# Patient Record
Sex: FEMALE | Race: WHITE | NOT HISPANIC OR LATINO
[De-identification: names, ages, dates, MRNs, and addresses within clinical notes are randomized per-mention and may not be internally consistent; named-entity substitution may affect disease eponyms.]

---

## 2017-02-16 ENCOUNTER — APPOINTMENT (OUTPATIENT)
Dept: PAIN MANAGEMENT | Facility: CLINIC | Age: 82
End: 2017-02-16

## 2017-02-16 VITALS
DIASTOLIC BLOOD PRESSURE: 94 MMHG | WEIGHT: 121 LBS | HEIGHT: 64 IN | BODY MASS INDEX: 20.66 KG/M2 | SYSTOLIC BLOOD PRESSURE: 159 MMHG | HEART RATE: 71 BPM

## 2017-03-31 ENCOUNTER — APPOINTMENT (OUTPATIENT)
Dept: PAIN MANAGEMENT | Facility: CLINIC | Age: 82
End: 2017-03-31

## 2017-03-31 VITALS
SYSTOLIC BLOOD PRESSURE: 142 MMHG | BODY MASS INDEX: 20.66 KG/M2 | DIASTOLIC BLOOD PRESSURE: 60 MMHG | HEIGHT: 64 IN | HEART RATE: 76 BPM | WEIGHT: 121 LBS

## 2017-03-31 DIAGNOSIS — G43.709 CHRONIC MIGRAINE W/OUT AURA, NOT INTRACTABLE, W/OUT STATUS MIGRAINOSUS: ICD-10-CM

## 2017-06-13 ENCOUNTER — OUTPATIENT (OUTPATIENT)
Dept: OUTPATIENT SERVICES | Facility: HOSPITAL | Age: 82
LOS: 1 days | End: 2017-06-13
Payer: MEDICARE

## 2017-06-13 VITALS
RESPIRATION RATE: 17 BRPM | HEIGHT: 64 IN | OXYGEN SATURATION: 100 % | HEART RATE: 68 BPM | DIASTOLIC BLOOD PRESSURE: 79 MMHG | WEIGHT: 119.05 LBS | SYSTOLIC BLOOD PRESSURE: 136 MMHG | TEMPERATURE: 98 F

## 2017-06-13 DIAGNOSIS — Z98.89 OTHER SPECIFIED POSTPROCEDURAL STATES: Chronic | ICD-10-CM

## 2017-06-13 DIAGNOSIS — Z01.810 ENCOUNTER FOR PREPROCEDURAL CARDIOVASCULAR EXAMINATION: ICD-10-CM

## 2017-06-13 LAB
ALBUMIN SERPL ELPH-MCNC: 4.3 G/DL — SIGNIFICANT CHANGE UP (ref 3.3–5)
ALP SERPL-CCNC: 64 U/L — SIGNIFICANT CHANGE UP (ref 40–120)
ALT FLD-CCNC: 17 U/L RC — SIGNIFICANT CHANGE UP (ref 10–45)
ANION GAP SERPL CALC-SCNC: 16 MMOL/L — SIGNIFICANT CHANGE UP (ref 5–17)
AST SERPL-CCNC: 18 U/L — SIGNIFICANT CHANGE UP (ref 10–40)
BILIRUB SERPL-MCNC: 0.6 MG/DL — SIGNIFICANT CHANGE UP (ref 0.2–1.2)
BUN SERPL-MCNC: 43 MG/DL — HIGH (ref 7–23)
CALCIUM SERPL-MCNC: 9.5 MG/DL — SIGNIFICANT CHANGE UP (ref 8.4–10.5)
CHLORIDE SERPL-SCNC: 106 MMOL/L — SIGNIFICANT CHANGE UP (ref 96–108)
CO2 SERPL-SCNC: 23 MMOL/L — SIGNIFICANT CHANGE UP (ref 22–31)
CREAT SERPL-MCNC: 1.79 MG/DL — HIGH (ref 0.5–1.3)
GLUCOSE SERPL-MCNC: 80 MG/DL — SIGNIFICANT CHANGE UP (ref 70–99)
HCT VFR BLD CALC: 37.7 % — SIGNIFICANT CHANGE UP (ref 34.5–45)
HGB BLD-MCNC: 12.6 G/DL — SIGNIFICANT CHANGE UP (ref 11.5–15.5)
MCHC RBC-ENTMCNC: 32.7 PG — SIGNIFICANT CHANGE UP (ref 27–34)
MCHC RBC-ENTMCNC: 33.4 GM/DL — SIGNIFICANT CHANGE UP (ref 32–36)
MCV RBC AUTO: 97.9 FL — SIGNIFICANT CHANGE UP (ref 80–100)
PLATELET # BLD AUTO: 153 K/UL — SIGNIFICANT CHANGE UP (ref 150–400)
POTASSIUM SERPL-MCNC: 4.5 MMOL/L — SIGNIFICANT CHANGE UP (ref 3.5–5.3)
POTASSIUM SERPL-SCNC: 4.5 MMOL/L — SIGNIFICANT CHANGE UP (ref 3.5–5.3)
PROT SERPL-MCNC: 7.7 G/DL — SIGNIFICANT CHANGE UP (ref 6–8.3)
RBC # BLD: 3.85 M/UL — SIGNIFICANT CHANGE UP (ref 3.8–5.2)
RBC # FLD: 12.6 % — SIGNIFICANT CHANGE UP (ref 10.3–14.5)
SODIUM SERPL-SCNC: 145 MMOL/L — SIGNIFICANT CHANGE UP (ref 135–145)
WBC # BLD: 5.7 K/UL — SIGNIFICANT CHANGE UP (ref 3.8–10.5)
WBC # FLD AUTO: 5.7 K/UL — SIGNIFICANT CHANGE UP (ref 3.8–10.5)

## 2017-06-13 PROCEDURE — C1769: CPT

## 2017-06-13 PROCEDURE — 93458 L HRT ARTERY/VENTRICLE ANGIO: CPT

## 2017-06-13 PROCEDURE — 93010 ELECTROCARDIOGRAM REPORT: CPT

## 2017-06-13 PROCEDURE — C1894: CPT

## 2017-06-13 PROCEDURE — 85027 COMPLETE CBC AUTOMATED: CPT

## 2017-06-13 PROCEDURE — 93458 L HRT ARTERY/VENTRICLE ANGIO: CPT | Mod: 26

## 2017-06-13 PROCEDURE — 93005 ELECTROCARDIOGRAM TRACING: CPT

## 2017-06-13 PROCEDURE — 93567 NJX CAR CTH SPRVLV AORTGRPHY: CPT

## 2017-06-13 PROCEDURE — C1887: CPT

## 2017-06-13 PROCEDURE — 80053 COMPREHEN METABOLIC PANEL: CPT

## 2017-06-13 RX ORDER — SODIUM BICARBONATE 1 MEQ/ML
0.44 SYRINGE (ML) INTRAVENOUS
Qty: 150 | Refills: 0 | Status: DISCONTINUED | OUTPATIENT
Start: 2017-06-13 | End: 2017-06-28

## 2017-06-13 RX ADMIN — Medication 160 MEQ/KG/HR: at 11:45

## 2017-06-13 NOTE — H&P CARDIOLOGY - PMH
Bronchiolectasis    Elevated serum creatinine  baseline  renal Md Hilepo  Migraine    Mitral valve prolapse    Palpitations

## 2017-06-13 NOTE — H&P CARDIOLOGY - HISTORY OF PRESENT ILLNESS
This is a 83 year old female with PMH of broncholithiasis, mitral valve prolapse, migraine and palpitations ( for which she tales Metroprolol),  baseline elevated Creatinine ( latest creat 1.67 managed by Md Everett, received Mucomyst 600mg x2 before cath, and to be hydrated with D5W with 150 meq of HCO3- at 160c/hr for 1 hour prior to cath then at 54cc/hr for 6 hours post cath). who presents with hemoptysis  for 1 day. Reports of chronic cough.  Yesterday morning, pt noticed bright red blood in sputum after coughing.  Following that she had couple of episodes of hemoptysis.   Denies fever, chills, dizziness, headache, chest pain, palpitations, shortness of breath, abdominal pain, nausea, vomiting, diarrhea and LE edema.      In ED VS: /75  RR 20 Temp 97.8 O2 sat 99%  In ED pt received Ceftriaxone 1 gram IV and Azithromycin 500 mg IV  CXR: No acute disease  CT chest with contrast: New 2cm ground glass opacity in superior segment of right lower lobe which could be infectious, inflammatory or neoplastic.  Stable pulmonary nodules and multiple areas of mucoid impaction.  Bronchiolectasis  New 9mm opacity in right lower lobe likely represent a new foci of mucoid impaction.    Bun 48 Cr. 1.93 This is a 83 year old female with PMH of broncholithiasis, mitral valve prolapse, migraine and palpitations ( for which she tales Metroprolol),  baseline elevated Creatinine ( latest creat 1.67 managed by Md Everett, received Mucomyst 600mg x2 before cath, and to be hydrated with D5W with 150 meq of HCO3-/Iliter  at 160cc/hr for 1 hour prior to cath then at 54cc/hr for 6 hours post cath). who presents with hemoptysis  for 1 day. Reports of chronic cough.  Yesterday morning, pt noticed bright red blood in sputum after coughing.  Following that she had couple of episodes of hemoptysis.   Denies fever, chills, dizziness, headache, chest pain, palpitations, shortness of breath, abdominal pain, nausea, vomiting, diarrhea and LE edema.      In ED VS: /75  RR 20 Temp 97.8 O2 sat 99%  In ED pt received Ceftriaxone 1 gram IV and Azithromycin 500 mg IV  CXR: No acute disease  CT chest with contrast: New 2cm ground glass opacity in superior segment of right lower lobe which could be infectious, inflammatory or neoplastic.  Stable pulmonary nodules and multiple areas of mucoid impaction.  Bronchiolectasis  New 9mm opacity in right lower lobe likely represent a new foci of mucoid impaction.    Bun 48 Cr. 1.93 This is a 83 year old female with PMH of broncholithiasis, mitral valve prolapse, migraine and palpitations ( for which she takes Metroprolol),  baseline elevated Creatinine ( latest creat 1.67 managed by Md Everett, received Mucomyst 600mg x2 before cath, and to be hydrated with D5W with 150 meq of HCO3-/Iliter  at 160cc/hr for 1 hour prior to cath then at 54cc/hr for 6 hours post cath- as per Md YANG Perez).   Presents to Md Perez with cc of bl feet edema x2 months ago, fatigue and "winded" on exertion, after 2 blocks.  Patient states she used to be a runner for the past 30 years, and this is very unusual for her to be experiencing.  ECHO showed aortic insufficiency, as per patient ( no ECHO report available). Referred here today for Right and Left HC.  Currently denies  fever, chills, hemoptysis, dizziness, headache, chest pain, palpitations, shortness of breath, abdominal pain, nausea, vomiting, diarrhea and LE edema.

## 2017-06-13 NOTE — CHART NOTE - NSCHARTNOTEFT_GEN_A_CORE
Diagnostic Removal of Femoral Sheath (done by RN Shraddha Edmond)    At 16:10 called at bed side by RN, patient developed Right groin Hematoma after walking to the bathroom.  Patient is s/p diagnostic cath via RFA, came out of cath lab at 13:30 and sheaths were pulled around 13:40 , w/o any complications ( no bleeding and no hematoma) as per RN.  As per RN right groin has been stable for the past few hours up to now.    V/s stable /68 HR 58 RR 16/min O2 sat 97% on RA    The patient was placed in the supine position.  Assisted nurse with manual compression of hematoma, as per protocol.  Direct pressure was applied for  approx. 15 minutes with hematoma resolution.  Right groin now soft, mildly tender to touch ( as expected), and mild right groin ecchymosis present ( also as expected after hematoma compression).     Pulses in the (right) lower extremity are (palpable +2); Palpable +2 pulses of both right femoral and right pedal pulses .  Patient denies chest pain, leg or foot pain, leg or foot numbness/tingling   + capillary refill of right lower extremity     After hematoma compression and resolution, patient placed on bed rest for about 45 min, while closely monitoring site   Monitoring of the (right) groin and both lower extremities including neuro-vascular checks and vital signs every 15 minutes x 4, then every 30 minutes x 2, then every 1 hour was ordered.    Around 17:30 patient was able to ambulate down hallway, and able to go to bathroom and void w/o any complications  right groin remains stable ( no bleeding, no hematoma and no pain)   v/s cont to be stable: /60 HR 58 RR16/min O2 sat 96% RA    Patient okay to be DC home at this time  Patient verbalizes understanding of  activity do's and dont's, as well as all reportable signs and symptoms  teaching given to both patient and daughter ( at bed side)

## 2017-06-13 NOTE — H&P CARDIOLOGY - FAMILY HISTORY
Father  Still living? Unknown  FH: CAD (coronary artery disease), Age at diagnosis: Age Unknown  FH: MI (myocardial infarction), Age at diagnosis: Age Unknown

## 2017-06-15 PROBLEM — Z82.49 FAMILY HISTORY OF CAD (CORONARY ARTERY DISEASE): Status: ACTIVE | Noted: 2017-06-15

## 2017-06-15 PROBLEM — Z60.2 LIVES ALONE WITHOUT HELP AVAILABLE: Status: ACTIVE | Noted: 2017-06-15

## 2017-06-15 PROBLEM — R00.2 PALPITATIONS: Status: RESOLVED | Noted: 2017-06-15 | Resolved: 2017-06-15

## 2017-06-15 PROBLEM — Z63.4 WIDOWED: Status: ACTIVE | Noted: 2017-06-15

## 2017-06-15 PROBLEM — I34.1 MITRAL VALVE PROLAPSE SYNDROME: Status: ACTIVE | Noted: 2017-06-15

## 2017-06-15 PROBLEM — I35.1 AORTIC REGURGITATION: Status: ACTIVE | Noted: 2017-06-15

## 2017-06-15 RX ORDER — FUROSEMIDE 20 MG/1
20 TABLET ORAL
Qty: 10 | Refills: 0 | Status: DISCONTINUED | COMMUNITY
Start: 2016-12-19

## 2017-06-15 RX ORDER — TOBRAMYCIN AND DEXAMETHASONE 3; 1 MG/ML; MG/ML
0.3-0.1 SUSPENSION/ DROPS OPHTHALMIC
Qty: 5 | Refills: 0 | Status: DISCONTINUED | COMMUNITY
Start: 2016-11-29

## 2017-06-19 ENCOUNTER — APPOINTMENT (OUTPATIENT)
Dept: CARDIOTHORACIC SURGERY | Facility: CLINIC | Age: 82
End: 2017-06-19

## 2017-06-19 DIAGNOSIS — Z82.49 FAMILY HISTORY OF ISCHEMIC HEART DISEASE AND OTHER DISEASES OF THE CIRCULATORY SYSTEM: ICD-10-CM

## 2017-06-19 DIAGNOSIS — I34.1 NONRHEUMATIC MITRAL (VALVE) PROLAPSE: ICD-10-CM

## 2017-06-19 DIAGNOSIS — Z60.2 PROBLEMS RELATED TO LIVING ALONE: ICD-10-CM

## 2017-06-19 DIAGNOSIS — Z63.4 DISAPPEARANCE AND DEATH OF FAMILY MEMBER: ICD-10-CM

## 2017-06-19 DIAGNOSIS — R00.2 PALPITATIONS: ICD-10-CM

## 2017-06-19 DIAGNOSIS — I35.1 NONRHEUMATIC AORTIC (VALVE) INSUFFICIENCY: ICD-10-CM

## 2017-06-19 SDOH — SOCIAL STABILITY - SOCIAL INSECURITY: DISSAPEARANCE AND DEATH OF FAMILY MEMBER: Z63.4

## 2017-06-19 SDOH — SOCIAL STABILITY - SOCIAL INSECURITY: PROBLEMS RELATED TO LIVING ALONE: Z60.2

## 2017-06-20 ENCOUNTER — APPOINTMENT (OUTPATIENT)
Dept: PAIN MANAGEMENT | Facility: CLINIC | Age: 82
End: 2017-06-20

## 2017-06-26 ENCOUNTER — APPOINTMENT (OUTPATIENT)
Dept: CARDIOTHORACIC SURGERY | Facility: CLINIC | Age: 82
End: 2017-06-26

## 2017-06-26 ENCOUNTER — APPOINTMENT (OUTPATIENT)
Dept: CARDIOLOGY | Facility: CLINIC | Age: 82
End: 2017-06-26

## 2017-06-26 ENCOUNTER — OUTPATIENT (OUTPATIENT)
Dept: OUTPATIENT SERVICES | Facility: HOSPITAL | Age: 82
LOS: 1 days | End: 2017-06-26
Payer: MEDICARE

## 2017-06-26 VITALS — SYSTOLIC BLOOD PRESSURE: 129 MMHG | DIASTOLIC BLOOD PRESSURE: 80 MMHG

## 2017-06-26 VITALS
HEIGHT: 64 IN | SYSTOLIC BLOOD PRESSURE: 146 MMHG | WEIGHT: 118 LBS | BODY MASS INDEX: 20.14 KG/M2 | DIASTOLIC BLOOD PRESSURE: 73 MMHG | TEMPERATURE: 97.3 F | RESPIRATION RATE: 16 BRPM | OXYGEN SATURATION: 97 % | HEART RATE: 70 BPM

## 2017-06-26 VITALS
SYSTOLIC BLOOD PRESSURE: 149 MMHG | HEART RATE: 59 BPM | HEIGHT: 64 IN | BODY MASS INDEX: 20.14 KG/M2 | DIASTOLIC BLOOD PRESSURE: 74 MMHG | OXYGEN SATURATION: 100 % | WEIGHT: 118 LBS

## 2017-06-26 DIAGNOSIS — T14.8 OTHER INJURY OF UNSPECIFIED BODY REGION: ICD-10-CM

## 2017-06-26 DIAGNOSIS — Z98.89 OTHER SPECIFIED POSTPROCEDURAL STATES: Chronic | ICD-10-CM

## 2017-06-26 PROCEDURE — 93926 LOWER EXTREMITY STUDY: CPT

## 2017-06-26 PROCEDURE — 93926 LOWER EXTREMITY STUDY: CPT | Mod: 26,RT

## 2017-07-18 ENCOUNTER — APPOINTMENT (OUTPATIENT)
Dept: PAIN MANAGEMENT | Facility: CLINIC | Age: 82
End: 2017-07-18

## 2017-07-18 VITALS
BODY MASS INDEX: 20.14 KG/M2 | WEIGHT: 118 LBS | HEART RATE: 68 BPM | DIASTOLIC BLOOD PRESSURE: 75 MMHG | HEIGHT: 64 IN | SYSTOLIC BLOOD PRESSURE: 154 MMHG

## 2017-07-18 DIAGNOSIS — N94.9 UNSPECIFIED CONDITION ASSOCIATED WITH FEMALE GENITAL ORGANS AND MENSTRUAL CYCLE: ICD-10-CM

## 2017-08-08 ENCOUNTER — APPOINTMENT (OUTPATIENT)
Dept: CARDIOTHORACIC SURGERY | Facility: CLINIC | Age: 82
End: 2017-08-08
Payer: MEDICARE

## 2017-08-08 ENCOUNTER — APPOINTMENT (OUTPATIENT)
Dept: ULTRASOUND IMAGING | Facility: HOSPITAL | Age: 82
End: 2017-08-08

## 2017-08-08 ENCOUNTER — OUTPATIENT (OUTPATIENT)
Dept: OUTPATIENT SERVICES | Facility: HOSPITAL | Age: 82
LOS: 1 days | End: 2017-08-08
Payer: MEDICARE

## 2017-08-08 VITALS
HEIGHT: 63 IN | OXYGEN SATURATION: 99 % | DIASTOLIC BLOOD PRESSURE: 74 MMHG | TEMPERATURE: 97 F | SYSTOLIC BLOOD PRESSURE: 144 MMHG | WEIGHT: 119.49 LBS | HEART RATE: 72 BPM | RESPIRATION RATE: 18 BRPM

## 2017-08-08 DIAGNOSIS — Z01.818 ENCOUNTER FOR OTHER PREPROCEDURAL EXAMINATION: ICD-10-CM

## 2017-08-08 DIAGNOSIS — I35.0 NONRHEUMATIC AORTIC (VALVE) STENOSIS: ICD-10-CM

## 2017-08-08 DIAGNOSIS — R79.89 OTHER SPECIFIED ABNORMAL FINDINGS OF BLOOD CHEMISTRY: ICD-10-CM

## 2017-08-08 DIAGNOSIS — Z98.49 CATARACT EXTRACTION STATUS, UNSPECIFIED EYE: Chronic | ICD-10-CM

## 2017-08-08 DIAGNOSIS — Z98.89 OTHER SPECIFIED POSTPROCEDURAL STATES: Chronic | ICD-10-CM

## 2017-08-08 DIAGNOSIS — Z98.890 OTHER SPECIFIED POSTPROCEDURAL STATES: Chronic | ICD-10-CM

## 2017-08-08 LAB
ANION GAP SERPL CALC-SCNC: 15 MMOL/L — SIGNIFICANT CHANGE UP (ref 5–17)
BLD GP AB SCN SERPL QL: NEGATIVE — SIGNIFICANT CHANGE UP
BUN SERPL-MCNC: 48 MG/DL — HIGH (ref 7–23)
CALCIUM SERPL-MCNC: 9.8 MG/DL — SIGNIFICANT CHANGE UP (ref 8.4–10.5)
CHLORIDE SERPL-SCNC: 101 MMOL/L — SIGNIFICANT CHANGE UP (ref 96–108)
CO2 SERPL-SCNC: 23 MMOL/L — SIGNIFICANT CHANGE UP (ref 22–31)
CREAT SERPL-MCNC: 1.82 MG/DL — HIGH (ref 0.5–1.3)
GLUCOSE SERPL-MCNC: 145 MG/DL — HIGH (ref 70–99)
HBA1C BLD-MCNC: 5.6 % — SIGNIFICANT CHANGE UP (ref 4–5.6)
HCT VFR BLD CALC: 37.9 % — SIGNIFICANT CHANGE UP (ref 34.5–45)
HGB BLD-MCNC: 12.3 G/DL — SIGNIFICANT CHANGE UP (ref 11.5–15.5)
MCHC RBC-ENTMCNC: 31 PG — SIGNIFICANT CHANGE UP (ref 27–34)
MCHC RBC-ENTMCNC: 32.5 GM/DL — SIGNIFICANT CHANGE UP (ref 32–36)
MCV RBC AUTO: 95.5 FL — SIGNIFICANT CHANGE UP (ref 80–100)
PLATELET # BLD AUTO: 167 K/UL — SIGNIFICANT CHANGE UP (ref 150–400)
POTASSIUM SERPL-MCNC: 4.2 MMOL/L — SIGNIFICANT CHANGE UP (ref 3.5–5.3)
POTASSIUM SERPL-SCNC: 4.2 MMOL/L — SIGNIFICANT CHANGE UP (ref 3.5–5.3)
RBC # BLD: 3.97 M/UL — SIGNIFICANT CHANGE UP (ref 3.8–5.2)
RBC # FLD: 14 % — SIGNIFICANT CHANGE UP (ref 10.3–14.5)
RH IG SCN BLD-IMP: POSITIVE — SIGNIFICANT CHANGE UP
SODIUM SERPL-SCNC: 139 MMOL/L — SIGNIFICANT CHANGE UP (ref 135–145)
WBC # BLD: 5.35 K/UL — SIGNIFICANT CHANGE UP (ref 3.8–10.5)
WBC # FLD AUTO: 5.35 K/UL — SIGNIFICANT CHANGE UP (ref 3.8–10.5)

## 2017-08-08 PROCEDURE — 71046 X-RAY EXAM CHEST 2 VIEWS: CPT

## 2017-08-08 PROCEDURE — 83036 HEMOGLOBIN GLYCOSYLATED A1C: CPT

## 2017-08-08 PROCEDURE — 93880 EXTRACRANIAL BILAT STUDY: CPT | Mod: 26

## 2017-08-08 PROCEDURE — 85027 COMPLETE CBC AUTOMATED: CPT

## 2017-08-08 PROCEDURE — 93880 EXTRACRANIAL BILAT STUDY: CPT

## 2017-08-08 PROCEDURE — 71020: CPT | Mod: 26

## 2017-08-08 PROCEDURE — 86850 RBC ANTIBODY SCREEN: CPT

## 2017-08-08 PROCEDURE — 94010 BREATHING CAPACITY TEST: CPT

## 2017-08-08 PROCEDURE — 80048 BASIC METABOLIC PNL TOTAL CA: CPT

## 2017-08-08 PROCEDURE — G0463: CPT

## 2017-08-08 PROCEDURE — 87641 MR-STAPH DNA AMP PROBE: CPT

## 2017-08-08 PROCEDURE — 86900 BLOOD TYPING SEROLOGIC ABO: CPT

## 2017-08-08 PROCEDURE — 86901 BLOOD TYPING SEROLOGIC RH(D): CPT

## 2017-08-08 PROCEDURE — 87640 STAPH A DNA AMP PROBE: CPT

## 2017-08-08 RX ORDER — CEFUROXIME AXETIL 250 MG
1500 TABLET ORAL ONCE
Qty: 0 | Refills: 0 | Status: COMPLETED | OUTPATIENT
Start: 2017-08-15 | End: 2017-08-15

## 2017-08-08 NOTE — H&P PST ADULT - HISTORY OF PRESENT ILLNESS
83 year old female with h/o elevated creatinine x years, occasional palpitation, MVP, migraine headaches, and nonrheumatic aortic valve stenosis, scheduled for aortic valve repair.

## 2017-08-08 NOTE — H&P PST ADULT - NSANTHOSAYNRD_GEN_A_CORE
No. HERNESTO screening performed.  STOP BANG Legend: 0-2 = LOW Risk; 3-4 = INTERMEDIATE Risk; 5-8 = HIGH Risk

## 2017-08-08 NOTE — H&P PST ADULT - PMH
Bronchiolectasis    Elevated serum creatinine  baseline  renal Md Hilepo  Migraine    Mitral valve prolapse    Palpitations Bronchiolectasis    Elevated serum creatinine  baseline  renal Md Hilepo  Migraine    Mitral valve prolapse    Nonrheumatic aortic (valve) stenosis    Palpitations    Scoliosis unspecified, unspecified site

## 2017-08-09 LAB
MRSA PCR RESULT.: SIGNIFICANT CHANGE UP
S AUREUS DNA NOSE QL NAA+PROBE: SIGNIFICANT CHANGE UP

## 2017-08-15 ENCOUNTER — TRANSCRIPTION ENCOUNTER (OUTPATIENT)
Age: 82
End: 2017-08-15

## 2017-08-15 ENCOUNTER — APPOINTMENT (OUTPATIENT)
Dept: CARDIOTHORACIC SURGERY | Facility: HOSPITAL | Age: 82
End: 2017-08-15

## 2017-08-15 ENCOUNTER — INPATIENT (INPATIENT)
Facility: HOSPITAL | Age: 82
LOS: 9 days | Discharge: HOME | DRG: 219 | End: 2017-08-25
Attending: THORACIC SURGERY (CARDIOTHORACIC VASCULAR SURGERY) | Admitting: THORACIC SURGERY (CARDIOTHORACIC VASCULAR SURGERY)
Payer: MEDICARE

## 2017-08-15 ENCOUNTER — RESULT REVIEW (OUTPATIENT)
Age: 82
End: 2017-08-15

## 2017-08-15 VITALS
HEIGHT: 63 IN | RESPIRATION RATE: 18 BRPM | OXYGEN SATURATION: 97 % | HEART RATE: 100 BPM | TEMPERATURE: 98 F | WEIGHT: 120.37 LBS | SYSTOLIC BLOOD PRESSURE: 158 MMHG | DIASTOLIC BLOOD PRESSURE: 83 MMHG

## 2017-08-15 DIAGNOSIS — Z98.890 OTHER SPECIFIED POSTPROCEDURAL STATES: Chronic | ICD-10-CM

## 2017-08-15 DIAGNOSIS — I35.0 NONRHEUMATIC AORTIC (VALVE) STENOSIS: ICD-10-CM

## 2017-08-15 DIAGNOSIS — Z98.89 OTHER SPECIFIED POSTPROCEDURAL STATES: Chronic | ICD-10-CM

## 2017-08-15 DIAGNOSIS — Z98.49 CATARACT EXTRACTION STATUS, UNSPECIFIED EYE: Chronic | ICD-10-CM

## 2017-08-15 LAB
ALBUMIN SERPL ELPH-MCNC: 3.6 G/DL — SIGNIFICANT CHANGE UP (ref 3.3–5)
ALP SERPL-CCNC: 45 U/L — SIGNIFICANT CHANGE UP (ref 40–120)
ALT FLD-CCNC: 15 U/L RC — SIGNIFICANT CHANGE UP (ref 10–45)
ANION GAP SERPL CALC-SCNC: 15 MMOL/L — SIGNIFICANT CHANGE UP (ref 5–17)
APTT BLD: 34 SEC — SIGNIFICANT CHANGE UP (ref 27.5–37.4)
AST SERPL-CCNC: 38 U/L — SIGNIFICANT CHANGE UP (ref 10–40)
BASE EXCESS BLDV CALC-SCNC: -5 MMOL/L — LOW (ref -2–2)
BILIRUB SERPL-MCNC: 1.2 MG/DL — SIGNIFICANT CHANGE UP (ref 0.2–1.2)
BLOOD GAS VENOUS - CREATININE: SIGNIFICANT CHANGE UP MG/DL (ref 0.5–1.3)
BUN SERPL-MCNC: 35 MG/DL — HIGH (ref 7–23)
CA-I SERPL-SCNC: 1.03 MMOL/L — LOW (ref 1.12–1.3)
CALCIUM SERPL-MCNC: 8.4 MG/DL — SIGNIFICANT CHANGE UP (ref 8.4–10.5)
CHLORIDE BLDV-SCNC: SIGNIFICANT CHANGE UP MMOL/L (ref 96–108)
CHLORIDE SERPL-SCNC: 105 MMOL/L — SIGNIFICANT CHANGE UP (ref 96–108)
CK MB BLD-MCNC: 15.6 % — HIGH (ref 0–3.5)
CK MB CFR SERPL CALC: 52.3 NG/ML — HIGH (ref 0–3.8)
CK SERPL-CCNC: 335 U/L — HIGH (ref 25–170)
CO2 SERPL-SCNC: 24 MMOL/L — SIGNIFICANT CHANGE UP (ref 22–31)
CREAT SERPL-MCNC: 1.23 MG/DL — SIGNIFICANT CHANGE UP (ref 0.5–1.3)
EOSINOPHIL # BLD AUTO: 0.1 K/UL — SIGNIFICANT CHANGE UP (ref 0–0.5)
EOSINOPHIL NFR BLD AUTO: 3 % — SIGNIFICANT CHANGE UP (ref 0–6)
FIBRINOGEN PPP-MCNC: 239 MG/DL — LOW (ref 310–510)
GAS PNL BLDA: SIGNIFICANT CHANGE UP
GAS PNL BLDV: 139 MMOL/L — SIGNIFICANT CHANGE UP (ref 136–145)
GAS PNL BLDV: SIGNIFICANT CHANGE UP
GAS PNL BLDV: SIGNIFICANT CHANGE UP
GLUCOSE BLDV-MCNC: 136 MG/DL — HIGH (ref 70–99)
GLUCOSE SERPL-MCNC: 210 MG/DL — HIGH (ref 70–99)
HCO3 BLDV-SCNC: 22 MMOL/L — SIGNIFICANT CHANGE UP (ref 21–29)
HCT VFR BLD CALC: 37.7 % — SIGNIFICANT CHANGE UP (ref 34.5–45)
HCT VFR BLDA CALC: 27 % — LOW (ref 39–50)
HGB BLD CALC-MCNC: 8.7 G/DL — LOW (ref 11.5–15.5)
HGB BLD-MCNC: 12.9 G/DL — SIGNIFICANT CHANGE UP (ref 11.5–15.5)
HOROWITZ INDEX BLDV+IHG-RTO: 0 — SIGNIFICANT CHANGE UP
INR BLD: 1.21 RATIO — HIGH (ref 0.88–1.16)
LACTATE BLDV-MCNC: 0.7 MMOL/L — SIGNIFICANT CHANGE UP (ref 0.7–2)
LYMPHOCYTES # BLD AUTO: 1.3 K/UL — SIGNIFICANT CHANGE UP (ref 1–3.3)
LYMPHOCYTES # BLD AUTO: 9 % — LOW (ref 13–44)
MCHC RBC-ENTMCNC: 33.1 PG — SIGNIFICANT CHANGE UP (ref 27–34)
MCHC RBC-ENTMCNC: 34.1 GM/DL — SIGNIFICANT CHANGE UP (ref 32–36)
MCV RBC AUTO: 97 FL — SIGNIFICANT CHANGE UP (ref 80–100)
MONOCYTES # BLD AUTO: 0.5 K/UL — SIGNIFICANT CHANGE UP (ref 0–0.9)
MONOCYTES NFR BLD AUTO: 5 % — SIGNIFICANT CHANGE UP (ref 2–14)
NEUTROPHILS # BLD AUTO: 11.5 K/UL — HIGH (ref 1.8–7.4)
NEUTROPHILS NFR BLD AUTO: 80 % — HIGH (ref 43–77)
PCO2 BLDV: 51 MMHG — HIGH (ref 35–50)
PH BLDV: 7.25 — LOW (ref 7.35–7.45)
PLATELET # BLD AUTO: 66 K/UL — LOW (ref 150–400)
PO2 BLDV: 60 MMHG — HIGH (ref 25–45)
POTASSIUM BLDV-SCNC: 3.6 MMOL/L — SIGNIFICANT CHANGE UP (ref 3.5–5)
POTASSIUM SERPL-MCNC: 4.7 MMOL/L — SIGNIFICANT CHANGE UP (ref 3.5–5.3)
POTASSIUM SERPL-SCNC: 4.7 MMOL/L — SIGNIFICANT CHANGE UP (ref 3.5–5.3)
PROT SERPL-MCNC: 5.2 G/DL — LOW (ref 6–8.3)
PROTHROM AB SERPL-ACNC: 13.2 SEC — HIGH (ref 9.8–12.7)
RBC # BLD: 3.89 M/UL — SIGNIFICANT CHANGE UP (ref 3.8–5.2)
RBC # FLD: 12.9 % — SIGNIFICANT CHANGE UP (ref 10.3–14.5)
RH IG SCN BLD-IMP: POSITIVE — SIGNIFICANT CHANGE UP
SAO2 % BLDV: 82 % — SIGNIFICANT CHANGE UP (ref 67–88)
SODIUM SERPL-SCNC: 144 MMOL/L — SIGNIFICANT CHANGE UP (ref 135–145)
TROPONIN T SERPL-MCNC: 1.49 NG/ML — HIGH (ref 0–0.06)
WBC # BLD: 13.5 K/UL — HIGH (ref 3.8–10.5)
WBC # FLD AUTO: 13.5 K/UL — HIGH (ref 3.8–10.5)

## 2017-08-15 PROCEDURE — 88305 TISSUE EXAM BY PATHOLOGIST: CPT | Mod: 26

## 2017-08-15 PROCEDURE — 93010 ELECTROCARDIOGRAM REPORT: CPT

## 2017-08-15 PROCEDURE — 71010: CPT | Mod: 26

## 2017-08-15 PROCEDURE — 33405 REPLACEMENT AORTIC VALVE OPN: CPT

## 2017-08-15 RX ORDER — ALBUMIN HUMAN 25 %
250 VIAL (ML) INTRAVENOUS ONCE
Qty: 0 | Refills: 0 | Status: COMPLETED | OUTPATIENT
Start: 2017-08-15 | End: 2017-08-15

## 2017-08-15 RX ORDER — DEXMEDETOMIDINE HYDROCHLORIDE IN 0.9% SODIUM CHLORIDE 4 UG/ML
0.4 INJECTION INTRAVENOUS
Qty: 200 | Refills: 0 | Status: DISCONTINUED | OUTPATIENT
Start: 2017-08-15 | End: 2017-08-15

## 2017-08-15 RX ORDER — FENTANYL CITRATE 50 UG/ML
25 INJECTION INTRAVENOUS ONCE
Qty: 0 | Refills: 0 | Status: DISCONTINUED | OUTPATIENT
Start: 2017-08-15 | End: 2017-08-15

## 2017-08-15 RX ORDER — NICARDIPINE HYDROCHLORIDE 30 MG/1
3 CAPSULE, EXTENDED RELEASE ORAL
Qty: 40 | Refills: 0 | Status: DISCONTINUED | OUTPATIENT
Start: 2017-08-15 | End: 2017-08-15

## 2017-08-15 RX ORDER — CEFUROXIME AXETIL 250 MG
1500 TABLET ORAL EVERY 12 HOURS
Qty: 0 | Refills: 0 | Status: COMPLETED | OUTPATIENT
Start: 2017-08-15 | End: 2017-08-16

## 2017-08-15 RX ORDER — INSULIN HUMAN 100 [IU]/ML
1 INJECTION, SOLUTION SUBCUTANEOUS
Qty: 100 | Refills: 0 | Status: DISCONTINUED | OUTPATIENT
Start: 2017-08-15 | End: 2017-08-16

## 2017-08-15 RX ORDER — ASPIRIN/CALCIUM CARB/MAGNESIUM 324 MG
81 TABLET ORAL DAILY
Qty: 0 | Refills: 0 | Status: DISCONTINUED | OUTPATIENT
Start: 2017-08-15 | End: 2017-08-25

## 2017-08-15 RX ORDER — NOREPINEPHRINE BITARTRATE/D5W 8 MG/250ML
0.04 PLASTIC BAG, INJECTION (ML) INTRAVENOUS
Qty: 8 | Refills: 0 | Status: DISCONTINUED | OUTPATIENT
Start: 2017-08-15 | End: 2017-08-15

## 2017-08-15 RX ORDER — FAMOTIDINE 10 MG/ML
20 INJECTION INTRAVENOUS DAILY
Qty: 0 | Refills: 0 | Status: DISCONTINUED | OUTPATIENT
Start: 2017-08-15 | End: 2017-08-18

## 2017-08-15 RX ORDER — ONDANSETRON 8 MG/1
4 TABLET, FILM COATED ORAL ONCE
Qty: 0 | Refills: 0 | Status: COMPLETED | OUTPATIENT
Start: 2017-08-15 | End: 2017-08-15

## 2017-08-15 RX ORDER — MEPERIDINE HYDROCHLORIDE 50 MG/ML
25 INJECTION INTRAMUSCULAR; INTRAVENOUS; SUBCUTANEOUS ONCE
Qty: 0 | Refills: 0 | Status: DISCONTINUED | OUTPATIENT
Start: 2017-08-15 | End: 2017-08-15

## 2017-08-15 RX ORDER — SODIUM CHLORIDE 9 MG/ML
3 INJECTION INTRAMUSCULAR; INTRAVENOUS; SUBCUTANEOUS EVERY 8 HOURS
Qty: 0 | Refills: 0 | Status: DISCONTINUED | OUTPATIENT
Start: 2017-08-15 | End: 2017-08-15

## 2017-08-15 RX ORDER — SODIUM CHLORIDE 9 MG/ML
1000 INJECTION INTRAMUSCULAR; INTRAVENOUS; SUBCUTANEOUS
Qty: 0 | Refills: 0 | Status: DISCONTINUED | OUTPATIENT
Start: 2017-08-15 | End: 2017-08-17

## 2017-08-15 RX ORDER — DOCUSATE SODIUM 100 MG
100 CAPSULE ORAL THREE TIMES A DAY
Qty: 0 | Refills: 0 | Status: DISCONTINUED | OUTPATIENT
Start: 2017-08-15 | End: 2017-08-22

## 2017-08-15 RX ADMIN — Medication 500 MILLILITER(S): at 16:01

## 2017-08-15 RX ADMIN — Medication 250 MILLILITER(S): at 14:05

## 2017-08-15 RX ADMIN — FENTANYL CITRATE 25 MICROGRAM(S): 50 INJECTION INTRAVENOUS at 15:45

## 2017-08-15 RX ADMIN — FENTANYL CITRATE 25 MICROGRAM(S): 50 INJECTION INTRAVENOUS at 15:30

## 2017-08-15 RX ADMIN — FAMOTIDINE 20 MILLIGRAM(S): 10 INJECTION INTRAVENOUS at 17:39

## 2017-08-15 RX ADMIN — DEXMEDETOMIDINE HYDROCHLORIDE IN 0.9% SODIUM CHLORIDE 5.46 MICROGRAM(S)/KG/HR: 4 INJECTION INTRAVENOUS at 18:58

## 2017-08-15 RX ADMIN — Medication 500 MILLILITER(S): at 17:40

## 2017-08-15 RX ADMIN — INSULIN HUMAN 1 UNIT(S)/HR: 100 INJECTION, SOLUTION SUBCUTANEOUS at 15:00

## 2017-08-15 RX ADMIN — ONDANSETRON 4 MILLIGRAM(S): 8 TABLET, FILM COATED ORAL at 18:00

## 2017-08-15 RX ADMIN — SODIUM CHLORIDE 3 MILLILITER(S): 9 INJECTION INTRAMUSCULAR; INTRAVENOUS; SUBCUTANEOUS at 07:33

## 2017-08-15 RX ADMIN — Medication 500 MILLILITER(S): at 15:05

## 2017-08-15 RX ADMIN — Medication 100 MILLIGRAM(S): at 22:00

## 2017-08-15 NOTE — AIRWAY REMOVAL NOTE  ADULT & PEDS - ARTIFICAL AIRWAY REMOVAL COMMENTS
Written order for extubation verified. The patient was identified by full name and birth date compared to the identification band. Present during the procedure was DEBORAH De La Cruz.

## 2017-08-16 DIAGNOSIS — Z95.2 PRESENCE OF PROSTHETIC HEART VALVE: ICD-10-CM

## 2017-08-16 DIAGNOSIS — R20.8 OTHER DISTURBANCES OF SKIN SENSATION: ICD-10-CM

## 2017-08-16 DIAGNOSIS — D69.6 THROMBOCYTOPENIA, UNSPECIFIED: ICD-10-CM

## 2017-08-16 LAB
ALBUMIN SERPL ELPH-MCNC: 4.4 G/DL — SIGNIFICANT CHANGE UP (ref 3.3–5)
ALP SERPL-CCNC: 32 U/L — LOW (ref 40–120)
ALT FLD-CCNC: 24 U/L RC — SIGNIFICANT CHANGE UP (ref 10–45)
ANION GAP SERPL CALC-SCNC: 18 MMOL/L — HIGH (ref 5–17)
AST SERPL-CCNC: 54 U/L — HIGH (ref 10–40)
BASOPHILS # BLD AUTO: 0 K/UL — SIGNIFICANT CHANGE UP (ref 0–0.2)
BASOPHILS NFR BLD AUTO: 0 % — SIGNIFICANT CHANGE UP (ref 0–2)
BILIRUB SERPL-MCNC: 1.7 MG/DL — HIGH (ref 0.2–1.2)
BUN SERPL-MCNC: 32 MG/DL — HIGH (ref 7–23)
CALCIUM SERPL-MCNC: 8.9 MG/DL — SIGNIFICANT CHANGE UP (ref 8.4–10.5)
CHLORIDE SERPL-SCNC: 106 MMOL/L — SIGNIFICANT CHANGE UP (ref 96–108)
CO2 SERPL-SCNC: 20 MMOL/L — LOW (ref 22–31)
CREAT SERPL-MCNC: 1.38 MG/DL — HIGH (ref 0.5–1.3)
EOSINOPHIL # BLD AUTO: 0 K/UL — SIGNIFICANT CHANGE UP (ref 0–0.5)
EOSINOPHIL NFR BLD AUTO: 0.1 % — SIGNIFICANT CHANGE UP (ref 0–6)
GAS PNL BLDA: SIGNIFICANT CHANGE UP
GLUCOSE SERPL-MCNC: 102 MG/DL — HIGH (ref 70–99)
HCT VFR BLD CALC: 31.8 % — LOW (ref 34.5–45)
HGB BLD-MCNC: 10.7 G/DL — LOW (ref 11.5–15.5)
LYMPHOCYTES # BLD AUTO: 0.5 K/UL — LOW (ref 1–3.3)
LYMPHOCYTES # BLD AUTO: 5.3 % — LOW (ref 13–44)
MCHC RBC-ENTMCNC: 32.7 PG — SIGNIFICANT CHANGE UP (ref 27–34)
MCHC RBC-ENTMCNC: 33.6 GM/DL — SIGNIFICANT CHANGE UP (ref 32–36)
MCV RBC AUTO: 97.5 FL — SIGNIFICANT CHANGE UP (ref 80–100)
MONOCYTES # BLD AUTO: 0.4 K/UL — SIGNIFICANT CHANGE UP (ref 0–0.9)
MONOCYTES NFR BLD AUTO: 4.1 % — SIGNIFICANT CHANGE UP (ref 2–14)
NEUTROPHILS # BLD AUTO: 8.2 K/UL — HIGH (ref 1.8–7.4)
NEUTROPHILS NFR BLD AUTO: 90.5 % — HIGH (ref 43–77)
PLATELET # BLD AUTO: 66 K/UL — LOW (ref 150–400)
POTASSIUM SERPL-MCNC: 4.1 MMOL/L — SIGNIFICANT CHANGE UP (ref 3.5–5.3)
POTASSIUM SERPL-SCNC: 4.1 MMOL/L — SIGNIFICANT CHANGE UP (ref 3.5–5.3)
PROT SERPL-MCNC: 6.3 G/DL — SIGNIFICANT CHANGE UP (ref 6–8.3)
RBC # BLD: 3.26 M/UL — LOW (ref 3.8–5.2)
RBC # FLD: 13.1 % — SIGNIFICANT CHANGE UP (ref 10.3–14.5)
SODIUM SERPL-SCNC: 144 MMOL/L — SIGNIFICANT CHANGE UP (ref 135–145)
WBC # BLD: 9 K/UL — SIGNIFICANT CHANGE UP (ref 3.8–10.5)
WBC # FLD AUTO: 9 K/UL — SIGNIFICANT CHANGE UP (ref 3.8–10.5)

## 2017-08-16 PROCEDURE — 93010 ELECTROCARDIOGRAM REPORT: CPT

## 2017-08-16 PROCEDURE — 71010: CPT | Mod: 26,77

## 2017-08-16 PROCEDURE — 99291 CRITICAL CARE FIRST HOUR: CPT

## 2017-08-16 PROCEDURE — 99292 CRITICAL CARE ADDL 30 MIN: CPT

## 2017-08-16 PROCEDURE — 71010: CPT | Mod: 26

## 2017-08-16 RX ORDER — OXYCODONE AND ACETAMINOPHEN 5; 325 MG/1; MG/1
1 TABLET ORAL EVERY 6 HOURS
Qty: 0 | Refills: 0 | Status: DISCONTINUED | OUTPATIENT
Start: 2017-08-16 | End: 2017-08-16

## 2017-08-16 RX ORDER — METOPROLOL TARTRATE 50 MG
12.5 TABLET ORAL
Qty: 0 | Refills: 0 | Status: DISCONTINUED | OUTPATIENT
Start: 2017-08-16 | End: 2017-08-16

## 2017-08-16 RX ORDER — INSULIN LISPRO 100/ML
VIAL (ML) SUBCUTANEOUS AT BEDTIME
Qty: 0 | Refills: 0 | Status: DISCONTINUED | OUTPATIENT
Start: 2017-08-16 | End: 2017-08-19

## 2017-08-16 RX ORDER — DEXTROSE 50 % IN WATER 50 %
12.5 SYRINGE (ML) INTRAVENOUS ONCE
Qty: 0 | Refills: 0 | Status: DISCONTINUED | OUTPATIENT
Start: 2017-08-16 | End: 2017-08-17

## 2017-08-16 RX ORDER — METOPROLOL TARTRATE 50 MG
25 TABLET ORAL
Qty: 0 | Refills: 0 | Status: DISCONTINUED | OUTPATIENT
Start: 2017-08-16 | End: 2017-08-17

## 2017-08-16 RX ORDER — ONDANSETRON 8 MG/1
4 TABLET, FILM COATED ORAL ONCE
Qty: 0 | Refills: 0 | Status: COMPLETED | OUTPATIENT
Start: 2017-08-16 | End: 2017-08-16

## 2017-08-16 RX ORDER — INSULIN LISPRO 100/ML
VIAL (ML) SUBCUTANEOUS
Qty: 0 | Refills: 0 | Status: DISCONTINUED | OUTPATIENT
Start: 2017-08-16 | End: 2017-08-19

## 2017-08-16 RX ORDER — FENTANYL CITRATE 50 UG/ML
50 INJECTION INTRAVENOUS ONCE
Qty: 0 | Refills: 0 | Status: DISCONTINUED | OUTPATIENT
Start: 2017-08-16 | End: 2017-08-16

## 2017-08-16 RX ORDER — GLUCAGON INJECTION, SOLUTION 0.5 MG/.1ML
1 INJECTION, SOLUTION SUBCUTANEOUS ONCE
Qty: 0 | Refills: 0 | Status: DISCONTINUED | OUTPATIENT
Start: 2017-08-16 | End: 2017-08-17

## 2017-08-16 RX ORDER — ACETAMINOPHEN 500 MG
1000 TABLET ORAL ONCE
Qty: 0 | Refills: 0 | Status: COMPLETED | OUTPATIENT
Start: 2017-08-16 | End: 2017-08-16

## 2017-08-16 RX ORDER — ALPRAZOLAM 0.25 MG
0.25 TABLET ORAL DAILY
Qty: 0 | Refills: 0 | Status: DISCONTINUED | OUTPATIENT
Start: 2017-08-16 | End: 2017-08-20

## 2017-08-16 RX ORDER — BENZOCAINE AND MENTHOL 5; 1 G/100ML; G/100ML
1 LIQUID ORAL
Qty: 0 | Refills: 0 | Status: DISCONTINUED | OUTPATIENT
Start: 2017-08-16 | End: 2017-08-25

## 2017-08-16 RX ORDER — DEXTROSE 50 % IN WATER 50 %
1 SYRINGE (ML) INTRAVENOUS ONCE
Qty: 0 | Refills: 0 | Status: DISCONTINUED | OUTPATIENT
Start: 2017-08-16 | End: 2017-08-19

## 2017-08-16 RX ORDER — DEXTROSE 50 % IN WATER 50 %
25 SYRINGE (ML) INTRAVENOUS ONCE
Qty: 0 | Refills: 0 | Status: DISCONTINUED | OUTPATIENT
Start: 2017-08-16 | End: 2017-08-19

## 2017-08-16 RX ORDER — OXYCODONE AND ACETAMINOPHEN 5; 325 MG/1; MG/1
2 TABLET ORAL EVERY 6 HOURS
Qty: 0 | Refills: 0 | Status: DISCONTINUED | OUTPATIENT
Start: 2017-08-16 | End: 2017-08-16

## 2017-08-16 RX ORDER — SODIUM CHLORIDE 9 MG/ML
1000 INJECTION, SOLUTION INTRAVENOUS
Qty: 0 | Refills: 0 | Status: DISCONTINUED | OUTPATIENT
Start: 2017-08-16 | End: 2017-08-17

## 2017-08-16 RX ORDER — DEXTROSE 50 % IN WATER 50 %
25 SYRINGE (ML) INTRAVENOUS ONCE
Qty: 0 | Refills: 0 | Status: DISCONTINUED | OUTPATIENT
Start: 2017-08-16 | End: 2017-08-17

## 2017-08-16 RX ORDER — HYDROMORPHONE HYDROCHLORIDE 2 MG/ML
2 INJECTION INTRAMUSCULAR; INTRAVENOUS; SUBCUTANEOUS
Qty: 0 | Refills: 0 | Status: DISCONTINUED | OUTPATIENT
Start: 2017-08-16 | End: 2017-08-23

## 2017-08-16 RX ADMIN — FENTANYL CITRATE 50 MICROGRAM(S): 50 INJECTION INTRAVENOUS at 06:15

## 2017-08-16 RX ADMIN — FENTANYL CITRATE 50 MICROGRAM(S): 50 INJECTION INTRAVENOUS at 06:32

## 2017-08-16 RX ADMIN — Medication 1000 MILLIGRAM(S): at 04:15

## 2017-08-16 RX ADMIN — Medication 100 MILLIGRAM(S): at 08:30

## 2017-08-16 RX ADMIN — HYDROMORPHONE HYDROCHLORIDE 2 MILLIGRAM(S): 2 INJECTION INTRAMUSCULAR; INTRAVENOUS; SUBCUTANEOUS at 15:00

## 2017-08-16 RX ADMIN — OXYCODONE AND ACETAMINOPHEN 2 TABLET(S): 5; 325 TABLET ORAL at 10:00

## 2017-08-16 RX ADMIN — HYDROMORPHONE HYDROCHLORIDE 2 MILLIGRAM(S): 2 INJECTION INTRAMUSCULAR; INTRAVENOUS; SUBCUTANEOUS at 14:12

## 2017-08-16 RX ADMIN — Medication 81 MILLIGRAM(S): at 13:11

## 2017-08-16 RX ADMIN — Medication 100 MILLIGRAM(S): at 06:31

## 2017-08-16 RX ADMIN — Medication 25 MILLIGRAM(S): at 17:09

## 2017-08-16 RX ADMIN — OXYCODONE AND ACETAMINOPHEN 2 TABLET(S): 5; 325 TABLET ORAL at 10:30

## 2017-08-16 RX ADMIN — HYDROMORPHONE HYDROCHLORIDE 2 MILLIGRAM(S): 2 INJECTION INTRAMUSCULAR; INTRAVENOUS; SUBCUTANEOUS at 23:22

## 2017-08-16 RX ADMIN — FAMOTIDINE 20 MILLIGRAM(S): 10 INJECTION INTRAVENOUS at 13:17

## 2017-08-16 RX ADMIN — Medication 100 MILLIGRAM(S): at 22:52

## 2017-08-16 RX ADMIN — Medication 100 MILLIGRAM(S): at 13:11

## 2017-08-16 RX ADMIN — HYDROMORPHONE HYDROCHLORIDE 2 MILLIGRAM(S): 2 INJECTION INTRAMUSCULAR; INTRAVENOUS; SUBCUTANEOUS at 19:54

## 2017-08-16 RX ADMIN — Medication 1000 MILLIGRAM(S): at 06:15

## 2017-08-16 RX ADMIN — Medication 1: at 13:12

## 2017-08-16 RX ADMIN — HYDROMORPHONE HYDROCHLORIDE 2 MILLIGRAM(S): 2 INJECTION INTRAMUSCULAR; INTRAVENOUS; SUBCUTANEOUS at 22:52

## 2017-08-16 RX ADMIN — Medication 12.5 MILLIGRAM(S): at 08:29

## 2017-08-16 RX ADMIN — HYDROMORPHONE HYDROCHLORIDE 2 MILLIGRAM(S): 2 INJECTION INTRAMUSCULAR; INTRAVENOUS; SUBCUTANEOUS at 19:24

## 2017-08-16 RX ADMIN — ONDANSETRON 4 MILLIGRAM(S): 8 TABLET, FILM COATED ORAL at 06:32

## 2017-08-16 RX ADMIN — Medication 400 MILLIGRAM(S): at 04:00

## 2017-08-16 NOTE — PROGRESS NOTE ADULT - SUBJECTIVE AND OBJECTIVE BOX
VITAL SIGNS    Telemetry:      Vital Signs Last 24 Hrs  T(C): 37.5 (17 @ 08:00), Max: 37.5 (17 @ 04:00)  T(F): 99.5 (17 @ 08:00), Max: 99.5 (17 @ 04:00)  HR: 77 (17 @ 09:00) (69 - 98)  BP: --  RR: 23 (17 @ 09:00) (4 - 35)  SpO2: 100% (17 @ 09:00) (97% - 100%)                   08-15 @ 07:01  -   @ 07:00  --------------------------------------------------------  IN: 1220 mL / OUT: 2330 mL / NET: -1110 mL     @ 07:01  -   @ 11:44  --------------------------------------------------------  IN: 37 mL / OUT: 150 mL / NET: -113 mL          Daily     Daily Weight in k.2 (16 Aug 2017 00:00)        CAPILLARY BLOOD GLUCOSE  133 (16 Aug 2017 09:00)  165 (16 Aug 2017 07:00)  166 (16 Aug 2017 06:00)  168 (16 Aug 2017 05:00)  147 (16 Aug 2017 04:00)  138 (16 Aug 2017 03:00)  96 (16 Aug 2017 02:00)  98 (16 Aug 2017 01:00)  132 (16 Aug 2017 00:00)  150 (15 Aug 2017 23:00)  93 (15 Aug 2017 21:00)  104 (15 Aug 2017 20:00)  120 (15 Aug 2017 19:00)  121 (15 Aug 2017 18:00)  145 (15 Aug 2017 17:00)  159 (15 Aug 2017 16:00)  194 (15 Aug 2017 15:00)              Drains:     MS         [  ] Drainage:                 L Pleural  [  ]  Drainage:                R Pleural  [  ]  Drainage:    Pacing Wires        [  ]   Settings:                                  Isolated  [  ]    Coumadin    [ ] YES          [  ]      NO         Reason:                               PHYSICAL EXAM        Neurology: alert and oriented x 3, nonfocal, no gross deficits    CV :    Sternal Wound :  CDI , Stable    Lungs:    Abdomen: soft, nontender, nondistended, positive bowel sounds, last bowel movement     :               Extremities:               cefuroxime  IVPB 1500 milliGRAM(s) IV Intermittent every 12 hours  sodium chloride 0.9%. 1000 milliLiter(s) IV Continuous <Continuous>  famotidine Injectable 20 milliGRAM(s) IV Push daily  docusate sodium 100 milliGRAM(s) Oral three times a day  aspirin  chewable 81 milliGRAM(s) Oral daily  dextrose 5%. 1000 milliLiter(s) IV Continuous <Continuous>  dextrose Gel 1 Dose(s) Oral once PRN  dextrose 50% Injectable 12.5 Gram(s) IV Push once  dextrose 50% Injectable 25 Gram(s) IV Push once  dextrose 50% Injectable 25 Gram(s) IV Push once  glucagon  Injectable 1 milliGRAM(s) IntraMuscular once PRN  insulin lispro (HumaLOG) corrective regimen sliding scale   SubCutaneous three times a day before meals  insulin lispro (HumaLOG) corrective regimen sliding scale   SubCutaneous at bedtime  metoprolol 12.5 milliGRAM(s) Oral two times a day  oxyCODONE    5 mG/acetaminophen 325 mG 1 Tablet(s) Oral every 6 hours PRN  oxyCODONE    5 mG/acetaminophen 325 mG 2 Tablet(s) Oral every 6 hours PRN                              Physical Therapy Rec:   Home  [  ]   Home w/ PT  [  ]  Rehab  [  ]    Discussed with Cardiothoracic Team at AM rounds. I have pain and it hurts when I swallow  VITAL SIGNS    Telemetry:  Nsr 70    Vital Signs Last 24 Hrs  T(C): 37.5 (17 @ 08:00), Max: 37.5 (17 @ 04:00)  T(F): 99.5 (17 @ 08:00), Max: 99.5 (17 @ 04:00)  HR: 77 (17 @ 09:00) (69 - 98)  BP: --  RR: 23 (17 @ 09:00) (4 - 35)  SpO2: 100% (17 @ 09:00) (97% - 100%)                   08-15 @ 07:01  -   @ 07:00  --------------------------------------------------------  IN: 1220 mL / OUT: 2330 mL / NET: -1110 mL     @ 07:01  -   @ 11:44  --------------------------------------------------------  IN: 37 mL / OUT: 150 mL / NET: -113 mL          Daily     Daily Weight in k.2 (16 Aug 2017 00:00)        CAPILLARY BLOOD GLUCOSE  133 (16 Aug 2017 09:00)  165 (16 Aug 2017 07:00)  166 (16 Aug 2017 06:00)  168 (16 Aug 2017 05:00)  147 (16 Aug 2017 04:00)  138 (16 Aug 2017 03:00)  96 (16 Aug 2017 02:00)  98 (16 Aug 2017 01:00)  132 (16 Aug 2017 00:00)  150 (15 Aug 2017 23:00)  93 (15 Aug 2017 21:00)  104 (15 Aug 2017 20:00)  120 (15 Aug 2017 19:00)  121 (15 Aug 2017 18:00)  145 (15 Aug 2017 17:00)  159 (15 Aug 2017 16:00)  194 (15 Aug 2017 15:00)              Drains:     MS         [  ] Drainage:                 L Pleural  [  ]  Drainage:                R Pleural  [ x ]  Drainage:    Pacing Wires        [x  ]   Settings:                                  Isolated  [  ]    Coumadin    [ ] YES          [ x      NO         Reason:                               PHYSICAL EXAM        Neurology: alert and oriented x 3, nonfocal, no gross deficits    CV : S1 S2 , RRR     Sternal Wound :  CDI , Stable    Lungs:cta, R ct lws    Abdomen: soft, nontender, nondistended, positive bowel sounds, last bowel movement preop    :         due to void      Extremities:        - edema, negative calve tenderness,             cefuroxime  IVPB 1500 milliGRAM(s) IV Intermittent every 12 hours  sodium chloride 0.9%. 1000 milliLiter(s) IV Continuous <Continuous>  famotidine Injectable 20 milliGRAM(s) IV Push daily  docusate sodium 100 milliGRAM(s) Oral three times a day  aspirin  chewable 81 milliGRAM(s) Oral daily  dextrose 5%. 1000 milliLiter(s) IV Continuous <Continuous>  dextrose Gel 1 Dose(s) Oral once PRN  dextrose 50% Injectable 12.5 Gram(s) IV Push once  dextrose 50% Injectable 25 Gram(s) IV Push once  dextrose 50% Injectable 25 Gram(s) IV Push once  glucagon  Injectable 1 milliGRAM(s) IntraMuscular once PRN  insulin lispro (HumaLOG) corrective regimen sliding scale   SubCutaneous three times a day before meals  insulin lispro (HumaLOG) corrective regimen sliding scale   SubCutaneous at bedtime  metoprolol 12.5 milliGRAM(s) Oral two times a day  oxyCODONE    5 mG/acetaminophen 325 mG 1 Tablet(s) Oral every 6 hours PRN  oxyCODONE    5 mG/acetaminophen 325 mG 2 Tablet(s) Oral every 6 hours PRN                              Physical Therapy Rec:   Home  [  ]   Home w/ PT  [  ]  Rehab  [  ]         pending    Discussed with Cardiothoracic Team at AM rounds.

## 2017-08-16 NOTE — PROGRESS NOTE ADULT - ASSESSMENT
Stable day 1 post-op.  Renal function good.  No significant arrhythmias.    Plan: Metoprolol started.          OOB          Pulmonary toilet.

## 2017-08-16 NOTE — PROGRESS NOTE ADULT - SUBJECTIVE AND OBJECTIVE BOX
SUBJECTIVE ASSESSMENT:  83y Female s/p AVR, POD #1. Pt seeen and examined, with no compliants.    Vital Signs Last 24 Hrs  T(C): 37.3 (16 Aug 2017 00:00), Max: 37.3 (16 Aug 2017 00:00)  T(F): 99.1 (16 Aug 2017 00:00), Max: 99.1 (16 Aug 2017 00:00)  HR: 78 (16 Aug 2017 00:00) (69 - 100)  BP: 158/83 (15 Aug 2017 07:26) (158/83 - 158/83)  BP(mean): --  RR: 17 (16 Aug 2017 00:00) (4 - 29)  SpO2: 100% (16 Aug 2017 00:00) (97% - 100%)  I&O's Detail    15 Aug 2017 07:01  -  16 Aug 2017 00:49  --------------------------------------------------------  IN:    Albumin 5%  - 250 mL: 1000 mL    dexmedetomidine Infusion: 16.5 mL    insulin Infusion: 20 mL    sodium chloride 0.9%.: 100 mL  Total IN: 1136.5 mL    OUT:    Chest Tube: 165 mL    Chest Tube: 220 mL    Indwelling Catheter - Urethral: 1230 mL  Total OUT: 1615 mL    Total NET: -478.5 mL      PHYSICAL EXAM:  Neurological: A&Ox3  Cardiovascular: RRR, no murmurs or rub  Respiratory: CTA B/L  Gastrointestinal: Soft, non-tender, positive bowel sounds  Extremities: Positive pulses, no edema  Incision Sites: Clean, dry and intact    - Pre-operative EF normal  - Extubated within 24 hours    LABS:                        12.9   13.5  )-----------( 66       ( 15 Aug 2017 14:08 )             37.7     PT/INR - ( 15 Aug 2017 14:08 )   PT: 13.2 sec;   INR: 1.21 ratio         PTT - ( 15 Aug 2017 14:08 )  PTT:34.0 sec  08-15    144  |  105  |  35<H>  ----------------------------<  210<H>  4.7   |  24  |  1.23    Ca    8.4      15 Aug 2017 14:08    TPro  5.2<L>  /  Alb  3.6  /  TBili  1.2  /  DBili  x   /  AST  38  /  ALT  15  /  AlkPhos  45  08-15    ABG - ( 15 Aug 2017 22:48 )  pH: 7.36  /  pCO2: 40    /  pO2: 184   / HCO3: 22    / Base Excess: -2.6  /  SaO2: 100

## 2017-08-16 NOTE — PROGRESS NOTE ADULT - SUBJECTIVE AND OBJECTIVE BOX
JOHN LUND  MRN#:  [id number]    The patient is a 83y Female who was seen, evaluated, & examined with the CTICU staff on rounds and later in the day with a multidisciplinary care plan formulated & implemented.  All available clinical, laboratory, radiographic, pharmacologic, and electrocardiographic data were reviewed & analyzed.      The patient was in the CTICU in critical condition secondary to persistent cardiopulmonary dysfunction, hemodynamically significant hypovolemia-shock, resolved hemodynamically significant bradycardia, and stress hyperglycemia.      Respiratory status required supplemental oxygen, the following of ABG’s with A-line monitoring, and continuous pulse oximetry monitoring for support & to evaluate for & prevent further decompensation secondary to persistent cardiopulmonary dysfunction.     Invasive hemodynamic monitoring with an A-line was required for the following of continuous MAP/BP monitoring to ensure adequate cardiovascular support and to evaluate for & help prevent decompensation while receiving intermittent volume expansion and cessation of external epicardial pacing secondary to resolved hemodynamically significant hypovolemia-shock and resolved hemodynamically significant bradycardia.       Metabolic stability, stress hyperglycemia, & infection prophylaxis required an IV regular Insulin drip & the following of serial glucose levels to help achieve & maintain euglycemia.      Patient required more than the usual postoperative critical care management and I provided 80 minutes of non-continuous care to the patient.  Discussed at length with the CTICU staff and helped coordinate care.

## 2017-08-16 NOTE — PHYSICAL THERAPY INITIAL EVALUATION ADULT - LIVES WITH, PROFILE
Patient lives alone in an elevator equipped apartment building in [place] and was independent with ADLs and self care prior to admission. Patient stated she walks daily and tries to remain active./alone Patient lives alone in an elevator equipped apartment building in [place] and was independent with ADLs and self care prior to admission. Patient stated she walks daily and tries to remain active.  Patient reports good family support & will have some assist upon returninh home./alone

## 2017-08-16 NOTE — PHYSICAL THERAPY INITIAL EVALUATION ADULT - CRITERIA FOR SKILLED THERAPEUTIC INTERVENTIONS
predicted duration of therapy intervention/anticipated equipment needs at discharge/rehab potential/impairments found/functional limitations in following categories

## 2017-08-16 NOTE — PROGRESS NOTE ADULT - SUBJECTIVE AND OBJECTIVE BOX
Post-op day 1 AVR.  C/O musculoskeletal pain.  No c/o dyspnea at rest.  No atrial arrhthymias.  IOccasional PVcs are present.       REVIEW OF SYSTEMS:  CARDIOVASCULAR: No dyspnea or palpitations  All other review of systems is negative unless indicated above    Medications:  cefuroxime  IVPB 1500 milliGRAM(s) IV Intermittent every 12 hours  sodium chloride 0.9%. 1000 milliLiter(s) IV Continuous <Continuous>  famotidine Injectable 20 milliGRAM(s) IV Push daily  docusate sodium 100 milliGRAM(s) Oral three times a day  insulin Infusion 1 Unit(s)/Hr IV Continuous <Continuous>  aspirin  chewable 81 milliGRAM(s) Oral daily  dextrose 5%. 1000 milliLiter(s) IV Continuous <Continuous>  dextrose Gel 1 Dose(s) Oral once PRN  dextrose 50% Injectable 12.5 Gram(s) IV Push once  dextrose 50% Injectable 25 Gram(s) IV Push once  dextrose 50% Injectable 25 Gram(s) IV Push once  glucagon  Injectable 1 milliGRAM(s) IntraMuscular once PRN  insulin lispro (HumaLOG) corrective regimen sliding scale   SubCutaneous three times a day before meals  insulin lispro (HumaLOG) corrective regimen sliding scale   SubCutaneous at bedtime  metoprolol 12.5 milliGRAM(s) Oral two times a day      Physical Exam:  Vitals:  T(C): 37.5 (17 @ 08:00), Max: 37.5 (17 @ 04:00)  HR: 81 (17 @ 07:00) (69 - 98)  BP: --  BP(mean): --  RR: 6 (17 @ 07:00) (4 - 35)  SpO2: 100% (17 @ 07:00) (97% - 100%)  Wt(kg): --  Daily     Daily Weight in k.2 (16 Aug 2017 00:00)  I&O's Summary    15 Aug 2017 07:01  -  16 Aug 2017 07:00  --------------------------------------------------------  IN: 1220 mL / OUT: 2330 mL / NET: -1110 mL        Appearance:  Normal, NAD  Eyes:  PERRL, EOMI  HEENT: Normal oral muscosa, NC/AT  Neck:  No JVD sitting  Respiratory: Clear to auscultation bilaterally.  Sternum stable.  Cardiovascular: Normal S1 and S2 faint systolic murmur base and LSB.  No rubs or gallops  Abdomen:   BS normal, Soft,  Non-tender without organomegally or masses                Complete Blood Count + Automated Diff (17 @ 01:21)    WBC Count: 9.0 K/uL    RBC Count: 3.26 M/uL    Hemoglobin: 10.7 g/dL    Hematocrit: 31.8 %    Mean Cell Volume: 97.5 fl    Mean Cell Hemoglobin: 32.7 pg    Mean Cell Hemoglobin Conc: 33.6 gm/dL    Red Cell Distrib Width: 13.1 %    Platelet Count - Automated: 66 K/uL    Auto Neutrophil #: 8.2 K/uL    Auto Lymphocyte #: 0.5 K/uL    Auto Monocyte #: 0.4 K/uL    Auto Eosinophil #: 0.0 K/uL    Auto Basophil #: 0.0 K/uL    Auto Neutrophil %: 90.5: Differential percentages must be correlated with absolute numbers for  clinical significance. %    Auto Lymphocyte %: 5.3 %    Auto Monocyte %: 4.1 %    Auto Eosinophil %: 0.1 %    Auto Basophil %: 0.0 %        144  |  106  |  32<H>  ----------------------------<  102<H>  4.1   |  20<L>  |  1.38<H>    Ca    8.9      16 Aug 2017 01:21    TPro  6.3  /  Alb  4.4  /  TBili  1.7<H>  /  DBili  x   /  AST  54<H>  /  ALT  24  /  AlkPhos  32<L>      PT/INR - ( 15 Aug 2017 14:08 )   PT: 13.2 sec;   INR: 1.21 ratio         PTT - ( 15 Aug 2017 14:08 )  PTT:34.0 sec  CARDIAC MARKERS ( 15 Aug 2017 14:08 )  x     / 1.49 ng/mL / 335 U/L / x     / 52.3 ng/mL        Blood Gas Arterial, Lactate: 1.4 (17 @ 06:26)    Blood Gas Profile - Arterial (08.16.17 @ 06:26)    pH, Arterial: 7.40    pCO2, Arterial: 36 mmHg    pO2, Arterial: 199 mmHg    HCO3, Arterial: 22 mmoL/L    Base Excess, Arterial: -2.0 mmol/L    Oxygen Saturation, Arterial: 100 %    Total CO2, Arterial: 23 mmoL/L    FIO2, Arterial: 32    Blood Gas Source Arterial: Arterial          CXR  minimal blunting LCP angle

## 2017-08-16 NOTE — PROGRESS NOTE ADULT - ASSESSMENT
83 year old female with h/o ckd, palpitation's, MVP, migraine headaches, and nonrheumatic aortic valve stenosis, scheduled for aortic valve repair.  8/15/17 s/p AVR t  Post op unremarkable  Transferred to sdu 8/16 83 year old female with h/o ckd, palpitation's, MVP, migraine headaches, and nonrheumatic aortic valve stenosis, scheduled for aortic valve repair.  8/15/17 s/p AVR t  Post op thrombocytopenia not on dvt prophylaxis  C/o sore throat pain , placed on a dysphagia diet  Transferred to sdu 8/16

## 2017-08-17 DIAGNOSIS — F45.8 OTHER SOMATOFORM DISORDERS: ICD-10-CM

## 2017-08-17 DIAGNOSIS — J93.9 PNEUMOTHORAX, UNSPECIFIED: ICD-10-CM

## 2017-08-17 LAB
ALBUMIN SERPL ELPH-MCNC: 4.1 G/DL — SIGNIFICANT CHANGE UP (ref 3.3–5)
ALP SERPL-CCNC: 37 U/L — LOW (ref 40–120)
ALT FLD-CCNC: 26 U/L RC — SIGNIFICANT CHANGE UP (ref 10–45)
ANION GAP SERPL CALC-SCNC: 14 MMOL/L — SIGNIFICANT CHANGE UP (ref 5–17)
ANION GAP SERPL CALC-SCNC: 15 MMOL/L — SIGNIFICANT CHANGE UP (ref 5–17)
ANION GAP SERPL CALC-SCNC: 16 MMOL/L — SIGNIFICANT CHANGE UP (ref 5–17)
AST SERPL-CCNC: 39 U/L — SIGNIFICANT CHANGE UP (ref 10–40)
BILIRUB SERPL-MCNC: 1.2 MG/DL — SIGNIFICANT CHANGE UP (ref 0.2–1.2)
BUN SERPL-MCNC: 44 MG/DL — HIGH (ref 7–23)
CALCIUM SERPL-MCNC: 8.6 MG/DL — SIGNIFICANT CHANGE UP (ref 8.4–10.5)
CALCIUM SERPL-MCNC: 9.1 MG/DL — SIGNIFICANT CHANGE UP (ref 8.4–10.5)
CALCIUM SERPL-MCNC: 9.5 MG/DL — SIGNIFICANT CHANGE UP (ref 8.4–10.5)
CHLORIDE SERPL-SCNC: 100 MMOL/L — SIGNIFICANT CHANGE UP (ref 96–108)
CHLORIDE SERPL-SCNC: 102 MMOL/L — SIGNIFICANT CHANGE UP (ref 96–108)
CHLORIDE SERPL-SCNC: 99 MMOL/L — SIGNIFICANT CHANGE UP (ref 96–108)
CO2 SERPL-SCNC: 20 MMOL/L — LOW (ref 22–31)
CO2 SERPL-SCNC: 23 MMOL/L — SIGNIFICANT CHANGE UP (ref 22–31)
CO2 SERPL-SCNC: 24 MMOL/L — SIGNIFICANT CHANGE UP (ref 22–31)
CREAT SERPL-MCNC: 1.75 MG/DL — HIGH (ref 0.5–1.3)
CREAT SERPL-MCNC: 1.87 MG/DL — HIGH (ref 0.5–1.3)
CREAT SERPL-MCNC: 1.88 MG/DL — HIGH (ref 0.5–1.3)
GLUCOSE SERPL-MCNC: 144 MG/DL — HIGH (ref 70–99)
GLUCOSE SERPL-MCNC: 158 MG/DL — HIGH (ref 70–99)
GLUCOSE SERPL-MCNC: 188 MG/DL — HIGH (ref 70–99)
HCT VFR BLD CALC: 28.5 % — LOW (ref 34.5–45)
HCT VFR BLD CALC: 31.2 % — LOW (ref 34.5–45)
HGB BLD-MCNC: 10.6 G/DL — LOW (ref 11.5–15.5)
HGB BLD-MCNC: 9.8 G/DL — LOW (ref 11.5–15.5)
MCHC RBC-ENTMCNC: 33.4 PG — SIGNIFICANT CHANGE UP (ref 27–34)
MCHC RBC-ENTMCNC: 33.8 GM/DL — SIGNIFICANT CHANGE UP (ref 32–36)
MCHC RBC-ENTMCNC: 33.9 PG — SIGNIFICANT CHANGE UP (ref 27–34)
MCHC RBC-ENTMCNC: 34.6 GM/DL — SIGNIFICANT CHANGE UP (ref 32–36)
MCV RBC AUTO: 98 FL — SIGNIFICANT CHANGE UP (ref 80–100)
MCV RBC AUTO: 98.9 FL — SIGNIFICANT CHANGE UP (ref 80–100)
PLATELET # BLD AUTO: 78 K/UL — LOW (ref 150–400)
PLATELET # BLD AUTO: 85 K/UL — LOW (ref 150–400)
POTASSIUM SERPL-MCNC: 4.8 MMOL/L — SIGNIFICANT CHANGE UP (ref 3.5–5.3)
POTASSIUM SERPL-MCNC: 5 MMOL/L — SIGNIFICANT CHANGE UP (ref 3.5–5.3)
POTASSIUM SERPL-MCNC: 5.3 MMOL/L — SIGNIFICANT CHANGE UP (ref 3.5–5.3)
POTASSIUM SERPL-SCNC: 4.8 MMOL/L — SIGNIFICANT CHANGE UP (ref 3.5–5.3)
POTASSIUM SERPL-SCNC: 5 MMOL/L — SIGNIFICANT CHANGE UP (ref 3.5–5.3)
POTASSIUM SERPL-SCNC: 5.3 MMOL/L — SIGNIFICANT CHANGE UP (ref 3.5–5.3)
PROT SERPL-MCNC: 6.4 G/DL — SIGNIFICANT CHANGE UP (ref 6–8.3)
RBC # BLD: 2.91 M/UL — LOW (ref 3.8–5.2)
RBC # BLD: 3.16 M/UL — LOW (ref 3.8–5.2)
RBC # FLD: 13 % — SIGNIFICANT CHANGE UP (ref 10.3–14.5)
RBC # FLD: 13.3 % — SIGNIFICANT CHANGE UP (ref 10.3–14.5)
SODIUM SERPL-SCNC: 136 MMOL/L — SIGNIFICANT CHANGE UP (ref 135–145)
SODIUM SERPL-SCNC: 137 MMOL/L — SIGNIFICANT CHANGE UP (ref 135–145)
SODIUM SERPL-SCNC: 140 MMOL/L — SIGNIFICANT CHANGE UP (ref 135–145)
TSH SERPL-MCNC: 3.71 UIU/ML — SIGNIFICANT CHANGE UP (ref 0.27–4.2)
WBC # BLD: 15.2 K/UL — HIGH (ref 3.8–10.5)
WBC # BLD: 16.6 K/UL — HIGH (ref 3.8–10.5)
WBC # FLD AUTO: 15.2 K/UL — HIGH (ref 3.8–10.5)
WBC # FLD AUTO: 16.6 K/UL — HIGH (ref 3.8–10.5)

## 2017-08-17 PROCEDURE — 71010: CPT | Mod: 26,77

## 2017-08-17 PROCEDURE — 71010: CPT | Mod: 26

## 2017-08-17 RX ORDER — POLYETHYLENE GLYCOL 3350 17 G/17G
17 POWDER, FOR SOLUTION ORAL DAILY
Qty: 0 | Refills: 0 | Status: DISCONTINUED | OUTPATIENT
Start: 2017-08-17 | End: 2017-08-22

## 2017-08-17 RX ORDER — ONDANSETRON 8 MG/1
4 TABLET, FILM COATED ORAL ONCE
Qty: 0 | Refills: 0 | Status: COMPLETED | OUTPATIENT
Start: 2017-08-17 | End: 2017-08-17

## 2017-08-17 RX ORDER — METOPROLOL TARTRATE 50 MG
25 TABLET ORAL EVERY 8 HOURS
Qty: 0 | Refills: 0 | Status: DISCONTINUED | OUTPATIENT
Start: 2017-08-17 | End: 2017-08-18

## 2017-08-17 RX ORDER — SODIUM CHLORIDE 9 MG/ML
250 INJECTION INTRAMUSCULAR; INTRAVENOUS; SUBCUTANEOUS ONCE
Qty: 0 | Refills: 0 | Status: COMPLETED | OUTPATIENT
Start: 2017-08-17 | End: 2017-08-17

## 2017-08-17 RX ADMIN — HYDROMORPHONE HYDROCHLORIDE 2 MILLIGRAM(S): 2 INJECTION INTRAMUSCULAR; INTRAVENOUS; SUBCUTANEOUS at 10:30

## 2017-08-17 RX ADMIN — Medication 100 MILLIGRAM(S): at 21:39

## 2017-08-17 RX ADMIN — POLYETHYLENE GLYCOL 3350 17 GRAM(S): 17 POWDER, FOR SOLUTION ORAL at 11:48

## 2017-08-17 RX ADMIN — Medication 25 MILLIGRAM(S): at 05:56

## 2017-08-17 RX ADMIN — Medication 81 MILLIGRAM(S): at 11:49

## 2017-08-17 RX ADMIN — HYDROMORPHONE HYDROCHLORIDE 2 MILLIGRAM(S): 2 INJECTION INTRAMUSCULAR; INTRAVENOUS; SUBCUTANEOUS at 13:30

## 2017-08-17 RX ADMIN — Medication 1: at 16:34

## 2017-08-17 RX ADMIN — FAMOTIDINE 20 MILLIGRAM(S): 10 INJECTION INTRAVENOUS at 11:48

## 2017-08-17 RX ADMIN — Medication 100 MILLIGRAM(S): at 05:56

## 2017-08-17 RX ADMIN — HYDROMORPHONE HYDROCHLORIDE 2 MILLIGRAM(S): 2 INJECTION INTRAMUSCULAR; INTRAVENOUS; SUBCUTANEOUS at 05:56

## 2017-08-17 RX ADMIN — HYDROMORPHONE HYDROCHLORIDE 2 MILLIGRAM(S): 2 INJECTION INTRAMUSCULAR; INTRAVENOUS; SUBCUTANEOUS at 14:38

## 2017-08-17 RX ADMIN — ONDANSETRON 4 MILLIGRAM(S): 8 TABLET, FILM COATED ORAL at 21:39

## 2017-08-17 RX ADMIN — Medication 25 MILLIGRAM(S): at 12:44

## 2017-08-17 RX ADMIN — HYDROMORPHONE HYDROCHLORIDE 2 MILLIGRAM(S): 2 INJECTION INTRAMUSCULAR; INTRAVENOUS; SUBCUTANEOUS at 19:18

## 2017-08-17 RX ADMIN — HYDROMORPHONE HYDROCHLORIDE 2 MILLIGRAM(S): 2 INJECTION INTRAMUSCULAR; INTRAVENOUS; SUBCUTANEOUS at 20:15

## 2017-08-17 RX ADMIN — ONDANSETRON 4 MILLIGRAM(S): 8 TABLET, FILM COATED ORAL at 11:48

## 2017-08-17 RX ADMIN — SODIUM CHLORIDE 500 MILLILITER(S): 9 INJECTION INTRAMUSCULAR; INTRAVENOUS; SUBCUTANEOUS at 16:30

## 2017-08-17 RX ADMIN — HYDROMORPHONE HYDROCHLORIDE 2 MILLIGRAM(S): 2 INJECTION INTRAMUSCULAR; INTRAVENOUS; SUBCUTANEOUS at 09:48

## 2017-08-17 NOTE — PROGRESS NOTE ADULT - SUBJECTIVE AND OBJECTIVE BOX
C/O pain at CT tube site and incisional pain.  Had short run of SVT yesterday.  Frequent APCs which is patient's baseline and was reasonably controlled as outpatient on low dose beta blockers.  Mild dyspnea and cough    REVIEW OF SYSTEMS:  CARDIOVASCULAR: No anginal chest pain  All other review of systems is negative unless indicated above    Medications:  famotidine Injectable 20 milliGRAM(s) IV Push daily  docusate sodium 100 milliGRAM(s) Oral three times a day  aspirin  chewable 81 milliGRAM(s) Oral daily  dextrose Gel 1 Dose(s) Oral once PRN  dextrose 50% Injectable 25 Gram(s) IV Push once  insulin lispro (HumaLOG) corrective regimen sliding scale   SubCutaneous three times a day before meals  insulin lispro (HumaLOG) corrective regimen sliding scale   SubCutaneous at bedtime  HYDROmorphone   Tablet 2 milliGRAM(s) Oral every 3 hours PRN  benzocaine 15 mG/menthol 3.6 mG Lozenge 1 Lozenge Oral every 3 hours PRN  ALPRAZolam 0.25 milliGRAM(s) Oral daily PRN  metoprolol 25 milliGRAM(s) Oral two times a day      Physical Exam:  Vitals:  T(C): 36.7 (17 @ 07:31), Max: 36.7 (17 @ 10:40)  HR: 79 (17 @ 07:31) (72 - 84)  BP: 153/69 (17 @ 07:31) (125/55 - 153/69)  BP(mean): 87 (17 @ 04:45) (79 - 90)  RR: 18 (17 @ 07:31) (18 - 18)  SpO2: 95% (17 @ 07:31) (95% - 98%)  Wt(kg): --  Daily     Daily Weight in k.7 (17 Aug 2017 04:45)  I&O's Summary    16 Aug 2017 07:01  -  17 Aug 2017 07:00  --------------------------------------------------------  IN: 277 mL / OUT: 715 mL / NET: -438 mL        Appearance:  Normal, NAD  Eyes:  PERRL, EOMI  HEENT: Normal oral mus[place]a, NC/AT  Neck:  No JVD  Respiratory: Clear to auscultation bilaterally  Cardiovascular: Normal S1 and S2 with 1/6 systolic murmur LSB. No rubs or gallops  Abdomen:   BS normal, Soft,  Non-tender without organomegally or masses  Extremities: Without edema, pulses are full              Complete Blood Count (17 @ 03:08)    WBC Count: 16.6 K/uL    RBC Count: 3.16 M/uL    Hemoglobin: 10.6 g/dL    Hematocrit: 31.2 %    Mean Cell Volume: 98.9 fl    Mean Cell Hemoglobin: 33.4 pg    Mean Cell Hemoglobin Conc: 33.8 gm/dL    Red Cell Distrib Width: 13.3 %    Platelet Count - Automated: 85 K/uL          140  |  102  |  44<H>  ----------------------------<  158<H>  5.3   |  24  |  1.87<H>    Ca    9.5      17 Aug 2017 03:08    TPro  6.4  /  Alb  4.1  /  TBili  1.2  /  DBili  x   /  AST  39  /  ALT  26  /  AlkPhos  37<L>      PT/INR - ( 15 Aug 2017 14:08 )   PT: 13.2 sec;   INR: 1.21 ratio         PTT - ( 15 Aug 2017 14:08 )  PTT:34.0 sec  CARDIAC MARKERS ( 15 Aug 2017 14:08 )  x     / 1.49 ng/mL / 335 U/L / x     / 52.3 ng/mL

## 2017-08-17 NOTE — PROGRESS NOTE ADULT - ASSESSMENT
83 year old female with h/o ckd, palpitation's, MVP, migraine headaches, and nonrheumatic aortic valve stenosis, scheduled for aortic valve repair.  8/15/17 s/p AVR t  Post op thrombocytopenia not on dvt prophylaxis  C/o sore throat pain , placed on a dysphagia diet  Transferred to sdu 8/16  Mild improvement in eating, no BM since OR  CXR with small r pneumo, on water seal, will clamp for 3 hours and repeat cxr as per Dr bell  transfer to floor

## 2017-08-17 NOTE — CONSULT NOTE ADULT - ASSESSMENT
84 yo F S/P AVR for leaky heart valve C/O globus sensation 82 yo F S/P AVR for leaky heart valve C/O globus sensation since Sx on 8/15. FOE revealed diffused ecchymosis of base of tongue. No hematoma

## 2017-08-17 NOTE — CONSULT NOTE ADULT - ATTENDING COMMENTS
Patient seen and examined. Laryngoscopy reveals ecchymosis along laryngeal surface of epiglottis and bilateral vocal processes/interarytenoid space. Suspect mild trauma related to intubation but no hematoma or edema appreciated. This is likely contributing to her globus sensation. Recommend soft diet, advance as tolerated. PPI to reduce reflux. If symptoms do not reduce within 1-2 weeks pt should f/u with ENT for further eval.

## 2017-08-17 NOTE — CONSULT NOTE ADULT - SUBJECTIVE AND OBJECTIVE BOX
Patient is a 83y Female  being evaluated for  CLD            HPI:  83 year old female with h/o CKD 3, MVP, migraines aortic valve stenosis, s/p AVR 8/15/17  c/p CP at CT site and dysphagia  Voiding without difficulty. No dysuria, hematuria, urgency.        PAST MEDICAL & SURGICAL HISTORY:  Scoliosis unspecified, unspecified site  Nonrheumatic aortic (valve) stenosis  Elevated serum creatinine: baseline creat 1.9    Palpitations  Mitral valve prolapse  Migraine  Bronchiolectasis  H/O colonoscopy  Cataract extraction status  S/P rotator cuff repair: left      Allergies    No Known Allergies    Intolerances    epinephrine (Other)      Social History:    FAMILY HISTORY:  FH: MI (myocardial infarction) (Father)  FH: CAD (coronary artery disease) (Father)      PHYSICAL EXAM:  T(F): 98 (08-17-17 @ 07:31), Max: 98 (08-16-17 @ 10:40)  HR: 79 (08-17-17 @ 07:31)  BP: 153/69 (08-17-17 @ 07:31)  BP(mean): 87 (08-17-17 @ 04:45)  RR: 18 (08-17-17 @ 07:31)  SpO2: 95% (08-17-17 @ 07:31)  Wt(kg): --    Constitutional: no acute distress, well developed  Head: NC AT  Mouth/Throat : dry  Eyes: PERRL, no discharge, no icterus  Neck: No JVD, bruit, adenopathy   Respiratory:dec bs bases bilat, no wheezes, rales, nl effort  Cardiovascular: regular rate, S1 and S2 no rub or gallop  Abd: : +BS, soft, NT , no rebound or guarding, no bruits  Musculoskeletal: no edema or tenderness  Lymph nodes: no cervical adenopathy  Extremities: no edema or cyanosis, palpable pulses  Neurological: A/O x 3, nl coordination and tone, nl reflexes  Skin: no rash or erythema      I and O's:    08-16 @ 07:01  -  08-17 @ 07:00  --------------------------------------------------------  IN: 277 mL / OUT: 715 mL / NET: -438 mL            Height (cm): 160.02 (08-15 @ 07:26)  Weight (kg): 54.6 (08-15 @ 07:26)  BMI (kg/m2): 21.3 (08-15 @ 07:26)      REVIEW OF SYSTEMS  CONSTITUTIONAL: No weakness, fevers or chills  HENT : no sore throat  Eyes: no blurred vision or phootphobia  RESPIRATORY: No cough, wheezing, hemoptysis; No shortness of breath  CARDIOVASCULAR: + chest pain No   palpitations, orthopnea, PND  GI : no abd pains, nausea or vomiting, rectal bleeding, constipation, diarrhea, GERD, melena, n/v  GENITOURINARY: No dysuria, frequency or hematuria, flank pain, urgency  Musculoskeletal:  No back pain, joint pain, myalgias  Skin : no itching or rash  Neuro: No dizziness, focal weakness, HA  Endo/Heme: No polydipsia. No easy bruising  Psychiatric: No depression or memory loss  All other review of systems is negative unless indicated above.      MEDICATIONS  (STANDING):  famotidine Injectable 20 milliGRAM(s) IV Push daily  docusate sodium 100 milliGRAM(s) Oral three times a day  aspirin  chewable 81 milliGRAM(s) Oral daily  dextrose 50% Injectable 25 Gram(s) IV Push once  insulin lispro (HumaLOG) corrective regimen sliding scale   SubCutaneous three times a day before meals  insulin lispro (HumaLOG) corrective regimen sliding scale   SubCutaneous at bedtime  metoprolol 25 milliGRAM(s) Oral two times a day      LABS:    08-16 @ 07:01  -  08-17 @ 07:00  --------------------------------------------------------  IN: 277 mL / OUT: 715 mL / NET: -438 mL        Height (cm): 160.02 (08-15 @ 07:26)  Weight (kg): 54.6 (08-15 @ 07:26)  BMI (kg/m2): 21.3 (08-15 @ 07:26)            CBC Full  -  ( 17 Aug 2017 03:08 )  WBC Count : 16.6 K/uL  Hemoglobin : 10.6 g/dL  Hematocrit : 31.2 %  Platelet Count - Automated : 85 K/uL  Mean Cell Volume : 98.9 fl  Mean Cell Hemoglobin : 33.4 pg  Mean Cell Hemoglobin Concentration : 33.8 gm/dL  Auto Neutrophil # : x  Auto Lymphocyte # : x  Auto Monocyte # : x  Auto Eosinophil # : x  Auto Basophil # : x  Auto Neutrophil % : x  Auto Lymphocyte % : x  Auto Monocyte % : x  Auto Eosinophil % : x  Auto Basophil % : x    08-17    140  |  102  |  44<H>  ----------------------------<  158<H>  5.3   |  24  |  1.87<H>    Ca    9.5      17 Aug 2017 03:08    TPro  6.4  /  Alb  4.1  /  TBili  1.2  /  DBili  x   /  AST  39  /  ALT  26  /  AlkPhos  37<L>  08-17

## 2017-08-17 NOTE — CONSULT NOTE ADULT - SUBJECTIVE AND OBJECTIVE BOX
CC: globus sensation    HPI: 83 year old female with h/o elevated creatinine x years, occasional palpitation, MVP, migraine headaches, and nonrheumatic aortic valve stenosis admitted for valvular repair C/O globus sensation since the Sx on 8/15. Pt was extubated on the same day of the procedure. Pt states that foods get stuck in her throat when she swallows, but go down eventually. Pt admits to hoarseness but deies SOB, cough, hemoptysis, sore throat, fevers.      .PAST MEDICAL & SURGICAL HISTORY:  Scoliosis unspecified, unspecified site  Nonrheumatic aortic (valve) stenosis  Elevated serum creatinine: baseline  renal Md Hileplivier  Palpitations  Mitral valve prolapse  Migraine  Bronchiolectasis  H/O colonoscopy  Cataract extraction status  S/P rotator cuff repair: left    Allergies    No Known Allergies    Intolerances    epinephrine (Other)    MEDICATIONS  (STANDING):  famotidine Injectable 20 milliGRAM(s) IV Push daily  docusate sodium 100 milliGRAM(s) Oral three times a day  aspirin  chewable 81 milliGRAM(s) Oral daily  dextrose 50% Injectable 25 Gram(s) IV Push once  insulin lispro (HumaLOG) corrective regimen sliding scale   SubCutaneous three times a day before meals  insulin lispro (HumaLOG) corrective regimen sliding scale   SubCutaneous at bedtime  polyethylene glycol 3350 17 Gram(s) Oral daily  metoprolol 25 milliGRAM(s) Oral every 8 hours    MEDICATIONS  (PRN):  dextrose Gel 1 Dose(s) Oral once PRN Blood Glucose LESS THAN 70 milliGRAM(s)/deciLiter  HYDROmorphone   Tablet 2 milliGRAM(s) Oral every 3 hours PRN Moderate Pain (4 - 6)  benzocaine 15 mG/menthol 3.6 mG Lozenge 1 Lozenge Oral every 3 hours PRN Sore Throat  ALPRAZolam 0.25 milliGRAM(s) Oral daily PRN anxiety    Social History: No tobacco use    ROS: ENT, GI, , CV, Pulm, Neuro, Psych, MS, Heme, Endo, Constitutional; all negative except as noted in HPI    Vital Signs Last 24 Hrs  T(C): 36.7 (17 Aug 2017 15:15), Max: 36.7 (17 Aug 2017 07:31)  T(F): 98 (17 Aug 2017 15:15), Max: 98 (17 Aug 2017 07:31)  HR: 78 (17 Aug 2017 15:15) (76 - 84)  BP: 133/62 (17 Aug 2017 15:15) (125/55 - 153/69)  BP(mean): 87 (17 Aug 2017 04:45) (79 - 90)  RR: 18 (17 Aug 2017 15:15) (18 - 18)  SpO2: 93% (17 Aug 2017 15:15) (93% - 97%)                          10.6   16.6  )-----------( 85       ( 17 Aug 2017 03:08 )             31.2    08-17    137  |  99  |  44<H>  ----------------------------<  188<H>  4.8   |  23  |  1.75<H>    Ca    9.1      17 Aug 2017 14:56    TPro  6.4  /  Alb  4.1  /  TBili  1.2  /  DBili  x   /  AST  39  /  ALT  26  /  AlkPhos  37<L>  08-17       PHYSICAL EXAM:  Gen: NAD, well-developed  Head: Normocephalic, Atraumatic  Face: no edema/erythema/fluctuance, parotid glands soft without mass  Eyes: PERRL, EOMI, no scleral injection  Nose: Nares bilaterally patent, no discharge  Mouth: Mucosa moist, tongue/uvula midline, oropharynx clear  Neck: Flat, supple, no lymphadenopathy, trachea midline, no masses  Resp: no use of accessory muscles, no stridor  CV: no peripheral edema/cyanosis CC: globus sensation    HPI: 83 year old female with h/o elevated creatinine x years, occasional palpitation, MVP, migraine headaches, and nonrheumatic aortic valve stenosis admitted for valvular repair C/O globus sensation since the Sx on 8/15. Pt was extubated on the same day of the procedure. Pt states that foods get stuck in her throat when she swallows, but go down eventually. Pt admits to hoarseness but deies SOB, cough, hemoptysis, sore throat, fevers.      .PAST MEDICAL & SURGICAL HISTORY:  Scoliosis unspecified, unspecified site  Nonrheumatic aortic (valve) stenosis  Elevated serum creatinine: baseline  renal Md Hileplivier  Palpitations  Mitral valve prolapse  Migraine  Bronchiolectasis  H/O colonoscopy  Cataract extraction status  S/P rotator cuff repair: left    Allergies    No Known Allergies    Intolerances    epinephrine (Other)    MEDICATIONS  (STANDING):  famotidine Injectable 20 milliGRAM(s) IV Push daily  docusate sodium 100 milliGRAM(s) Oral three times a day  aspirin  chewable 81 milliGRAM(s) Oral daily  dextrose 50% Injectable 25 Gram(s) IV Push once  insulin lispro (HumaLOG) corrective regimen sliding scale   SubCutaneous three times a day before meals  insulin lispro (HumaLOG) corrective regimen sliding scale   SubCutaneous at bedtime  polyethylene glycol 3350 17 Gram(s) Oral daily  metoprolol 25 milliGRAM(s) Oral every 8 hours    MEDICATIONS  (PRN):  dextrose Gel 1 Dose(s) Oral once PRN Blood Glucose LESS THAN 70 milliGRAM(s)/deciLiter  HYDROmorphone   Tablet 2 milliGRAM(s) Oral every 3 hours PRN Moderate Pain (4 - 6)  benzocaine 15 mG/menthol 3.6 mG Lozenge 1 Lozenge Oral every 3 hours PRN Sore Throat  ALPRAZolam 0.25 milliGRAM(s) Oral daily PRN anxiety    Social History: No tobacco use    ROS: ENT, GI, , CV, Pulm, Neuro, Psych, MS, Heme, Endo, Constitutional; all negative except as noted in HPI    Vital Signs Last 24 Hrs  T(C): 36.7 (17 Aug 2017 15:15), Max: 36.7 (17 Aug 2017 07:31)  T(F): 98 (17 Aug 2017 15:15), Max: 98 (17 Aug 2017 07:31)  HR: 78 (17 Aug 2017 15:15) (76 - 84)  BP: 133/62 (17 Aug 2017 15:15) (125/55 - 153/69)  BP(mean): 87 (17 Aug 2017 04:45) (79 - 90)  RR: 18 (17 Aug 2017 15:15) (18 - 18)  SpO2: 93% (17 Aug 2017 15:15) (93% - 97%)                          10.6   16.6  )-----------( 85       ( 17 Aug 2017 03:08 )             31.2    08-17    137  |  99  |  44<H>  ----------------------------<  188<H>  4.8   |  23  |  1.75<H>    Ca    9.1      17 Aug 2017 14:56    TPro  6.4  /  Alb  4.1  /  TBili  1.2  /  DBili  x   /  AST  39  /  ALT  26  /  AlkPhos  37<L>  08-17       PHYSICAL EXAM:  Gen: NAD, well-developed  Head: Normocephalic, Atraumatic  Face: no edema/erythema/fluctuance, parotid glands soft without mass  Eyes: PERRL, EOMI, no scleral injection  Nose: Nares bilaterally patent, no discharge  Mouth: Mucosa moist, tongue/uvula midline, oropharynx clear  Neck: Flat, supple, no lymphadenopathy, trachea midline, no masses  Resp: no use of accessory muscles, no stridor  CV: no peripheral edema/cyanosis    FOE/Laryngoscopy- Scope #2 - Diffused ecchymosis in base of tongue and B/L posterior VC cords, no hematoma, no active bleeding, no edema, B/L VC mobile and intact, upper airway patent, no pooling of secretions, good secretions. CC: globus sensation    HPI: 83 year old female with h/o elevated creatinine x years, occasional palpitation, MVP, migraine headaches, and nonrheumatic aortic valve stenosis admitted for valvular repair C/O globus sensation since the Sx on 8/15. Pt was extubated on the same day of the procedure. Pt states that foods get stuck in her throat when she swallows, but go down eventually. Pt admits to hoarseness but denies SOB, cough, hemoptysis, sore throat, fevers.      .PAST MEDICAL & SURGICAL HISTORY:  Scoliosis unspecified, unspecified site  Nonrheumatic aortic (valve) stenosis  Elevated serum creatinine: baseline  renal Md Hileplivier  Palpitations  Mitral valve prolapse  Migraine  Bronchiolectasis  H/O colonoscopy  Cataract extraction status  S/P rotator cuff repair: left    Allergies    No Known Allergies    Intolerances    epinephrine (Other)    MEDICATIONS  (STANDING):  famotidine Injectable 20 milliGRAM(s) IV Push daily  docusate sodium 100 milliGRAM(s) Oral three times a day  aspirin  chewable 81 milliGRAM(s) Oral daily  dextrose 50% Injectable 25 Gram(s) IV Push once  insulin lispro (HumaLOG) corrective regimen sliding scale   SubCutaneous three times a day before meals  insulin lispro (HumaLOG) corrective regimen sliding scale   SubCutaneous at bedtime  polyethylene glycol 3350 17 Gram(s) Oral daily  metoprolol 25 milliGRAM(s) Oral every 8 hours    MEDICATIONS  (PRN):  dextrose Gel 1 Dose(s) Oral once PRN Blood Glucose LESS THAN 70 milliGRAM(s)/deciLiter  HYDROmorphone   Tablet 2 milliGRAM(s) Oral every 3 hours PRN Moderate Pain (4 - 6)  benzocaine 15 mG/menthol 3.6 mG Lozenge 1 Lozenge Oral every 3 hours PRN Sore Throat  ALPRAZolam 0.25 milliGRAM(s) Oral daily PRN anxiety    Social History: No tobacco use    ROS: ENT, GI, , CV, Pulm, Neuro, Psych, MS, Heme, Endo, Constitutional; all negative except as noted in HPI    Vital Signs Last 24 Hrs  T(C): 36.7 (17 Aug 2017 15:15), Max: 36.7 (17 Aug 2017 07:31)  T(F): 98 (17 Aug 2017 15:15), Max: 98 (17 Aug 2017 07:31)  HR: 78 (17 Aug 2017 15:15) (76 - 84)  BP: 133/62 (17 Aug 2017 15:15) (125/55 - 153/69)  BP(mean): 87 (17 Aug 2017 04:45) (79 - 90)  RR: 18 (17 Aug 2017 15:15) (18 - 18)  SpO2: 93% (17 Aug 2017 15:15) (93% - 97%)                          10.6   16.6  )-----------( 85       ( 17 Aug 2017 03:08 )             31.2    08-17    137  |  99  |  44<H>  ----------------------------<  188<H>  4.8   |  23  |  1.75<H>    Ca    9.1      17 Aug 2017 14:56    TPro  6.4  /  Alb  4.1  /  TBili  1.2  /  DBili  x   /  AST  39  /  ALT  26  /  AlkPhos  37<L>  08-17       PHYSICAL EXAM:  Gen: NAD, well-developed  Head: Normocephalic, Atraumatic  Face: no edema/erythema/fluctuance, parotid glands soft without mass  Eyes: PERRL, EOMI, no scleral injection  Nose: Nares bilaterally patent, no discharge  Mouth: Mucosa moist, tongue/uvula midline, oropharynx clear  Neck: Flat, supple, no lymphadenopathy, trachea midline, no masses  Resp: no use of accessory muscles, no stridor  CV: no peripheral edema/cyanosis    FOE/Laryngoscopy- Scope #2 - Ecchymosis along epiglottis and B/L posterior VC cords, no hematoma, no active bleeding, no edema, B/L VC mobile and intact, upper airway patent, no pooling of secretions.

## 2017-08-17 NOTE — CONSULT NOTE ADULT - ASSESSMENT
Imp:  83y Female with CKD 3  baseline creat preop 1.9  s/p AVR  creatinine rising (was sig below baseline postop likely due to bypass and volume expansion)  adequate urine output  monitor I&O's, electrolytes and renal function  avoid NSAIDS  dose meds for GFR ~25ml/min  poor PO intake - may need additional IVF

## 2017-08-17 NOTE — PROGRESS NOTE ADULT - SUBJECTIVE AND OBJECTIVE BOX
Im sore, my throat hurts and the tube hurts    VITAL SIGNS    Telemetry:  NSR 60-80    Vital Signs Last 24 Hrs  T(C): 36.7 (17 @ 07:31), Max: 36.7 (17 @ 10:40)  T(F): 98 (17 @ 07:31), Max: 98 (17 @ 10:40)  HR: 79 (17 @ 07:31) (72 - 84)  BP: 153/69 (17 @ 07:31) (125/55 - 153/69)  RR: 18 (17 @ 07:31) (18 - 18)  SpO2: 95% (17 @ 07:31) (95% - 98%)                    @ 07:01  -   @ 07:00  --------------------------------------------------------  IN: 277 mL / OUT: 715 mL / NET: -438 mL          Daily     Daily Weight in k.7 (17 Aug 2017 04:45)        CAPILLARY BLOOD GLUCOSE  142 (17 Aug 2017 07:31)  173 (16 Aug 2017 21:30)  142 (16 Aug 2017 17:10)  166 (16 Aug 2017 13:07)              Drains:     MS         [  ] Drainage:                 L Pleural  [  ]  Drainage:                R Pleural  [x  Drainage:     Pacing Wires        [  x]   Settings:   vvi 56                               Isolated  [  ]    Coumadin    [ ] YES          [  ]      NO         Reason:                               PHYSICAL EXAM        Neurology: alert and oriented x 3, nonfocal, no gross deficits    CV : S1 S2 , RRR     Sternal Wound :  CDI , Stable    Lungs: diminished    Abdomen: soft, nontender, nondistended, positive bowel sounds, last bowel movement preop    :           voiding    Extremities:       -  edema, negative calve tenderness,            famotidine Injectable 20 milliGRAM(s) IV Push daily  docusate sodium 100 milliGRAM(s) Oral three times a day  aspirin  chewable 81 milliGRAM(s) Oral daily  dextrose Gel 1 Dose(s) Oral once PRN  dextrose 50% Injectable 25 Gram(s) IV Push once  insulin lispro (HumaLOG) corrective regimen sliding scale   SubCutaneous three times a day before meals  insulin lispro (HumaLOG) corrective regimen sliding scale   SubCutaneous at bedtime  HYDROmorphone   Tablet 2 milliGRAM(s) Oral every 3 hours PRN  benzocaine 15 mG/menthol 3.6 mG Lozenge 1 Lozenge Oral every 3 hours PRN  ALPRAZolam 0.25 milliGRAM(s) Oral daily PRN  metoprolol 25 milliGRAM(s) Oral two times a day                              Physical Therapy Rec:   Home  [  ]   Home w/ PT  [  ]  Rehab  [  ]    Discussed with Cardiothoracic Team at AM rounds.

## 2017-08-18 DIAGNOSIS — I48.91 UNSPECIFIED ATRIAL FIBRILLATION: ICD-10-CM

## 2017-08-18 LAB
ANION GAP SERPL CALC-SCNC: 15 MMOL/L — SIGNIFICANT CHANGE UP (ref 5–17)
BASE EXCESS BLDV CALC-SCNC: -0.8 MMOL/L — SIGNIFICANT CHANGE UP (ref -2–2)
BASOPHILS # BLD AUTO: 0 K/UL — SIGNIFICANT CHANGE UP (ref 0–0.2)
BASOPHILS NFR BLD AUTO: 0.1 % — SIGNIFICANT CHANGE UP (ref 0–2)
BUN SERPL-MCNC: 44 MG/DL — HIGH (ref 7–23)
CALCIUM SERPL-MCNC: 8.9 MG/DL — SIGNIFICANT CHANGE UP (ref 8.4–10.5)
CHLORIDE SERPL-SCNC: 100 MMOL/L — SIGNIFICANT CHANGE UP (ref 96–108)
CO2 BLDV-SCNC: 25 MMOL/L — SIGNIFICANT CHANGE UP (ref 22–30)
CO2 SERPL-SCNC: 22 MMOL/L — SIGNIFICANT CHANGE UP (ref 22–31)
CREAT SERPL-MCNC: 1.73 MG/DL — HIGH (ref 0.5–1.3)
EOSINOPHIL # BLD AUTO: 0 K/UL — SIGNIFICANT CHANGE UP (ref 0–0.5)
EOSINOPHIL NFR BLD AUTO: 0 % — SIGNIFICANT CHANGE UP (ref 0–6)
GLUCOSE SERPL-MCNC: 136 MG/DL — HIGH (ref 70–99)
HCO3 BLDV-SCNC: 24 MMOL/L — SIGNIFICANT CHANGE UP (ref 21–29)
HCT VFR BLD CALC: 28 % — LOW (ref 34.5–45)
HGB BLD-MCNC: 9.5 G/DL — LOW (ref 11.5–15.5)
LYMPHOCYTES # BLD AUTO: 1 K/UL — SIGNIFICANT CHANGE UP (ref 1–3.3)
LYMPHOCYTES # BLD AUTO: 7.4 % — LOW (ref 13–44)
MCHC RBC-ENTMCNC: 33.5 PG — SIGNIFICANT CHANGE UP (ref 27–34)
MCHC RBC-ENTMCNC: 34 GM/DL — SIGNIFICANT CHANGE UP (ref 32–36)
MCV RBC AUTO: 98.5 FL — SIGNIFICANT CHANGE UP (ref 80–100)
MONOCYTES # BLD AUTO: 1.1 K/UL — HIGH (ref 0–0.9)
MONOCYTES NFR BLD AUTO: 8.3 % — SIGNIFICANT CHANGE UP (ref 2–14)
NEUTROPHILS # BLD AUTO: 10.8 K/UL — HIGH (ref 1.8–7.4)
NEUTROPHILS NFR BLD AUTO: 84.1 % — HIGH (ref 43–77)
PCO2 BLDV: 43 MMHG — SIGNIFICANT CHANGE UP (ref 35–50)
PH BLDV: 7.37 — SIGNIFICANT CHANGE UP (ref 7.35–7.45)
PLATELET # BLD AUTO: 85 K/UL — LOW (ref 150–400)
PO2 BLDV: 27 MMHG — SIGNIFICANT CHANGE UP (ref 25–45)
POTASSIUM SERPL-MCNC: 4.9 MMOL/L — SIGNIFICANT CHANGE UP (ref 3.5–5.3)
POTASSIUM SERPL-SCNC: 4.9 MMOL/L — SIGNIFICANT CHANGE UP (ref 3.5–5.3)
RBC # BLD: 2.85 M/UL — LOW (ref 3.8–5.2)
RBC # FLD: 13.1 % — SIGNIFICANT CHANGE UP (ref 10.3–14.5)
SAO2 % BLDV: 43 % — LOW (ref 67–88)
SODIUM SERPL-SCNC: 137 MMOL/L — SIGNIFICANT CHANGE UP (ref 135–145)
WBC # BLD: 12.9 K/UL — HIGH (ref 3.8–10.5)
WBC # FLD AUTO: 12.9 K/UL — HIGH (ref 3.8–10.5)

## 2017-08-18 PROCEDURE — 93010 ELECTROCARDIOGRAM REPORT: CPT

## 2017-08-18 PROCEDURE — 93308 TTE F-UP OR LMTD: CPT | Mod: 26

## 2017-08-18 PROCEDURE — 93321 DOPPLER ECHO F-UP/LMTD STD: CPT | Mod: 26

## 2017-08-18 PROCEDURE — 71010: CPT | Mod: 26

## 2017-08-18 PROCEDURE — 71010: CPT | Mod: 26,77

## 2017-08-18 RX ORDER — AMIODARONE HYDROCHLORIDE 400 MG/1
400 TABLET ORAL THREE TIMES A DAY
Qty: 0 | Refills: 0 | Status: DISCONTINUED | OUTPATIENT
Start: 2017-08-18 | End: 2017-08-21

## 2017-08-18 RX ORDER — FAMOTIDINE 10 MG/ML
20 INJECTION INTRAVENOUS DAILY
Qty: 0 | Refills: 0 | Status: DISCONTINUED | OUTPATIENT
Start: 2017-08-18 | End: 2017-08-25

## 2017-08-18 RX ORDER — AMIODARONE HYDROCHLORIDE 400 MG/1
150 TABLET ORAL ONCE
Qty: 0 | Refills: 0 | Status: COMPLETED | OUTPATIENT
Start: 2017-08-18 | End: 2017-08-18

## 2017-08-18 RX ADMIN — AMIODARONE HYDROCHLORIDE 400 MILLIGRAM(S): 400 TABLET ORAL at 06:51

## 2017-08-18 RX ADMIN — AMIODARONE HYDROCHLORIDE 400 MILLIGRAM(S): 400 TABLET ORAL at 15:00

## 2017-08-18 RX ADMIN — Medication 100 MILLIGRAM(S): at 22:59

## 2017-08-18 RX ADMIN — HYDROMORPHONE HYDROCHLORIDE 2 MILLIGRAM(S): 2 INJECTION INTRAMUSCULAR; INTRAVENOUS; SUBCUTANEOUS at 04:35

## 2017-08-18 RX ADMIN — Medication 100 MILLIGRAM(S): at 15:00

## 2017-08-18 RX ADMIN — POLYETHYLENE GLYCOL 3350 17 GRAM(S): 17 POWDER, FOR SOLUTION ORAL at 12:17

## 2017-08-18 RX ADMIN — Medication 81 MILLIGRAM(S): at 12:17

## 2017-08-18 RX ADMIN — AMIODARONE HYDROCHLORIDE 400 MILLIGRAM(S): 400 TABLET ORAL at 22:59

## 2017-08-18 RX ADMIN — HYDROMORPHONE HYDROCHLORIDE 2 MILLIGRAM(S): 2 INJECTION INTRAMUSCULAR; INTRAVENOUS; SUBCUTANEOUS at 05:10

## 2017-08-18 RX ADMIN — AMIODARONE HYDROCHLORIDE 600 MILLIGRAM(S): 400 TABLET ORAL at 06:20

## 2017-08-18 RX ADMIN — FAMOTIDINE 20 MILLIGRAM(S): 10 INJECTION INTRAVENOUS at 12:17

## 2017-08-18 RX ADMIN — Medication 100 MILLIGRAM(S): at 06:52

## 2017-08-18 NOTE — PROGRESS NOTE ADULT - SUBJECTIVE AND OBJECTIVE BOX
Patient is a 83y Female  being evaluated for  Ckd  events noted  hypotension this am  feels weak but denies sob or cp            PHYSICAL EXAM:  Vital Signs Last 24 Hrs  T(C): 36.7 (18 Aug 2017 04:47), Max: 36.7 (17 Aug 2017 15:15)  T(F): 98 (18 Aug 2017 04:47), Max: 98 (17 Aug 2017 15:15)  HR: 92 (18 Aug 2017 06:55) (72 - 148)  BP: 115/79 (18 Aug 2017 06:55) (66/38 - 141/67)  BP(mean): --  RR: 20 (18 Aug 2017 06:55) (18 - 20)  SpO2: 96% (18 Aug 2017 06:55) (93% - 97%)  Constitutional: no acute distress, well developed  Respiratory:dec bs bases bilat, no wheezes, rales, nl effort  Cardiovascular: regular rate, S1 and S2 no rub or gallop  Extremities: no edema or cyanosis, palpable pulses  Neurological: A/O x 3, nl coordination and tone, nl reflexes        I and O's:    08-16 @ 07:01  -  08-17 @ 07:00  --------------------------------------------------------  IN: 277 mL / OUT: 715 mL / NET: -438 mL            Height (cm): 160.02 (08-15 @ 07:26)  Weight (kg): 54.6 (08-15 @ 07:26)  BMI (kg/m2): 21.3 (08-15 @ 07:26)      MEDICATIONS  (STANDING):  famotidine Injectable 20 milliGRAM(s) IV Push daily  docusate sodium 100 milliGRAM(s) Oral three times a day  aspirin  chewable 81 milliGRAM(s) Oral daily  dextrose 50% Injectable 25 Gram(s) IV Push once  insulin lispro (HumaLOG) corrective regimen sliding scale   SubCutaneous three times a day before meals  insulin lispro (HumaLOG) corrective regimen sliding scale   SubCutaneous at bedtime  polyethylene glycol 3350 17 Gram(s) Oral daily  amiodarone    Tablet 400 milliGRAM(s) Oral three times a day    MEDICATIONS  (PRN):  dextrose Gel 1 Dose(s) Oral once PRN Blood Glucose LESS THAN 70 milliGRAM(s)/deciLiter  HYDROmorphone   Tablet 2 milliGRAM(s) Oral every 3 hours PRN Moderate Pain (4 - 6)  benzocaine 15 mG/menthol 3.6 mG Lozenge 1 Lozenge Oral every 3 hours PRN Sore Throat  ALPRAZolam 0.25 milliGRAM(s) Oral daily PRN anxiety          LABS:    08-16 @ 07:01  -  08-17 @ 07:00  --------------------------------------------------------  IN: 277 mL / OUT: 715 mL / NET: -438 mL        Height (cm): 160.02 (08-15 @ 07:26)  Weight (kg): 54.6 (08-15 @ 07:26)  BMI (kg/m2): 21.3 (08-15 @ 07:26)            CBC Full  -  ( 17 Aug 2017 03:08 )  WBC Count : 16.6 K/uL  Hemoglobin : 10.6 g/dL  Hematocrit : 31.2 %  Platelet Count - Automated : 85 K/uL  Mean Cell Volume : 98.9 fl  Mean Cell Hemoglobin : 33.4 pg  Mean Cell Hemoglobin Concentration : 33.8 gm/dL  Auto Neutrophil # : x  Auto Lymphocyte # : x  Auto Monocyte # : x  Auto Eosinophil # : x  Auto Basophil # : x  Auto Neutrophil % : x  Auto Lymphocyte % : x  Auto Monocyte % : x  Auto Eosinophil % : x  Auto Basophil % : x    08-17    140  |  102  |  44<H>  ----------------------------<  158<H>  5.3   |  24  |  1.87<H>    Ca    9.5      17 Aug 2017 03:08    TPro  6.4  /  Alb  4.1  /  TBili  1.2  /  DBili  x   /  AST  39  /  ALT  26  /  AlkPhos  37<L>  08-17                          9.5    12.9  )-----------( 85       ( 18 Aug 2017 06:21 )             28.0   08-18    137  |  100  |  44<H>  ----------------------------<  136<H>  4.9   |  22  |  1.73<H>    Ca    8.9      18 Aug 2017 06:21    TPro  6.4  /  Alb  4.1  /  TBili  1.2  /  DBili  x   /  AST  39  /  ALT  26  /  AlkPhos  37<L>  08-17

## 2017-08-18 NOTE — PROGRESS NOTE ADULT - ASSESSMENT
Imp:  83y Female with CKD 3  baseline creat preop 1.9  s/p AVR  creatinine rising (was sig below baseline postop likely due to bypass and volume expansion)  adequate urine output  monitor I&O's, electrolytes and renal function  avoid NSAIDS  dose meds for GFR ~25ml/min  follow bp closely in this setting  as per cv/cts  dw pt

## 2017-08-18 NOTE — PROGRESS NOTE ADULT - SUBJECTIVE AND OBJECTIVE BOX
VITAL SIGNS    Telemetry:  rapid afib this am  Vital Signs Last 24 Hrs  T(C): 36.7 (08-18-17 @ 04:47), Max: 36.7 (08-17-17 @ 15:15)  T(F): 98 (08-18-17 @ 04:47), Max: 98 (08-17-17 @ 15:15)  HR: 92 (08-18-17 @ 06:55) (72 - 148)  BP: 115/79 (08-18-17 @ 06:55) (66/38 - 141/67)  RR: 20 (08-18-17 @ 06:55) (18 - 20)  SpO2: 96% (08-18-17 @ 06:55) (93% - 97%)            08-17 @ 07:01  -  08-18 @ 07:00  --------------------------------------------------------  IN: 1430 mL / OUT: 1275 mL / NET: 155 mL       Daily     Daily   Admit Wt: Drug Dosing Weight  Height (cm): 160.02 (15 Aug 2017 07:26)  Weight (kg): 54.6 (15 Aug 2017 07:26)  BMI (kg/m2): 21.3 (15 Aug 2017 07:26)  BSA (m2): 1.56 (15 Aug 2017 07:26)      CAPILLARY BLOOD GLUCOSE  143 (18 Aug 2017 07:23)  157 (18 Aug 2017 06:03)  153 (17 Aug 2017 21:40)  164 (17 Aug 2017 16:16)  145 (17 Aug 2017 11:31)                PHYSICAL EXAM    Subjective: "Hi.   Neurology: alert and oriented x 3, nonfocal, no gross deficits  CV : tele:  RSR  Sternal Wound :  CDI with dressing , Stable  Lungs: clear. RR easy, unlabored   Abdomen: soft, nontender, nondistended, positive bowel sounds, bowel movement   Neg N/V/D   :  pt voiding without difficulty   Extremities:   BURLESON; edema, neg calf tenderness.   PPP bilaterally      PW:  Chest tubes: VITAL SIGNS    Telemetry:  rapid afib up to 180 @ 0530  converted back to rsr 70-80 @ 0630   Vital Signs Last 24 Hrs  T(C): 36.7 (08-18-17 @ 04:47), Max: 36.7 (08-17-17 @ 15:15)  T(F): 98 (08-18-17 @ 04:47), Max: 98 (08-17-17 @ 15:15)  HR: 92 (08-18-17 @ 06:55) (72 - 148)  BP: 115/79 (08-18-17 @ 06:55) (66/38 - 141/67)  RR: 20 (08-18-17 @ 06:55) (18 - 20)  SpO2: 96% (08-18-17 @ 06:55) (93% - 97%)            08-17 @ 07:01  -  08-18 @ 07:00  --------------------------------------------------------  IN: 1430 mL / OUT: 1275 mL / NET: 155 mL       Daily     Daily   Admit Wt: Drug Dosing Weight  Height (cm): 160.02 (15 Aug 2017 07:26)  Weight (kg): 54.6 (15 Aug 2017 07:26)  BMI (kg/m2): 21.3 (15 Aug 2017 07:26)  BSA (m2): 1.56 (15 Aug 2017 07:26)      CAPILLARY BLOOD GLUCOSE  143 (18 Aug 2017 07:23)  157 (18 Aug 2017 06:03)  153 (17 Aug 2017 21:40)  164 (17 Aug 2017 16:16)  145 (17 Aug 2017 11:31)                PHYSICAL EXAM    Subjective: "Hi.   Neurology: alert and oriented x 3, nonfocal, no gross deficits  CV : tele:  rapid afib up to 180 @ 0530  converted back to rsr 70-80 @ 0630   Sternal Wound :  CDI with dressing , Stable  Lungs: clear. RR easy, unlabored   Abdomen: soft, nontender, nondistended, positive bowel sounds, + bowel movement   Neg N/V/D   :  pt voiding without difficulty   Extremities:   BURLESON; neg LE edema, neg calf tenderness.   PPP bilaterally      PW:  + VVI 55   Chest tubes: none

## 2017-08-18 NOTE — PROGRESS NOTE ADULT - ASSESSMENT
Atrial fibrillation post-op day 3.  No rub on exam.    Will order EKG  Continue beta blockers and amio load

## 2017-08-18 NOTE — PROGRESS NOTE ADULT - PROBLEM SELECTOR PLAN 5
amio load  ck electrolytes  d/c bb- hypotension this am  ck echo r/o pericardial effusion  keep pw attached and on

## 2017-08-18 NOTE — PROGRESS NOTE ADULT - SUBJECTIVE AND OBJECTIVE BOX
Had several hours of afib this early AM.  Now back in sinus.  On amio load.  Hypotensive during rapid rates up to 180 bpm.  Echo done at bedside, report pending    c/o fatigue.  Chest and neck discomfort.  Mild dyspnea.    REVIEW OF SYSTEMS:  CARDIOVASCULAR: No chest pain, dyspnea or palpitations  All other review of systems is negative unless indicated above    Medications:  docusate sodium 100 milliGRAM(s) Oral three times a day  aspirin  chewable 81 milliGRAM(s) Oral daily  dextrose Gel 1 Dose(s) Oral once PRN  dextrose 50% Injectable 25 Gram(s) IV Push once  insulin lispro (HumaLOG) corrective regimen sliding scale   SubCutaneous three times a day before meals  insulin lispro (HumaLOG) corrective regimen sliding scale   SubCutaneous at bedtime  HYDROmorphone   Tablet 2 milliGRAM(s) Oral every 3 hours PRN  benzocaine 15 mG/menthol 3.6 mG Lozenge 1 Lozenge Oral every 3 hours PRN  ALPRAZolam 0.25 milliGRAM(s) Oral daily PRN  polyethylene glycol 3350 17 Gram(s) Oral daily  amiodarone    Tablet 400 milliGRAM(s) Oral three times a day  famotidine    Tablet 20 milliGRAM(s) Oral daily      Physical Exam:  Vitals:  T(C): 36.7 (08-18-17 @ 04:47), Max: 36.7 (08-17-17 @ 15:15)  HR: 92 (08-18-17 @ 06:55) (72 - 148)  BP: 115/79 (08-18-17 @ 06:55) (66/38 - 141/67)  BP(mean): --  RR: 20 (08-18-17 @ 06:55) (18 - 20)  SpO2: 96% (08-18-17 @ 06:55) (93% - 97%)  Wt(kg): --  Daily     Daily   I&O's Summary    17 Aug 2017 07:01  -  18 Aug 2017 07:00  --------------------------------------------------------  IN: 1430 mL / OUT: 1275 mL / NET: 155 mL        Appearance:  Normal, NAD  Eyes:  PERRL, EOMI  HEENT: Normal oral muscosa, NC/AT  Neck:  No JVD  Respiratory: Clear to auscultation bilaterally  Cardiovascular: Normal S1 and S2 with faint systolic murmur LSB. No rubs or gallops  Abdomen:   BS normal, Soft,  Non-tender without organomegally or masses  Extremities: Without edema, pulses are full          08-18    Complete Blood Count + Automated Diff (08.18.17 @ 06:21)    WBC Count: 12.9 K/uL    RBC Count: 2.85 M/uL    Hemoglobin: 9.5 g/dL    Hematocrit: 28.0 %    Mean Cell Volume: 98.5 fl    Mean Cell Hemoglobin: 33.5 pg    Mean Cell Hemoglobin Conc: 34.0 gm/dL    Red Cell Distrib Width: 13.1 %    Platelet Count - Automated: 85 K/uL      Auto Neutrophil %: 84.1: Differential percentages must be correlated with absolute numbers for  clinical significance. %    Auto Lymphocyte %: 7.4 %    Auto Monocyte %: 8.3 %    Auto Eosinophil %: 0.0 %    Auto Basophil %: 0.1 %        137  |  100  |  44<H>  ----------------------------<  136<H>  4.9   |  22  |  1.73<H>    Ca    8.9      18 Aug 2017 06:21    TPro  6.4  /  Alb  4.1  /  TBili  1.2  /  DBili  x   /  AST  39  /  ALT  26  /  AlkPhos  37<L>  08-17

## 2017-08-18 NOTE — PROGRESS NOTE ADULT - ASSESSMENT
83 year old female with h/o ckd, palpitation's, MVP, migraine headaches, and nonrheumatic aortic valve stenosis, scheduled for aortic valve repair.  8/15/17 s/p AVR t  Post op thrombocytopenia not on dvt prophylaxis  C/o sore throat pain , placed on a dysphagia diet  Transferred to sdu 8/16  Mild improvement in eating, no BM since OR  CXR with small r pneumo, on water seal, will clamp for 3 hours and repeat cxr as per Dr bell- rt ct d/c 8/17   transfer to floor  8/18 rapid afib 180 @ 0530- hypotensive and diaphoretic bp 60's systolic- Dr. Ingram at the bedside; 12 lead ekg performed confirming rapid afib - rt Ej placed- amio 150 iv bolus and magnesium 2 grams ivpb give; ivf 500 cc ns and lalitha ivp given  bp increased to 150/60- pt converted back to rsr 70 @ 0630- amio load initiated and b-blockers d/c secondary to hypotension - Discussed with Dr. Bell- ck chest xray and echo r/o pericardial effusion

## 2017-08-19 LAB
ANION GAP SERPL CALC-SCNC: 15 MMOL/L — SIGNIFICANT CHANGE UP (ref 5–17)
BUN SERPL-MCNC: 45 MG/DL — HIGH (ref 7–23)
CALCIUM SERPL-MCNC: 8.8 MG/DL — SIGNIFICANT CHANGE UP (ref 8.4–10.5)
CHLORIDE SERPL-SCNC: 101 MMOL/L — SIGNIFICANT CHANGE UP (ref 96–108)
CO2 SERPL-SCNC: 22 MMOL/L — SIGNIFICANT CHANGE UP (ref 22–31)
CREAT SERPL-MCNC: 1.76 MG/DL — HIGH (ref 0.5–1.3)
GLUCOSE SERPL-MCNC: 149 MG/DL — HIGH (ref 70–99)
HCT VFR BLD CALC: 27.5 % — LOW (ref 34.5–45)
HGB BLD-MCNC: 9.4 G/DL — LOW (ref 11.5–15.5)
MAGNESIUM SERPL-MCNC: 2.6 MG/DL — SIGNIFICANT CHANGE UP (ref 1.6–2.6)
MCHC RBC-ENTMCNC: 33.4 PG — SIGNIFICANT CHANGE UP (ref 27–34)
MCHC RBC-ENTMCNC: 34 GM/DL — SIGNIFICANT CHANGE UP (ref 32–36)
MCV RBC AUTO: 98.2 FL — SIGNIFICANT CHANGE UP (ref 80–100)
PHOSPHATE SERPL-MCNC: 2.9 MG/DL — SIGNIFICANT CHANGE UP (ref 2.5–4.5)
PLATELET # BLD AUTO: 107 K/UL — LOW (ref 150–400)
POTASSIUM SERPL-MCNC: 4.5 MMOL/L — SIGNIFICANT CHANGE UP (ref 3.5–5.3)
POTASSIUM SERPL-SCNC: 4.5 MMOL/L — SIGNIFICANT CHANGE UP (ref 3.5–5.3)
RBC # BLD: 2.8 M/UL — LOW (ref 3.8–5.2)
RBC # FLD: 12.8 % — SIGNIFICANT CHANGE UP (ref 10.3–14.5)
SODIUM SERPL-SCNC: 138 MMOL/L — SIGNIFICANT CHANGE UP (ref 135–145)
WBC # BLD: 11.9 K/UL — HIGH (ref 3.8–10.5)
WBC # FLD AUTO: 11.9 K/UL — HIGH (ref 3.8–10.5)

## 2017-08-19 PROCEDURE — 71250 CT THORAX DX C-: CPT | Mod: 26

## 2017-08-19 RX ORDER — MAGNESIUM SULFATE 500 MG/ML
2 VIAL (ML) INJECTION ONCE
Qty: 0 | Refills: 0 | Status: COMPLETED | OUTPATIENT
Start: 2017-08-19 | End: 2017-08-19

## 2017-08-19 RX ORDER — HEPARIN SODIUM 5000 [USP'U]/ML
5000 INJECTION INTRAVENOUS; SUBCUTANEOUS EVERY 12 HOURS
Qty: 0 | Refills: 0 | Status: DISCONTINUED | OUTPATIENT
Start: 2017-08-19 | End: 2017-08-25

## 2017-08-19 RX ORDER — METOPROLOL TARTRATE 50 MG
25 TABLET ORAL
Qty: 0 | Refills: 0 | Status: DISCONTINUED | OUTPATIENT
Start: 2017-08-19 | End: 2017-08-20

## 2017-08-19 RX ORDER — ONDANSETRON 8 MG/1
4 TABLET, FILM COATED ORAL ONCE
Qty: 0 | Refills: 0 | Status: COMPLETED | OUTPATIENT
Start: 2017-08-19 | End: 2017-08-19

## 2017-08-19 RX ORDER — METOPROLOL TARTRATE 50 MG
2.5 TABLET ORAL ONCE
Qty: 0 | Refills: 0 | Status: COMPLETED | OUTPATIENT
Start: 2017-08-19 | End: 2017-08-19

## 2017-08-19 RX ORDER — ALPRAZOLAM 0.25 MG
0.25 TABLET ORAL ONCE
Qty: 0 | Refills: 0 | Status: DISCONTINUED | OUTPATIENT
Start: 2017-08-19 | End: 2017-08-19

## 2017-08-19 RX ADMIN — Medication 100 MILLIGRAM(S): at 05:55

## 2017-08-19 RX ADMIN — Medication 100 MILLIGRAM(S): at 14:01

## 2017-08-19 RX ADMIN — Medication 2.5 MILLIGRAM(S): at 02:51

## 2017-08-19 RX ADMIN — FAMOTIDINE 20 MILLIGRAM(S): 10 INJECTION INTRAVENOUS at 10:13

## 2017-08-19 RX ADMIN — POLYETHYLENE GLYCOL 3350 17 GRAM(S): 17 POWDER, FOR SOLUTION ORAL at 14:01

## 2017-08-19 RX ADMIN — Medication 50 GRAM(S): at 03:13

## 2017-08-19 RX ADMIN — HEPARIN SODIUM 5000 UNIT(S): 5000 INJECTION INTRAVENOUS; SUBCUTANEOUS at 10:12

## 2017-08-19 RX ADMIN — Medication 25 MILLIGRAM(S): at 17:59

## 2017-08-19 RX ADMIN — Medication 25 MILLIGRAM(S): at 07:28

## 2017-08-19 RX ADMIN — AMIODARONE HYDROCHLORIDE 400 MILLIGRAM(S): 400 TABLET ORAL at 05:55

## 2017-08-19 RX ADMIN — AMIODARONE HYDROCHLORIDE 400 MILLIGRAM(S): 400 TABLET ORAL at 23:03

## 2017-08-19 RX ADMIN — ONDANSETRON 4 MILLIGRAM(S): 8 TABLET, FILM COATED ORAL at 10:07

## 2017-08-19 RX ADMIN — Medication 100 MILLIGRAM(S): at 23:03

## 2017-08-19 RX ADMIN — Medication 0.25 MILLIGRAM(S): at 10:12

## 2017-08-19 RX ADMIN — HEPARIN SODIUM 5000 UNIT(S): 5000 INJECTION INTRAVENOUS; SUBCUTANEOUS at 23:03

## 2017-08-19 RX ADMIN — AMIODARONE HYDROCHLORIDE 400 MILLIGRAM(S): 400 TABLET ORAL at 14:01

## 2017-08-19 RX ADMIN — Medication 0.25 MILLIGRAM(S): at 20:44

## 2017-08-19 RX ADMIN — Medication 81 MILLIGRAM(S): at 10:13

## 2017-08-19 NOTE — SWALLOW BEDSIDE ASSESSMENT ADULT - COMMENTS
Hx contd: Seen by ENT re globus sensation, Laryngoscopy reveals ecchymosis along laryngeal surface of epiglottis and bilateral vocal processes/interarytenoid space. Suspect mild trauma related to intubation but no hematoma or edema appreciated. This is likely contributing to her globus sensation. Recommend soft diet, advance as tolerated. PPI to reduce reflux. If symptoms do not reduce within 1-2 weeks pt should f/u with ENT for further eval.

## 2017-08-19 NOTE — PROGRESS NOTE ADULT - ASSESSMENT
83 year old female with h/o ckd, palpitation's, MVP, migraine headaches, and nonrheumatic aortic valve stenosis, scheduled for aortic valve repair.  8/15/17 s/p AVR t  Post op thrombocytopenia not on dvt prophylaxis  C/o sore throat pain , placed on a dysphagia diet  Transferred to sdu 8/16  Mild improvement in eating, no BM since OR  CXR with small r pneumo, on water seal, will clamp for 3 hours and repeat cxr as per Dr bell- rt ct d/c 8/17   transfer to floor  8/18 rapid afib 180 @ 0530- hypotensive and diaphoretic bp 60's systolic- Dr. Ingram at the bedside; 12 lead ekg performed confirming rapid afib - rt Ej placed- amio 150 iv bolus and magnesium 2 grams ivpb give; ivf 500 cc ns and lalitha ivp given  bp increased to 150/60- pt converted back to rsr 70 @ 0630- amio load initiated and b-blockers d/c secondary to hypotension - Discussed with Dr. Bell- Echo small pericardial effusion, neg tamponade. chest xray: unchanged bilateral pleural effusions  8/19 bursts afib 100-150 - b-blockers resumed lop 25 bid; pt now rsr 70's since [id number]- zofran given for nausea; obtain chest ct r/o pericardial effusion   hep sq resumed plt > 100   discharge planning- home pt next week

## 2017-08-19 NOTE — PROGRESS NOTE ADULT - SUBJECTIVE AND OBJECTIVE BOX
VITAL SIGNS    Telemetry:  rsr  bursts of pafib from  100-150- now rsr 70's since 730   Vital Signs Last 24 Hrs  T(C): 37 (17 @ 04:34), Max: 37 (18-17 @ 20:20)  T(F): 98.6 (17 @ 04:34), Max: 98.6 (18-17 @ 20:20)  HR: 79 (17 @ 04:34) (79 - 145)  BP: 128/76 (17 @ 04:34) (102/68 - 158/81)  RR: 18 (17 @ 20:20) (18 - 18)  SpO2: 94% (17 @ 20:20) (93% - 94%)             @ 07:01  -   @ 07:00  --------------------------------------------------------  IN: 720 mL / OUT: 750 mL / NET: -30 mL     @ 07:01  -   @ 11:51  --------------------------------------------------------  IN: 720 mL / OUT: 300 mL / NET: 420 mL       Daily     Daily Weight in k.6 (19 Aug 2017 08:37)  Admit Wt: Drug Dosing Weight  Height (cm): 160.02 (15 Aug 2017 07:26)  Weight (kg): 54.6 (15 Aug 2017 07:26)  BMI (kg/m2): 21.3 (15 Aug 2017 07:26)  BSA (m2): 1.56 (15 Aug 2017 07:26)        PHYSICAL EXAM    Subjective: "I'm feeling very tired today."  Neurology: alert and oriented x 3, nonfocal, no gross deficits  CV : tele:   rsr  bursts of pafib from  100-150- now rsr 70's since 730   Sternal Wound :  CDI   +pw- VVI 55 MA 10   Lungs: clear. RR easy, unlabored   Abdomen: soft, nontender, nondistended, positive bowel sounds, +bowel movement   Neg /V/D   +nausea   :  pt voiding without difficulty   Extremities:   BURLESON; neg LE edema, neg calf tenderness.   PPP bilaterally      PW:  + VVI 55 ma 10   Chest tubes: None

## 2017-08-19 NOTE — PROGRESS NOTE ADULT - ASSESSMENT
Ms. Aragon had sustained atrial fibrillation on 8/18/17 with a rapid ventricular response, treated with IV metoprolol and amiodarone.  Now back in sinus rhythm on oral amiodarone load.  Still at risk for recurrent atrial fibrillation and I agree with antiarrhythmic treatment as prescribed.  No evidence of infection.  Renal function stable and optimal with baseline renal dysfunction.  No evidence of CHF.  Continue treatment as per CTS.

## 2017-08-19 NOTE — PROGRESS NOTE ADULT - SUBJECTIVE AND OBJECTIVE BOX
Very concerned over palpitations.  States she had mild episodes yesterday and one big episode which she found frightening    Review Of Systems:  Constitutional: No Fever, Chills,  No Fatigue, No Weight change   HEENT: No Blurred vision, No Headache   Respiratory: No Cough, No sputum production, No Wheezing, No Shortness of breath  Cardiovascular: +Chest Pain, +Palpitations, No Lightheadedness, No Falling, No Syncope, No VILLAFUERTE, No PND, No Orthopnea, No Peripehral Edema  Gastrointestinal: No Abdominal Pain, No Diarrhea, No Constipation, No Nausea, No Vomiting, Normal Appetite   Genitourinary: No Dysuria  Extremities: No Swelling, No Claudication,   Neurologic:  No Focal deficit, No Weakness, No Dysphagia, No Paresthesias, No Syncope  Skin: No Rash,  No Ecchymoses, No Wounds, No Tenderness, No Drainage     Medications:  docusate sodium 100 milliGRAM(s) Oral three times a day  aspirin  chewable 81 milliGRAM(s) Oral daily  dextrose Gel 1 Dose(s) Oral once PRN  dextrose 50% Injectable 25 Gram(s) IV Push once  insulin lispro (HumaLOG) corrective regimen sliding scale   SubCutaneous three times a day before meals  insulin lispro (HumaLOG) corrective regimen sliding scale   SubCutaneous at bedtime  HYDROmorphone   Tablet 2 milliGRAM(s) Oral every 3 hours PRN  benzocaine 15 mG/menthol 3.6 mG Lozenge 1 Lozenge Oral every 3 hours PRN  ALPRAZolam 0.25 milliGRAM(s) Oral daily PRN  polyethylene glycol 3350 17 Gram(s) Oral daily  amiodarone    Tablet 400 milliGRAM(s) Oral three times a day  famotidine    Tablet 20 milliGRAM(s) Oral daily  metoprolol 25 milliGRAM(s) Oral two times a day    PMH/PSH/FH/SH: Unchanged  Vitals:  T(C): 37 (08-19-17 @ 04:34), Max: 37 (08-18-17 @ 20:20)  HR: 79 (08-19-17 @ 04:34) (79 - 145)  BP: 128/76 (08-19-17 @ 04:34) (102/68 - 158/81)  BP(mean): --  RR: 18 (08-18-17 @ 20:20) (18 - 18)  SpO2: 94% (08-18-17 @ 20:20) (93% - 94%)  Wt(kg): --  Daily     Daily     Physical Exam:  Appearance:  Normal, NAD  Eyes: PERRL, EOMI  HENT:  Normal oral muscosa, NC/AT  Neck: without heptojugular reflux, carotid 2+ equal without bruits  No Thyromegaly  Cardiovascular: normal regular S1, physiologic split S2,  1/6 sysotlic ejection murmur, S4 gallop at apex  Respiratory: Clear to percussion and auscultation bilaterally  Gastrointestinal: Soft, Non-tender, Non-distended, BS+, No masses  Neurologic: Non-focal, No focal neurologic deficits  Skin: No rashes, No ecchymoses, No cyanosis, no peripheral edema  Pulses-2+ right dorsalis pedis, no pulse in left foot, both feet warm    08-19    138  |  101  |  45<H>  ----------------------------<  149<H>  4.5   |  22  |  1.76<H>    Ca    8.8      19 Aug 2017 03:13  Phos  2.9     08-19  Mg     2.6     08-19 08-18 @ 07:01  -  08-19 @ 07:00  --------------------------------------------------------  IN: 720 mL / OUT: 750 mL / NET: -30 mL    Cardiac Testing:  Telemetry: Atrial fibrillation with rate typically around 130/minute although up to 170 per minute

## 2017-08-19 NOTE — SWALLOW BEDSIDE ASSESSMENT ADULT - SWALLOW EVAL: DIAGNOSIS
Consult for bedside swallow evaluation received and appreciated. Chart reviewed, events noted. D/w RN Scarlet, per RN pt with no overt s/s of laryngeal penetration and/or aspiration upon PO intake. Case d/w NP Laura, per NP no indication for swallowing assessment at this time. NP to d/c order. This service to sign off at this time, please reconsult as clinically indicated and/or if clinical concern for aspiration arises.

## 2017-08-19 NOTE — PROGRESS NOTE ADULT - PROBLEM SELECTOR PLAN 5
pt converted to rsr- bp stable   amio load/ resume b-blockers today  ck electrolytes  keep pw attached and on

## 2017-08-20 LAB
ALBUMIN SERPL ELPH-MCNC: 3.5 G/DL — SIGNIFICANT CHANGE UP (ref 3.3–5)
ALP SERPL-CCNC: 210 U/L — HIGH (ref 40–120)
ALT FLD-CCNC: 76 U/L RC — HIGH (ref 10–45)
ANION GAP SERPL CALC-SCNC: 13 MMOL/L — SIGNIFICANT CHANGE UP (ref 5–17)
ANION GAP SERPL CALC-SCNC: 16 MMOL/L — SIGNIFICANT CHANGE UP (ref 5–17)
AST SERPL-CCNC: 75 U/L — HIGH (ref 10–40)
BILIRUB SERPL-MCNC: 0.8 MG/DL — SIGNIFICANT CHANGE UP (ref 0.2–1.2)
BUN SERPL-MCNC: 47 MG/DL — HIGH (ref 7–23)
BUN SERPL-MCNC: 52 MG/DL — HIGH (ref 7–23)
CALCIUM SERPL-MCNC: 8.3 MG/DL — LOW (ref 8.4–10.5)
CALCIUM SERPL-MCNC: 8.9 MG/DL — SIGNIFICANT CHANGE UP (ref 8.4–10.5)
CHLORIDE SERPL-SCNC: 101 MMOL/L — SIGNIFICANT CHANGE UP (ref 96–108)
CHLORIDE SERPL-SCNC: 99 MMOL/L — SIGNIFICANT CHANGE UP (ref 96–108)
CO2 SERPL-SCNC: 19 MMOL/L — LOW (ref 22–31)
CO2 SERPL-SCNC: 22 MMOL/L — SIGNIFICANT CHANGE UP (ref 22–31)
CREAT SERPL-MCNC: 1.85 MG/DL — HIGH (ref 0.5–1.3)
CREAT SERPL-MCNC: 1.9 MG/DL — HIGH (ref 0.5–1.3)
GLUCOSE SERPL-MCNC: 111 MG/DL — HIGH (ref 70–99)
GLUCOSE SERPL-MCNC: 155 MG/DL — HIGH (ref 70–99)
HAV IGM SER-ACNC: SIGNIFICANT CHANGE UP
HBV CORE IGM SER-ACNC: SIGNIFICANT CHANGE UP
HBV SURFACE AG SER-ACNC: SIGNIFICANT CHANGE UP
HCT VFR BLD CALC: 27 % — LOW (ref 34.5–45)
HCT VFR BLD CALC: 27.6 % — LOW (ref 34.5–45)
HCV AB S/CO SERPL IA: 0.06 S/CO — SIGNIFICANT CHANGE UP
HCV AB S/CO SERPL IA: 0.06 S/CO — SIGNIFICANT CHANGE UP
HCV AB SERPL-IMP: SIGNIFICANT CHANGE UP
HCV AB SERPL-IMP: SIGNIFICANT CHANGE UP
HGB BLD-MCNC: 9.3 G/DL — LOW (ref 11.5–15.5)
HGB BLD-MCNC: 9.3 G/DL — LOW (ref 11.5–15.5)
HIV 1 & 2 AB SERPL IA.RAPID: SIGNIFICANT CHANGE UP
MAGNESIUM SERPL-MCNC: 2.6 MG/DL — SIGNIFICANT CHANGE UP (ref 1.6–2.6)
MCHC RBC-ENTMCNC: 33.2 PG — SIGNIFICANT CHANGE UP (ref 27–34)
MCHC RBC-ENTMCNC: 33.7 GM/DL — SIGNIFICANT CHANGE UP (ref 32–36)
MCHC RBC-ENTMCNC: 33.7 PG — SIGNIFICANT CHANGE UP (ref 27–34)
MCHC RBC-ENTMCNC: 34.4 GM/DL — SIGNIFICANT CHANGE UP (ref 32–36)
MCV RBC AUTO: 98 FL — SIGNIFICANT CHANGE UP (ref 80–100)
MCV RBC AUTO: 98.3 FL — SIGNIFICANT CHANGE UP (ref 80–100)
PLATELET # BLD AUTO: 140 K/UL — LOW (ref 150–400)
PLATELET # BLD AUTO: 192 K/UL — SIGNIFICANT CHANGE UP (ref 150–400)
POTASSIUM SERPL-MCNC: 4.5 MMOL/L — SIGNIFICANT CHANGE UP (ref 3.5–5.3)
POTASSIUM SERPL-MCNC: 4.7 MMOL/L — SIGNIFICANT CHANGE UP (ref 3.5–5.3)
POTASSIUM SERPL-SCNC: 4.5 MMOL/L — SIGNIFICANT CHANGE UP (ref 3.5–5.3)
POTASSIUM SERPL-SCNC: 4.7 MMOL/L — SIGNIFICANT CHANGE UP (ref 3.5–5.3)
PROT SERPL-MCNC: 5.9 G/DL — LOW (ref 6–8.3)
RBC # BLD: 2.76 M/UL — LOW (ref 3.8–5.2)
RBC # BLD: 2.8 M/UL — LOW (ref 3.8–5.2)
RBC # FLD: 12.9 % — SIGNIFICANT CHANGE UP (ref 10.3–14.5)
RBC # FLD: 12.9 % — SIGNIFICANT CHANGE UP (ref 10.3–14.5)
SODIUM SERPL-SCNC: 134 MMOL/L — LOW (ref 135–145)
SODIUM SERPL-SCNC: 136 MMOL/L — SIGNIFICANT CHANGE UP (ref 135–145)
WBC # BLD: 11.2 K/UL — HIGH (ref 3.8–10.5)
WBC # BLD: 8.4 K/UL — SIGNIFICANT CHANGE UP (ref 3.8–10.5)
WBC # FLD AUTO: 11.2 K/UL — HIGH (ref 3.8–10.5)
WBC # FLD AUTO: 8.4 K/UL — SIGNIFICANT CHANGE UP (ref 3.8–10.5)

## 2017-08-20 PROCEDURE — 32557 INSERT CATH PLEURA W/ IMAGE: CPT

## 2017-08-20 PROCEDURE — 71010: CPT | Mod: 26

## 2017-08-20 RX ORDER — FENTANYL CITRATE 50 UG/ML
25 INJECTION INTRAVENOUS ONCE
Qty: 0 | Refills: 0 | Status: DISCONTINUED | OUTPATIENT
Start: 2017-08-20 | End: 2017-08-20

## 2017-08-20 RX ORDER — ALPRAZOLAM 0.25 MG
0.25 TABLET ORAL THREE TIMES A DAY
Qty: 0 | Refills: 0 | Status: DISCONTINUED | OUTPATIENT
Start: 2017-08-20 | End: 2017-08-25

## 2017-08-20 RX ORDER — SODIUM CHLORIDE 9 MG/ML
250 INJECTION INTRAMUSCULAR; INTRAVENOUS; SUBCUTANEOUS ONCE
Qty: 0 | Refills: 0 | Status: COMPLETED | OUTPATIENT
Start: 2017-08-20 | End: 2017-08-20

## 2017-08-20 RX ORDER — ACETAMINOPHEN 500 MG
1000 TABLET ORAL ONCE
Qty: 0 | Refills: 0 | Status: COMPLETED | OUTPATIENT
Start: 2017-08-20 | End: 2017-08-20

## 2017-08-20 RX ADMIN — AMIODARONE HYDROCHLORIDE 400 MILLIGRAM(S): 400 TABLET ORAL at 22:06

## 2017-08-20 RX ADMIN — Medication 0.25 MILLIGRAM(S): at 08:52

## 2017-08-20 RX ADMIN — Medication 100 MILLIGRAM(S): at 06:18

## 2017-08-20 RX ADMIN — Medication 81 MILLIGRAM(S): at 14:54

## 2017-08-20 RX ADMIN — FENTANYL CITRATE 25 MICROGRAM(S): 50 INJECTION INTRAVENOUS at 14:43

## 2017-08-20 RX ADMIN — Medication 1000 MILLIGRAM(S): at 20:38

## 2017-08-20 RX ADMIN — AMIODARONE HYDROCHLORIDE 400 MILLIGRAM(S): 400 TABLET ORAL at 14:55

## 2017-08-20 RX ADMIN — FAMOTIDINE 20 MILLIGRAM(S): 10 INJECTION INTRAVENOUS at 14:54

## 2017-08-20 RX ADMIN — SODIUM CHLORIDE 125 MILLILITER(S): 9 INJECTION INTRAMUSCULAR; INTRAVENOUS; SUBCUTANEOUS at 20:30

## 2017-08-20 RX ADMIN — HEPARIN SODIUM 5000 UNIT(S): 5000 INJECTION INTRAVENOUS; SUBCUTANEOUS at 06:18

## 2017-08-20 RX ADMIN — HYDROMORPHONE HYDROCHLORIDE 2 MILLIGRAM(S): 2 INJECTION INTRAMUSCULAR; INTRAVENOUS; SUBCUTANEOUS at 14:54

## 2017-08-20 RX ADMIN — Medication 100 MILLIGRAM(S): at 22:06

## 2017-08-20 RX ADMIN — HYDROMORPHONE HYDROCHLORIDE 2 MILLIGRAM(S): 2 INJECTION INTRAMUSCULAR; INTRAVENOUS; SUBCUTANEOUS at 19:15

## 2017-08-20 RX ADMIN — HEPARIN SODIUM 5000 UNIT(S): 5000 INJECTION INTRAVENOUS; SUBCUTANEOUS at 18:07

## 2017-08-20 RX ADMIN — Medication 25 MILLIGRAM(S): at 06:18

## 2017-08-20 RX ADMIN — Medication 25 MILLIGRAM(S): at 18:07

## 2017-08-20 RX ADMIN — Medication 0.25 MILLIGRAM(S): at 20:51

## 2017-08-20 RX ADMIN — HYDROMORPHONE HYDROCHLORIDE 2 MILLIGRAM(S): 2 INJECTION INTRAMUSCULAR; INTRAVENOUS; SUBCUTANEOUS at 18:07

## 2017-08-20 RX ADMIN — FENTANYL CITRATE 25 MICROGRAM(S): 50 INJECTION INTRAVENOUS at 16:30

## 2017-08-20 RX ADMIN — AMIODARONE HYDROCHLORIDE 400 MILLIGRAM(S): 400 TABLET ORAL at 06:18

## 2017-08-20 RX ADMIN — Medication 400 MILLIGRAM(S): at 19:31

## 2017-08-20 NOTE — PROGRESS NOTE ADULT - SUBJECTIVE AND OBJECTIVE BOX
Complaining of anxiety, sometimes associated with shortness of breath.  Complaining of difficulty swallowing solid foods where it feels like the food is getting stuck in her throat.  No problems with drinking liquids such as coffee. No significant chest pain.    Review Of Systems:  Constitutional: No Fever, Chills,  No Fatigue, No Weight change   HEENT: No Blurred vision, No Headache   Respiratory: No Cough, No sputum production, No Wheezing, Shortness of breath when has anxiety  Cardiovascular: No Chest Pain, No Palpitations, No Lightheadedness, No Falling, No Syncope, No VILLAFUERTE, No PND, No Orthopnea, No Peripehral Edema  Gastrointestinal: No Abdominal Pain, No Diarrhea, No Constipation, No Nausea, No Vomiting, decreased appetite, difficulty swallowing solid food   Genitourinary: No Dysuria  Extremities: No Swelling, No Claudication,   Neurologic:  No Focal deficit, No Weakness, No Dysphagia, No Paresthesias, No Syncope  Skin: No Rash,  No Ecchymoses, No Wounds, No Tenderness, No Drainage     Medications:  docusate sodium 100 milliGRAM(s) Oral three times a day  aspirin  chewable 81 milliGRAM(s) Oral daily  HYDROmorphone   Tablet 2 milliGRAM(s) Oral every 3 hours PRN  benzocaine 15 mG/menthol 3.6 mG Lozenge 1 Lozenge Oral every 3 hours PRN  ALPRAZolam 0.25 milliGRAM(s) Oral daily PRN  polyethylene glycol 3350 17 Gram(s) Oral daily  amiodarone    Tablet 400 milliGRAM(s) Oral three times a day  famotidine    Tablet 20 milliGRAM(s) Oral daily  metoprolol 25 milliGRAM(s) Oral two times a day  heparin  Injectable 5000 Unit(s) SubCutaneous every 12 hours    PMH/PSH/FH/SH: Unchanged  Vitals:  T(C): 36.7 (08-20-17 @ 05:00), Max: 36.8 (08-19-17 @ 13:46)  HR: 61 (08-20-17 @ 05:00) (61 - 67)  BP: 115/56 (08-20-17 @ 05:00) (112/64 - 119/75)  BP(mean): --  RR: 18 (08-20-17 @ 05:00) (18 - 20)  SpO2: 97% (08-20-17 @ 05:00) (96% - 98%)  Wt(kg): --  Daily     Daily     Physical Exam:  Appearance:  Normal, NAD  Eyes: PERRL, EOMI  HENT:  Normal oral muscosa, NC/AT  Neck: without heptojugular reflux, carotid 2+ equal without bruits  No Thyromegaly  Cardiovascular: normal regular S1, physiologic split S2,  1/6 systolic ejection murmur   Respiratory: Clear to percussion and auscultation bilaterally  Gastrointestinal: Soft, Non-tender, Non-distended, BS+, No masses  Neurologic: Non-focal, No focal neurologic deficits  Skin: No rashes, No ecchymoses, No cyanosis, no peripheral edema  Pulses-no distal pulse in either lower extremity    08-20    136  |  101  |  47<H>  ----------------------------<  111<H>  4.7   |  22  |  1.85<H>    Ca    8.9      20 Aug 2017 06:39  Phos  2.9     08-19  Mg     2.6     08-19 08-19 @ 07:01  -  08-20 @ 07:00  --------------------------------------------------------  IN: 960 mL / OUT: 1302 mL / NET: -342 mL    Cardiac Testing:  Telemetry: sinus rhythm

## 2017-08-20 NOTE — PROGRESS NOTE ADULT - SUBJECTIVE AND OBJECTIVE BOX
Patient is a 83y Female  being evaluated for    CKD3    c/o villafuerte and anxiety  PHYSICAL EXAM:  Vitals:T(C): 36.7 (08-20-17 @ 05:00), Max: 36.8 (08-19-17 @ 13:46)  HR: 61 (08-20-17 @ 05:00) (61 - 67)  BP: 115/56 (08-20-17 @ 05:00) (112/64 - 119/75)  RR: 18 (08-20-17 @ 05:00) (18 - 20)  SpO2: 97% (08-20-17 @ 05:00) (96% - 98%)  Wt(kg): --    General: no acute distress  HEENT:  NC, ext ears nl, oropharynx clear,  nose nl  Neck: No JVD, bruit, adenopathy or thyromegaly  Eyes: PERRL, no discharge, no icterus  Respiratory:dec bs R>L no wheezes, rales, nl effort  Cardiovascular: regular rate, S1 and S2 no rub or gallop  Abd: : BS+, soft, NT , no rebound or guarding, no bruits  Extremities: no edema, no cyanosis, palpable pulses  Neurological: A/O x 3, nl coordination and tone    I and O's:  08-19 @ 07:01  -  08-20 @ 07:00  --------------------------------------------------------  IN: 960 mL / OUT: 1302 mL / NET: -342 mL              REVIEW OF SYSTEMS:  CONSTITUTIONAL: No weakness, fevers or chills  RESPIRATORY: No cough, wheezing, hemoptysis; +VILLAFUERTE  CARDIOVASCULAR: No chest pain or palpitations  GI : no abd pain, nausea or vomiting  GENITOURINARY: No dysuria, frequency or hematuria  All other review of systems is negative unless indicated above.    Allergies    No Known Allergies    Intolerances    epinephrine (Other)        MEDICATIONS  (STANDING):  docusate sodium 100 milliGRAM(s) Oral three times a day  aspirin  chewable 81 milliGRAM(s) Oral daily  polyethylene glycol 3350 17 Gram(s) Oral daily  amiodarone    Tablet 400 milliGRAM(s) Oral three times a day  famotidine    Tablet 20 milliGRAM(s) Oral daily  metoprolol 25 milliGRAM(s) Oral two times a day  heparin  Injectable 5000 Unit(s) SubCutaneous every 12 hours      Sodium,Serum 136    08-20 @ 06:39  Sodium,Serum 138    08-19 @ 03:13  Sodium,Serum 137    08-18 @ 06:21  Sodium,Serum 136    08-17 @ 20:56  Sodium,Serum 137    08-17 @ 14:56  Sodium,Serum 140    08-17 @ 03:08    Potassium,Serum 4.7    08-20 @ 06:39  Potassium,Serum 4.5    08-19 @ 03:13  Potassium,Serum 4.9    08-18 @ 06:21  Potassium,Serum 5.0    08-17 @ 20:56  Potassium,Serum 4.8    08-17 @ 14:56  Potassium,Serum 5.3    08-17 @ 03:08    Chloride,Serum 101    08-20 @ 06:39  Chloride,Serum 101    08-19 @ 03:13  Chloride,Serum 100    08-18 @ 06:21  Chloride,Serum 100    08-17 @ 20:56  Chloride,Serum 99    08-17 @ 14:56  Chloride,Serum 102    08-17 @ 03:08    CO2, Serum 22    08-20 @ 06:39  CO2, Serum 22    08-19 @ 03:13  CO2, Serum 22    08-18 @ 06:21  CO2, Serum 20    08-17 @ 20:56  CO2, Serum 23    08-17 @ 14:56  CO2, Serum 24    08-17 @ 03:08    BUN 47    08-20 @ 06:39  BUN 45    08-19 @ 03:13  BUN 44    08-18 @ 06:21  BUN 44    08-17 @ 20:56  BUN 44    08-17 @ 14:56  BUN 44    08-17 @ 03:08    Creatinine, Serum 1.85    08-20 @ 06:39  Creatinine, Serum 1.76    08-19 @ 03:13  Creatinine, Serum 1.73    08-18 @ 06:21  Creatinine, Serum 1.88    08-17 @ 20:56  Creatinine, Serum 1.75    08-17 @ 14:56  Creatinine, Serum 1.87    08-17 @ 03:08      08-20    136  |  101  |  47<H>  ----------------------------<  111<H>  4.7   |  22  |  1.85<H>    Ca    8.9      20 Aug 2017 06:39  Phos  2.9     08-19  Mg     2.6     08-19                              9.3    8.4   )-----------( 140      ( 20 Aug 2017 06:39 )             27.0         Imp-  83y Female

## 2017-08-20 NOTE — PROGRESS NOTE ADULT - ASSESSMENT
Ms. Aragon is complaining of anxiety and difficulty swallowing solid foods.  No problem with drinking liquids.  No post prandial coughing.  Has shortness of breath with anxiety.  States her anxiety and shortness of breath improved last night with the use of Xanax.   No recurrence of atrial fibrillation on amiodarone.  Examination is unremarkable.  Continue current treatment and xanax as needed for anxiety.  Increase exercise and physical therapy.

## 2017-08-20 NOTE — PROGRESS NOTE ADULT - ASSESSMENT
Imp:  83y Female with CKD 3  baseline creat preop 1.9  s/p AVR  stable renal function  avoid NSAIDS  dose meds for GFR ~25ml/min  monitor I&O's, electrolytes and renal function

## 2017-08-20 NOTE — PROVIDER CONTACT NOTE (OTHER) - RECOMMENDATIONS
PA requested Hep C and HIV draw. PA spoke to Pt and received verbal consent to draw bloods.
Patient placed back to bed, Labs sent, EKG done, Meds given as ordered. NP, RN. And MD at bedside with patient

## 2017-08-20 NOTE — PROGRESS NOTE ADULT - ASSESSMENT
83 year old female with h/o ckd, palpitation's, MVP, migraine headaches, and nonrheumatic aortic valve stenosis, scheduled for aortic valve repair.  8/15/17 s/p AVR t  Post op thrombocytopenia not on dvt prophylaxis  C/o sore throat pain , placed on a dysphagia diet  Transferred to sdu 8/16  Mild improvement in eating, no BM since OR  CXR with small r pneumo, on water seal, will clamp for 3 hours and repeat cxr as per Dr bell- rt ct d/c 8/17   transfer to floor  8/18 rapid afib 180 @ 0530- hypotensive and diaphoretic bp 60's systolic- Dr. Ingram at the bedside; 12 lead ekg performed confirming rapid afib - rt Ej placed- amio 150 iv bolus and magnesium 2 grams ivpb give; ivf 500 cc ns and lalitha ivp given  bp increased to 150/60- pt converted back to rsr 70 @ 0630- amio load initiated and b-blockers d/c secondary to hypotension - Discussed with Dr. Bell- Echo small pericardial effusion, neg tamponade. chest xray: unchanged bilateral pleural effusions  8/19 bursts afib 100-150 - b-blockers resumed lop 25 bid; pt now rsr 70's since [id number]- zofran given for nausea; obtain chest ct r/o pericardial effusion   hep sq resumed plt > 100   discharge planning- home pt next week   Converted to NSR  Ct with bilat pleural effusions,  R>L, no pericardial eff.  For Right pigtail today.

## 2017-08-20 NOTE — PROGRESS NOTE ADULT - SUBJECTIVE AND OBJECTIVE BOX
Im sore and I get SOB when I walk    VITAL SIGNS    Telemetry:  NSR 70    Vital Signs Last 24 Hrs  T(C): 36.9 (17 @ 13:45), Max: 36.9 (17 @ 13:45)  T(F): 98.4 (17 @ 13:45), Max: 98.4 (17 @ 13:45)  HR: 61 (17 @ 13:45) (61 - 61)  BP: 121/72 (17 @ 13:45) (115/56 - 121/72)  RR: 18 (17 @ 13:45) (18 - 20)  SpO2: 97% (17 @ 13:45) (97% - 98%)                    @ 07:01  -   @ 07:00  --------------------------------------------------------  IN: 960 mL / OUT: 1302 mL / NET: -342 mL     @ 07:01  -   @ 14:02  --------------------------------------------------------  IN: 440 mL / OUT: 250 mL / NET: 190 mL          Daily     Daily Weight in k.2 (20 Aug 2017 09:20)        CAPILLARY BLOOD GLUCOSE                  Pacing Wires        [ x ]   Settings:         vvi                         Isolated  [  ]    Coumadin    [ ] YES          [  x]      NO                                     PHYSICAL EXAM        Neurology: alert and oriented x 3, nonfocal, no gross deficits    CV : S1 S2 , RRR     Sternal Wound :  CDI , Stable    Lungs: Diminished RLL    Abdomen: soft, nontender, nondistended, positive bowel sounds,     :           voiding    Extremities:        -edema, negative calve tenderness,   leg incision cdi           docusate sodium 100 milliGRAM(s) Oral three times a day  aspirin  chewable 81 milliGRAM(s) Oral daily  HYDROmorphone   Tablet 2 milliGRAM(s) Oral every 3 hours PRN  benzocaine 15 mG/menthol 3.6 mG Lozenge 1 Lozenge Oral every 3 hours PRN  ALPRAZolam 0.25 milliGRAM(s) Oral daily PRN  polyethylene glycol 3350 17 Gram(s) Oral daily  amiodarone    Tablet 400 milliGRAM(s) Oral three times a day  famotidine    Tablet 20 milliGRAM(s) Oral daily  metoprolol 25 milliGRAM(s) Oral two times a day  heparin  Injectable 5000 Unit(s) SubCutaneous every 12 hours                              Physical Therapy Rec:   Home  [  ]   Home w/ PT  [  ]  Rehab  [  ]    Discussed with Cardiothoracic Team at AM rounds.

## 2017-08-21 LAB
ANION GAP SERPL CALC-SCNC: 16 MMOL/L — SIGNIFICANT CHANGE UP (ref 5–17)
BASOPHILS # BLD AUTO: 0.1 K/UL — SIGNIFICANT CHANGE UP (ref 0–0.2)
BASOPHILS NFR BLD AUTO: 1 % — SIGNIFICANT CHANGE UP (ref 0–2)
BUN SERPL-MCNC: 54 MG/DL — HIGH (ref 7–23)
CALCIUM SERPL-MCNC: 8.6 MG/DL — SIGNIFICANT CHANGE UP (ref 8.4–10.5)
CHLORIDE SERPL-SCNC: 98 MMOL/L — SIGNIFICANT CHANGE UP (ref 96–108)
CO2 SERPL-SCNC: 20 MMOL/L — LOW (ref 22–31)
CREAT SERPL-MCNC: 1.92 MG/DL — HIGH (ref 0.5–1.3)
CRP SERPL-MCNC: 6.4 MG/DL — HIGH (ref 0–0.4)
EOSINOPHIL # BLD AUTO: 0.1 K/UL — SIGNIFICANT CHANGE UP (ref 0–0.5)
EOSINOPHIL NFR BLD AUTO: 0.7 % — SIGNIFICANT CHANGE UP (ref 0–6)
GLUCOSE SERPL-MCNC: 135 MG/DL — HIGH (ref 70–99)
HCT VFR BLD CALC: 28.9 % — LOW (ref 34.5–45)
HGB BLD-MCNC: 9.9 G/DL — LOW (ref 11.5–15.5)
LYMPHOCYTES # BLD AUTO: 0.5 K/UL — LOW (ref 1–3.3)
LYMPHOCYTES # BLD AUTO: 4.9 % — LOW (ref 13–44)
MCHC RBC-ENTMCNC: 33.9 PG — SIGNIFICANT CHANGE UP (ref 27–34)
MCHC RBC-ENTMCNC: 34.3 GM/DL — SIGNIFICANT CHANGE UP (ref 32–36)
MCV RBC AUTO: 98.9 FL — SIGNIFICANT CHANGE UP (ref 80–100)
MONOCYTES # BLD AUTO: 1.1 K/UL — HIGH (ref 0–0.9)
MONOCYTES NFR BLD AUTO: 9.8 % — SIGNIFICANT CHANGE UP (ref 2–14)
NEUTROPHILS # BLD AUTO: 9 K/UL — HIGH (ref 1.8–7.4)
NEUTROPHILS NFR BLD AUTO: 83.5 % — HIGH (ref 43–77)
PLATELET # BLD AUTO: 213 K/UL — SIGNIFICANT CHANGE UP (ref 150–400)
POTASSIUM SERPL-MCNC: 4.7 MMOL/L — SIGNIFICANT CHANGE UP (ref 3.5–5.3)
POTASSIUM SERPL-SCNC: 4.7 MMOL/L — SIGNIFICANT CHANGE UP (ref 3.5–5.3)
RBC # BLD: 2.92 M/UL — LOW (ref 3.8–5.2)
RBC # FLD: 13 % — SIGNIFICANT CHANGE UP (ref 10.3–14.5)
SODIUM SERPL-SCNC: 134 MMOL/L — LOW (ref 135–145)
SURGICAL PATHOLOGY STUDY: SIGNIFICANT CHANGE UP
WBC # BLD: 10.8 K/UL — HIGH (ref 3.8–10.5)
WBC # FLD AUTO: 10.8 K/UL — HIGH (ref 3.8–10.5)

## 2017-08-21 PROCEDURE — 71010: CPT | Mod: 26

## 2017-08-21 RX ORDER — ONDANSETRON 8 MG/1
4 TABLET, FILM COATED ORAL ONCE
Qty: 0 | Refills: 0 | Status: COMPLETED | OUTPATIENT
Start: 2017-08-21 | End: 2017-08-21

## 2017-08-21 RX ORDER — DRONEDARONE 400 MG/1
400 TABLET, FILM COATED ORAL
Qty: 0 | Refills: 0 | Status: DISCONTINUED | OUTPATIENT
Start: 2017-08-21 | End: 2017-08-25

## 2017-08-21 RX ADMIN — HEPARIN SODIUM 5000 UNIT(S): 5000 INJECTION INTRAVENOUS; SUBCUTANEOUS at 06:11

## 2017-08-21 RX ADMIN — Medication 0.25 MILLIGRAM(S): at 01:35

## 2017-08-21 RX ADMIN — Medication 81 MILLIGRAM(S): at 12:01

## 2017-08-21 RX ADMIN — Medication 100 MILLIGRAM(S): at 06:11

## 2017-08-21 RX ADMIN — ONDANSETRON 4 MILLIGRAM(S): 8 TABLET, FILM COATED ORAL at 10:03

## 2017-08-21 RX ADMIN — Medication 0.25 MILLIGRAM(S): at 08:14

## 2017-08-21 RX ADMIN — HEPARIN SODIUM 5000 UNIT(S): 5000 INJECTION INTRAVENOUS; SUBCUTANEOUS at 18:56

## 2017-08-21 RX ADMIN — HYDROMORPHONE HYDROCHLORIDE 2 MILLIGRAM(S): 2 INJECTION INTRAMUSCULAR; INTRAVENOUS; SUBCUTANEOUS at 01:40

## 2017-08-21 RX ADMIN — DRONEDARONE 400 MILLIGRAM(S): 400 TABLET, FILM COATED ORAL at 18:55

## 2017-08-21 RX ADMIN — FAMOTIDINE 20 MILLIGRAM(S): 10 INJECTION INTRAVENOUS at 12:01

## 2017-08-21 RX ADMIN — Medication 100 MILLIGRAM(S): at 15:52

## 2017-08-21 RX ADMIN — Medication 0.25 MILLIGRAM(S): at 22:01

## 2017-08-21 RX ADMIN — HYDROMORPHONE HYDROCHLORIDE 2 MILLIGRAM(S): 2 INJECTION INTRAMUSCULAR; INTRAVENOUS; SUBCUTANEOUS at 02:20

## 2017-08-21 RX ADMIN — POLYETHYLENE GLYCOL 3350 17 GRAM(S): 17 POWDER, FOR SOLUTION ORAL at 12:01

## 2017-08-21 RX ADMIN — Medication 100 MILLIGRAM(S): at 22:01

## 2017-08-21 NOTE — PROGRESS NOTE ADULT - ASSESSMENT
? Post-pericardiotomy syndrome    Hemodynamically stable    Nausea ? due to amiodarone load    Increased LFTs ? amiodarone    PAF now in sinus but paced 60.    Recommend:  Obtain ESR and CRP.  If significantly elevated ? NSAIDs                        Check to see what underlying rhythm is.                        Switch amiodarone to Multaq re: LFTs

## 2017-08-21 NOTE — PROGRESS NOTE ADULT - ASSESSMENT
Imp:  83y Female with CKD 3  baseline creat preop 1.9  s/p AVR  stable renal function  dose meds for GFR ~25ml/min  would avoid NSAIDS with sig impaired GFR

## 2017-08-21 NOTE — PROGRESS NOTE ADULT - SUBJECTIVE AND OBJECTIVE BOX
C/O nausea, fatigue.  Haa right pigtail catheter inserted over weekend with total 1000 hemorrhagic fluid out to date.    Increased alk phos, SGOT and SGPT.    Back in sinus but paced at 60.    REVIEW OF SYSTEMS:  CARDIOVASCULAR: + incisional chest pain  All other review of systems is negative unless indicated above    Medications:  docusate sodium 100 milliGRAM(s) Oral three times a day  aspirin  chewable 81 milliGRAM(s) Oral daily  HYDROmorphone   Tablet 2 milliGRAM(s) Oral every 3 hours PRN  benzocaine 15 mG/menthol 3.6 mG Lozenge 1 Lozenge Oral every 3 hours PRN  polyethylene glycol 3350 17 Gram(s) Oral daily  amiodarone    Tablet 400 milliGRAM(s) Oral three times a day  famotidine    Tablet 20 milliGRAM(s) Oral daily  heparin  Injectable 5000 Unit(s) SubCutaneous every 12 hours  ALPRAZolam 0.25 milliGRAM(s) Oral three times a day PRN      Physical Exam:  Vitals:  T(C): 36.6 (17 @ 05:00), Max: 36.9 (17 @ 13:45)  HR: 60 (17 @ 05:00) (57 - 61)  BP: 110/68 (17 @ 05:00) (92/54 - 121/72)  BP(mean): --  RR: 18 (17 @ 05:00) (16 - 19)  SpO2: 99% (17 @ 05:00) (93% - 99%)  Wt(kg): --  Daily     Daily Weight in k.5 (21 Aug 2017 08:20)  I&O's Summary    20 Aug 2017 07:01  -  21 Aug 2017 07:00  --------------------------------------------------------  IN: 900 mL / OUT: 1450 mL / NET: -550 mL        Appearance:  Normal, NAD  Eyes:  PERRL, EOMI  HEENT: Normal oral muscosa, NC/AT  Neck:  No JVD  Respiratory: Clear to auscultation bilaterally.  Stenum stable  Cardiovascular: Normal S1 and S2 with 1/6 systolic murmur base and LSB  Abdomen:   BS normal, Soft,  Non-tender without organomegally or masses  Extremities: Without edema              Complete Blood Count + Automated Diff (17 @ 05:27)    WBC Count: 10.8 K/uL    RBC Count: 2.92 M/uL    Hemoglobin: 9.9 g/dL    Hematocrit: 28.9 %    Mean Cell Volume: 98.9 fl    Mean Cell Hemoglobin: 33.9 pg    Mean Cell Hemoglobin Conc: 34.3 gm/dL    Red Cell Distrib Width: 13.0 %    Platelet Count - Automated: 213 K/uL    A  Auto Neutrophil %: 83.5: Differential percentages must be correlated with absolute numbers for  clinical significance. %    Auto Lymphocyte %: 4.9 %    Auto Monocyte %: 9.8 %    Auto Eosinophil %: 0.7 %    Auto Basophil %: 1.0 %        134<L>  |  98  |  54<H>  ----------------------------<  135<H>  4.7   |  20<L>  |  1.92<H>    Ca    8.6      21 Aug 2017 05:27  Mg     2.6         TPro  5.9<L>  /  Alb  3.5  /  TBili  0.8  /  DBili  x   /  AST  75<H>  /  ALT  76<H>  /  AlkPhos  210<H>

## 2017-08-21 NOTE — PROGRESS NOTE ADULT - ASSESSMENT
83 year old female with h/o ckd, palpitation's, MVP, migraine headaches, and nonrheumatic aortic valve stenosis, scheduled for aortic valve repair.  8/15/17 s/p AVR t  Post op thrombocytopenia not on dvt prophylaxis  C/o sore throat pain , placed on a dysphagia diet  Transferred to sdu 8/16  Mild improvement in eating, no BM since OR  CXR with small r pneumo, on water seal, will clamp for 3 hours and repeat cxr as per Dr bell- rt ct d/c 8/17   transfer to floor  8/18 rapid afib 180 @ 0530- hypotensive and diaphoretic bp 60's systolic- Dr. Ingram at the bedside; 12 lead ekg performed confirming rapid afib - rt Ej placed- amio 150 iv bolus and magnesium 2 grams ivpb give; ivf 500 cc ns and lalitha ivp given  bp increased to 150/60- pt converted back to rsr 70 @ 0630- amio load initiated and b-blockers d/c secondary to hypotension - Discussed with Dr. Blel- Echo small pericardial effusion, neg tamponade. chest xray: unchanged bilateral pleural effusions  8/19 bursts afib 100-150 - b-blockers resumed lop 25 bid; pt now rsr 70's since [id number]- zofran given for nausea; obtain chest ct r/o pericardial effusion   hep sq resumed plt > 100   discharge planning- home pt next week   Converted to NSR  Ct with bilat pleural effusions,  R>L, no pericardial eff.  For Right pigtail today.  8/21 Right pigtail 130/1000 pt nauseas this am Zofran given amiodarone d/c d/t transaminitis  Multaq 400 bid per cardiology Dr Perez CRP sent

## 2017-08-21 NOTE — PROGRESS NOTE ADULT - SUBJECTIVE AND OBJECTIVE BOX
Patient is a 83y Female  being evaluated for CKD 3  c/o fatigue  Voiding without difficulty. No dysuria, hematuria, urgency.          PHYSICAL EXAM:  Vitals:T(C): 36.6 (08-21-17 @ 05:00), Max: 36.9 (08-20-17 @ 13:45)  HR: 60 (08-21-17 @ 05:00) (57 - 61)  BP: 110/68 (08-21-17 @ 05:00) (92/54 - 121/72)  RR: 18 (08-21-17 @ 05:00) (16 - 19)  SpO2: 99% (08-21-17 @ 05:00) (93% - 99%)  Wt(kg): --    General: no acute distress  HEENT:  NC, ext ears nl, oropharynx clear,  nose nl  Neck: No JVD, bruit, adenopathy or thyromegaly  Eyes: PERRL, no discharge, no icterus  Respiratory: dec bs bases, no wheezes, rales, nl effort  Cardiovascular: regular rate, S1 and S2 no rub or gallop  Abd: : BS+, soft, NT , no rebound or guarding, no bruits  Extremities: tr edema, no cyanosis, palpable pulses    I and O's:  08-20 @ 07:01  -  08-21 @ 07:00  --------------------------------------------------------  IN: 900 mL / OUT: 1450 mL / NET: -550 mL              REVIEW OF SYSTEMS:  CONSTITUTIONAL: +fatigue, no, fevers or chills  RESPIRATORY: No cough, wheezing, hemoptysis; +VILLAFUERTE  CARDIOVASCULAR: No chest pain or palpitations  GI : no abd pain, nausea or vomiting  GENITOURINARY: No dysuria, frequency or hematuria  All other review of systems is negative unless indicated above.    Allergies    No Known Allergies    Intolerances    epinephrine (Other)        MEDICATIONS  (STANDING):  docusate sodium 100 milliGRAM(s) Oral three times a day  aspirin  chewable 81 milliGRAM(s) Oral daily  polyethylene glycol 3350 17 Gram(s) Oral daily  famotidine    Tablet 20 milliGRAM(s) Oral daily  heparin  Injectable 5000 Unit(s) SubCutaneous every 12 hours  dronedarone 400 milliGRAM(s) Oral two times a day      Sodium,Serum 134    08-21 @ 05:27  Sodium,Serum 134    08-20 @ 22:08  Sodium,Serum 136    08-20 @ 06:39  Sodium,Serum 138    08-19 @ 03:13  Sodium,Serum 137    08-18 @ 06:21  Sodium,Serum 136    08-17 @ 20:56  Sodium,Serum 137    08-17 @ 14:56    Potassium,Serum 4.7    08-21 @ 05:27  Potassium,Serum 4.5    08-20 @ 22:08  Potassium,Serum 4.7    08-20 @ 06:39  Potassium,Serum 4.5    08-19 @ 03:13  Potassium,Serum 4.9    08-18 @ 06:21  Potassium,Serum 5.0    08-17 @ 20:56  Potassium,Serum 4.8    08-17 @ 14:56    Chloride,Serum 98    08-21 @ 05:27  Chloride,Serum 99    08-20 @ 22:08  Chloride,Serum 101    08-20 @ 06:39  Chloride,Serum 101    08-19 @ 03:13  Chloride,Serum 100    08-18 @ 06:21  Chloride,Serum 100    08-17 @ 20:56  Chloride,Serum 99    08-17 @ 14:56    CO2, Serum 20    08-21 @ 05:27  CO2, Serum 19    08-20 @ 22:08  CO2, Serum 22    08-20 @ 06:39  CO2, Serum 22    08-19 @ 03:13  CO2, Serum 22    08-18 @ 06:21  CO2, Serum 20    08-17 @ 20:56  CO2, Serum 23    08-17 @ 14:56    BUN 54    08-21 @ 05:27  BUN 52    08-20 @ 22:08  BUN 47    08-20 @ 06:39  BUN 45    08-19 @ 03:13  BUN 44    08-18 @ 06:21  BUN 44    08-17 @ 20:56  BUN 44    08-17 @ 14:56    Creatinine, Serum 1.92    08-21 @ 05:27  Creatinine, Serum 1.90    08-20 @ 22:08  Creatinine, Serum 1.85    08-20 @ 06:39  Creatinine, Serum 1.76    08-19 @ 03:13  Creatinine, Serum 1.73    08-18 @ 06:21  Creatinine, Serum 1.88    08-17 @ 20:56  Creatinine, Serum 1.75    08-17 @ 14:56      08-21    134<L>  |  98  |  54<H>  ----------------------------<  135<H>  4.7   |  20<L>  |  1.92<H>    Ca    8.6      21 Aug 2017 05:27  Mg     2.6     08-20    TPro  5.9<L>  /  Alb  3.5  /  TBili  0.8  /  DBili  x   /  AST  75<H>  /  ALT  76<H>  /  AlkPhos  210<H>  08-20                            9.9    10.8  )-----------( 213      ( 21 Aug 2017 05:27 )             28.9

## 2017-08-21 NOTE — PROGRESS NOTE ADULT - PROBLEM SELECTOR PLAN 5
pt converted to rsr- bp stable    resume b-blockers today  ck electrolytes  keep pw attached and on  amio d/c r/t transaminitis   multaq 400 bid 8/21

## 2017-08-21 NOTE — PROGRESS NOTE ADULT - SUBJECTIVE AND OBJECTIVE BOX
Subjective  hello OOB in chaier no acute distress    VITAL SIGNS    Telemetry:  SR with  Vpacing  Vital Signs Last 24 Hrs  T(C): 36.6 (17 @ 05:00), Max: 36.9 (17 @ 13:45)  T(F): 97.8 (17 @ 05:00), Max: 98.4 (17 @ 13:45)  HR: 60 (17 @ 05:00) (57 - 61)  BP: 110/68 (17 @ 05:00) (92/54 - 121/72)  RR: 18 (17 @ 05:00) (16 - 19)  SpO2: 99% (17 @ 05:00) (93% - 99%)          @ 07:01  -   @ 07:00  --------------------------------------------------------  IN: 900 mL / OUT: 1450 mL / NET: -550 mL       Daily Weight in k.5 (21 Aug 2017 08:20)  CAPILLARY BLOOD GLUCOSE  165 (20 Aug 2017 21:18)  Drains:  R Pleural  [x  ]  Drainage:130/1000    Pacing Wires   x2 VVI 60         PHYSICAL EXAM  Neurology: alert and oriented x 3, nonfocal, no gross deficits  CV :RRR S1S2  Sternal Wound :  QASIM Stable wires x2 -EPM VVI 60   Lungs: Right pleural pigtail 130/1000 B/L breath sounds  Abdomen: soft, nontender, nondistended, positive bowel sounds, last bowel movement    :  voids          Extremities: B/L +DP terace edema edema            Physical Therapy Rec:   Home w/ PT  [ x]    Discussed with Cardiothoracic Team at AM rounds.

## 2017-08-22 ENCOUNTER — TRANSCRIPTION ENCOUNTER (OUTPATIENT)
Age: 82
End: 2017-08-22

## 2017-08-22 LAB
ANION GAP SERPL CALC-SCNC: 15 MMOL/L — SIGNIFICANT CHANGE UP (ref 5–17)
BUN SERPL-MCNC: 52 MG/DL — HIGH (ref 7–23)
CALCIUM SERPL-MCNC: 8.3 MG/DL — LOW (ref 8.4–10.5)
CHLORIDE SERPL-SCNC: 97 MMOL/L — SIGNIFICANT CHANGE UP (ref 96–108)
CO2 SERPL-SCNC: 21 MMOL/L — LOW (ref 22–31)
CREAT SERPL-MCNC: 1.89 MG/DL — HIGH (ref 0.5–1.3)
ERYTHROCYTE [SEDIMENTATION RATE] IN BLOOD: 40 MM/HR — HIGH (ref 0–20)
GLUCOSE SERPL-MCNC: 106 MG/DL — HIGH (ref 70–99)
HCT VFR BLD CALC: 28.5 % — LOW (ref 34.5–45)
HGB BLD-MCNC: 9.9 G/DL — LOW (ref 11.5–15.5)
MCHC RBC-ENTMCNC: 34.3 PG — HIGH (ref 27–34)
MCHC RBC-ENTMCNC: 34.8 GM/DL — SIGNIFICANT CHANGE UP (ref 32–36)
MCV RBC AUTO: 98.6 FL — SIGNIFICANT CHANGE UP (ref 80–100)
PLATELET # BLD AUTO: 229 K/UL — SIGNIFICANT CHANGE UP (ref 150–400)
POTASSIUM SERPL-MCNC: 4.5 MMOL/L — SIGNIFICANT CHANGE UP (ref 3.5–5.3)
POTASSIUM SERPL-SCNC: 4.5 MMOL/L — SIGNIFICANT CHANGE UP (ref 3.5–5.3)
RBC # BLD: 2.89 M/UL — LOW (ref 3.8–5.2)
RBC # FLD: 13.7 % — SIGNIFICANT CHANGE UP (ref 10.3–14.5)
SODIUM SERPL-SCNC: 133 MMOL/L — LOW (ref 135–145)
WBC # BLD: 11 K/UL — HIGH (ref 3.8–10.5)
WBC # FLD AUTO: 11 K/UL — HIGH (ref 3.8–10.5)

## 2017-08-22 PROCEDURE — 71010: CPT | Mod: 26,76

## 2017-08-22 RX ADMIN — Medication 100 MILLIGRAM(S): at 06:03

## 2017-08-22 RX ADMIN — HYDROMORPHONE HYDROCHLORIDE 2 MILLIGRAM(S): 2 INJECTION INTRAMUSCULAR; INTRAVENOUS; SUBCUTANEOUS at 20:20

## 2017-08-22 RX ADMIN — FAMOTIDINE 20 MILLIGRAM(S): 10 INJECTION INTRAVENOUS at 11:57

## 2017-08-22 RX ADMIN — HYDROMORPHONE HYDROCHLORIDE 2 MILLIGRAM(S): 2 INJECTION INTRAMUSCULAR; INTRAVENOUS; SUBCUTANEOUS at 16:24

## 2017-08-22 RX ADMIN — HYDROMORPHONE HYDROCHLORIDE 2 MILLIGRAM(S): 2 INJECTION INTRAMUSCULAR; INTRAVENOUS; SUBCUTANEOUS at 19:49

## 2017-08-22 RX ADMIN — HEPARIN SODIUM 5000 UNIT(S): 5000 INJECTION INTRAVENOUS; SUBCUTANEOUS at 18:00

## 2017-08-22 RX ADMIN — DRONEDARONE 400 MILLIGRAM(S): 400 TABLET, FILM COATED ORAL at 18:00

## 2017-08-22 RX ADMIN — Medication 0.25 MILLIGRAM(S): at 06:02

## 2017-08-22 RX ADMIN — HYDROMORPHONE HYDROCHLORIDE 2 MILLIGRAM(S): 2 INJECTION INTRAMUSCULAR; INTRAVENOUS; SUBCUTANEOUS at 15:46

## 2017-08-22 RX ADMIN — HEPARIN SODIUM 5000 UNIT(S): 5000 INJECTION INTRAVENOUS; SUBCUTANEOUS at 06:03

## 2017-08-22 RX ADMIN — DRONEDARONE 400 MILLIGRAM(S): 400 TABLET, FILM COATED ORAL at 06:03

## 2017-08-22 RX ADMIN — Medication 81 MILLIGRAM(S): at 11:57

## 2017-08-22 NOTE — PROGRESS NOTE ADULT - ASSESSMENT
Stable in sinus rhythm with rates 70s.    Pleural effusion ? PCCS.    Plan: CXR and potential to D/C CT today.  WIll need to f/u effusion when outpatient.    Hold off on NSAIDs for now given CKD.    Continue current meds. Stable in sinus rhythm with rates 70s.    Pleural effusion ? PCCS.    Plan: CXR and potential to D/C CT today.  WIll need to f/u effusion when outpatient.    Hold off on NSAIDs for now given CKD.    would place back on outpatient dose of Metoprolol 12.5 mg bid which she was using for migraine and frequent APCs.    Continue other current meds

## 2017-08-22 NOTE — DISCHARGE NOTE ADULT - OTHER SIGNIFICANT FINDINGS
Neurology: A&Ox3, nonfocal, no gross deficits  CV : RRR+S1S2  Sternal Wound: MSI CDI QASIM, Stable  Lungs: Respirations non-labored, B/L BS clear, diminished at bases  Abdomen: Soft, NT/ND, +BSx4Q, last BM 8/24 (-)N/V/D  : Voiding without difficulty  Extremities: B/L LE trace edema, negative calf tenderness, +PP

## 2017-08-22 NOTE — DISCHARGE NOTE ADULT - ADDITIONAL INSTRUCTIONS
Follow up with Dr. rasmussen at CTS office at [facility], call [phone] to confirm appointment.   Please schedule an appointment with your Cardiologist in 1-2 weeks, please call the office to schedule an appointment. Follow up with Dr. Palacio after discharge from Rehab at [facility], call [phone] to schedule appointment.   Please schedule an appointment with your Cardiologist, Dr. Perez after discharge from Rehab, please call the office to schedule an appointment.  Please schedule an appointment with your nephrologist Dr. Everett after discharge from Rehab, call the office to schedule an appointment.  Check CXR on 8/30/17 and Check Sed rate/CRP on 8/31/17 with labs.

## 2017-08-22 NOTE — DISCHARGE NOTE ADULT - PATIENT PORTAL LINK FT
“You can access the [employer] Patient Portal, offered by [facility], by registering with the following website: [email or web address]”

## 2017-08-22 NOTE — DISCHARGE NOTE ADULT - CARE PROVIDER_API CALL
Cameron Palacio), Thoracic and Cardiac Surgery  [address] [place] [address]  Phone: [phone]  Fax: [phone]    Tucker Perez), Cardiovascular Disease; Internal Medicine  [address]  [address]  [place] [address]  Phone: [phone]  Fax: [phone]    Rafael Everett), Internal Medicine; Nephrology  [address]  [place] [address]  Phone: [phone]  Fax: [phone]

## 2017-08-22 NOTE — DISCHARGE NOTE ADULT - PLAN OF CARE
Complete Recovery 1. Daily Shower  2. Weight yourself daily and notify any weight gain greater than 2-3 pounds in 24 hours.  3. Regular diet - low fat, low cholesterol, no added salt.  4. Cleanse Midsternal incision daily while showering with warm water and mild soap, pat dry and maintain open to air.   5. Follow Cardiac Surgery Do's and Don'ts discharge instructions.   6. Check BMP, CBC every Monday and Thursday and prn.   7. Check Sed rate/CRP on 9/1/17.  8. Check CXR on 8/30/17. 1. Daily Shower  2. Weight yourself daily and notify any weight gain greater than 2-3 pounds in 24 hours.  3. Soft diet - low fat, low cholesterol, no added salt. Ensure cans BID. Advance diet as tolerated.  4. Cleanse Midsternal incision daily while showering with warm water and mild soap, pat dry and maintain open to air.   5. Follow Cardiac Surgery Do's and Don'ts discharge instructions.   6. Check BMP, CBC every Monday and Thursday and prn.   7. Check Sed rate/CRP on 9/1/17.  8. Check CXR on 8/30/17.

## 2017-08-22 NOTE — DIETITIAN INITIAL EVALUATION ADULT. - ORAL INTAKE PTA
good/Pt reports good po intake PTA; consumed 3 meals/day with snacks. Pt dislikes chocolate as it triggers Migraines in patient. Pt denies taking vitamin/mineral supplements PTA.

## 2017-08-22 NOTE — DISCHARGE NOTE ADULT - INSTRUCTIONS
Regular diet - low fat, low cholesterol, no added salt. Soft diet with thin liquids- low fat, low cholesterol, no added salt. Ensure pudding cans BID. Advance diet as tolerated.

## 2017-08-22 NOTE — PROGRESS NOTE ADULT - PROBLEM SELECTOR PLAN 5
pt converted to rsr- bp stable    resume b-blockers today  ck electrolytes  amio d/c r/t transaminitis   multaq 400 bid 8/21

## 2017-08-22 NOTE — DIETITIAN INITIAL EVALUATION ADULT. - ENERGY NEEDS
ht: 63 inches. wt: 123.4 pounds (current, standing, +1 generalized edema, +2 B/L ankle, leg edema). BMI: 21.9 kG/m2. UBW: 125 pounds. IBW: 115 pounds +/-10%. %IBW: 107%  Other pertinent objective information: 83 year old female pt with PMH CKD, palpitation's, MVP, migraine headaches, and nonrheumatic aortic valve stenosis, scheduled for aortic valve repair 8/15/17 s/p AVR. No pressure ulcers.

## 2017-08-22 NOTE — PROGRESS NOTE ADULT - SUBJECTIVE AND OBJECTIVE BOX
C/O fatigue and dyspea walking.  No chest pain.  Remain s in NSR.  Nausea gone off amiodarone.  Only 50 cc out from CT past 24 hours.    CRP 6.4.    REVIEW OF SYSTEMS:  CARDIOVASCULAR: No chest pain or palpitations  All other review of systems is negative unless indicated above    Medications:  docusate sodium 100 milliGRAM(s) Oral three times a day  aspirin  chewable 81 milliGRAM(s) Oral daily  HYDROmorphone   Tablet 2 milliGRAM(s) Oral every 3 hours PRN  benzocaine 15 mG/menthol 3.6 mG Lozenge 1 Lozenge Oral every 3 hours PRN  polyethylene glycol 3350 17 Gram(s) Oral daily  famotidine    Tablet 20 milliGRAM(s) Oral daily  heparin  Injectable 5000 Unit(s) SubCutaneous every 12 hours  ALPRAZolam 0.25 milliGRAM(s) Oral three times a day PRN  dronedarone 400 milliGRAM(s) Oral two times a day      Physical Exam:  Vitals:  T(C): 36.6 (17 @ 05:00), Max: 37 (17 @ 20:33)  HR: 71 (17 @ 05:00) (67 - 88)  BP: 122/73 (17 @ 05:00) (113/64 - 122/73)  BP(mean): --  RR: 18 (17 @ 05:00) (18 - 18)  SpO2: 95% (17 @ 05:00) (94% - 96%)  Wt(kg): --  Daily     Daily Weight in k (22 Aug 2017 08:30)  I&O's Summary    21 Aug 2017 07:01  -  22 Aug 2017 07:00  --------------------------------------------------------  IN: 520 mL / OUT: 2800 mL / NET: -2280 mL        Appearance:  Normal, NAD  Eyes:  PERRL, EOMI  HEENT: Normal oral mus[place]a, NC/AT  Neck:  No JVD or HJR  Respiratory: Clear to auscultation bilaterally  Cardiovascular: Normal S1 and S2 without murmurs, rubs or gallops  Abdomen:   BS normal, Soft,  Non-tender without organomegally or masses  Extremities: Without edema, pulses are full              Complete Blood Count (17 @ 06:09)    WBC Count: 11.0 K/uL    RBC Count: 2.89 M/uL    Hemoglobin: 9.9 g/dL    Hematocrit: 28.5 %    Mean Cell Volume: 98.6 fl    Mean Cell Hemoglobin: 34.3 pg    Mean Cell Hemoglobin Conc: 34.8 gm/dL    Red Cell Distrib Width: 13.7 %    Platelet Count - Automated: 229 K/uL        133<L>  |  97  |  52<H>  ----------------------------<  106<H>  4.5   |  21<L>  |  1.89<H>    Ca    8.3<L>      22 Aug 2017 06:09  Mg     2.6         TPro  5.9<L>  /  Alb  3.5  /  TBili  0.8  /  DBili  x   /  AST  75<H>  /  ALT  76<H>  /  AlkPhos  210<H>

## 2017-08-22 NOTE — DISCHARGE NOTE ADULT - CARE PLAN
Principal Discharge DX:	S/P AVR  Goal:	Complete Recovery  Instructions for follow-up, activity and diet:	1. Daily Shower  2. Weight yourself daily and notify any weight gain greater than 2-3 pounds in 24 hours.  3. Regular diet - low fat, low cholesterol, no added salt.  4. Cleanse Midsternal incision daily while showering with warm water and mild soap, pat dry and maintain open to air.   5. Follow Cardiac Surgery Do's and Don'ts discharge instructions.   6. Check BMP, CBC every Monday and Thursday and prn.   7. Check Sed rate/CRP on 9/1/17.  8. Check CXR on 8/30/17. Principal Discharge DX:	S/P AVR  Goal:	Complete Recovery  Instructions for follow-up, activity and diet:	1. Daily Shower  2. Weight yourself daily and notify any weight gain greater than 2-3 pounds in 24 hours.  3. Soft diet - low fat, low cholesterol, no added salt. Ensure cans BID. Advance diet as tolerated.  4. Cleanse Midsternal incision daily while showering with warm water and mild soap, pat dry and maintain open to air.   5. Follow Cardiac Surgery Do's and Don'ts discharge instructions.   6. Check BMP, CBC every Monday and Thursday and prn.   7. Check Sed rate/CRP on 9/1/17.  8. Check CXR on 8/30/17.

## 2017-08-22 NOTE — DISCHARGE NOTE ADULT - CARE PROVIDERS DIRECT ADDRESSES
[email or web address],DirectAddress_Unknown,DirectAddress_Unknown

## 2017-08-22 NOTE — DIETITIAN INITIAL EVALUATION ADULT. - NS AS NUTRI INTERV MEALS SNACK
General/healthful diet/Texture-modified diet/1) Continue soft general healthful diet for ease of swallowing and to promote heart health. Informed pt to try and increase protein/energy intake to promote wound healing post-op.

## 2017-08-22 NOTE — DISCHARGE NOTE ADULT - HOSPITAL COURSE
82 y/o female with h/o ckd, palpitation's, MVP, migraine headaches, and nonrheumatic aortic valve stenosis, scheduled for aortic valve repair.   s/p 8/15/17 AVR tissue  Post-op Course:   -thrombocytopenia not on dvt prophylaxis  C/o sore throat pain , placed on a dysphagia diet  Transferred to sdu 8/16  Mild improvement in eating, no BM since OR  CXR with small r pneumo, on water seal, will clamp for 3 hours and repeat cxr as per Dr palacio- rt ct d/c 8/17   transfer to floor  8/18 rapid afib 180 @ 0530- hypotensive and diaphoretic bp 60's systolic- Dr. Ingram at the bedside; 12 lead ekg performed confirming rapid afib - rt Ej placed- amio 150 iv bolus and magnesium 2 grams ivpb give; ivf 500 cc ns and lalitha ivp given  bp increased to 150/60- pt converted back to rsr 70 @ 0630- amio load initiated and b-blockers d/c secondary to hypotension - Discussed with Dr. Palacio- Echo small pericardial effusion, neg tamponade. chest xray: unchanged bilateral pleural effusions  8/19 bursts afib 100-150 - b-blockers resumed lop 25 bid; pt now rsr 70's since [id number]- zofran given for nausea; obtain chest ct r/o pericardial effusion   hep sq resumed plt > 100. Converted to NSR  CT Chest with bilat pleural effusions,  R>L, no pericardial eff. For Right pigtail today.  8/21 Right pigtail 130/1000 pt nauseas this am Zofran given amiodarone d/c d/t transaminitis  Multaq 400 bid per cardiology Dr Perez CRP sent  8/22 CRP 6.10 loose liquid stool this am laxatives d/c Right pigtail D/C pacing d/c patient requesting Rehab d/w Dr Palacio and care coordinators  8/23 nausea after dilaudid.  dilaudid dc'd.  Plan is for home not rehab.  Lopressor added for migraine prophylaxis per cardiologist.  FU CXR am 8/24 8/24 aflutter overnight.  110-156.  Lopressor increased.    8/25 Discharged to [facility] per Dr. Palacio.

## 2017-08-22 NOTE — PROGRESS NOTE ADULT - SUBJECTIVE AND OBJECTIVE BOX
Subjective  Feels better today-CT out      PHYSICAL EXAM:  Vitals:  T(F): 98.2 (08-22-17 @ 12:21), Max: 98.6 (08-21-17 @ 20:33)  HR: 101 (08-22-17 @ 12:21)  BP: 110/64 (08-22-17 @ 12:21)  BP(mean): --  RR: 17 (08-22-17 @ 12:21)  SpO2: 94% (08-22-17 @ 12:21)  Wt(kg): --  Constitutional: no acute distress  HEENT:  NC, ext ears nl, oropharynx clear,  nose nl  Neck: No JVD, bruit, adenopathy or thyromegaly  Eyes: PERRL, no discharge, no icterus  Respiratory: cta/p bilat, no wheezes, rales, nl effort  Cardiovascular: regular rate, S1 and S2 no rub or gallop-fresh sternotomy  Abd: : BS+, soft, NT , no rebound or guarding, no bruits  Extremities: no edema or cyanosis, palpable pulses  Neurological: A/O x 3, nl coordination and tone    I and O's:    08-21 @ 07:01  -  08-22 @ 07:00  --------------------------------------------------------  IN: 520 mL / OUT: 2800 mL / NET: -2280 mL    08-22 @ 07:01  -  08-22 @ 12:43  --------------------------------------------------------  IN: 480 mL / OUT: 2 mL / NET: 478 mL            REVIEW OF SYSTEMS:  CONSTITUTIONAL: No weakness, fevers or chills  RESPIRATORY: No cough, wheezing, hemoptysis; No shortness of breath  CARDIOVASCULAR: No chest pain or palpitations  GI : no abd pains, nausea or vomiting  GENITOURINARY: No dysuria, frequency or hematuria  All other review of systems is negative unless indicated above.    Allergies    No Known Allergies    Intolerances    epinephrine (Other)      MEDICATIONS  (STANDING):  aspirin  chewable 81 milliGRAM(s) Oral daily  famotidine    Tablet 20 milliGRAM(s) Oral daily  heparin  Injectable 5000 Unit(s) SubCutaneous every 12 hours  dronedarone 400 milliGRAM(s) Oral two times a day      Sodium,Serum 133    08-22 @ 06:09  Sodium,Serum 134    08-21 @ 05:27  Sodium,Serum 134    08-20 @ 22:08  Sodium,Serum 136    08-20 @ 06:39  Sodium,Serum 138    08-19 @ 03:13    Potassium,Serum 4.5    08-22 @ 06:09  Potassium,Serum 4.7    08-21 @ 05:27  Potassium,Serum 4.5    08-20 @ 22:08  Potassium,Serum 4.7    08-20 @ 06:39  Potassium,Serum 4.5    08-19 @ 03:13    Chloride,Serum 97    08-22 @ 06:09  Chloride,Serum 98    08-21 @ 05:27  Chloride,Serum 99    08-20 @ 22:08  Chloride,Serum 101    08-20 @ 06:39  Chloride,Serum 101    08-19 @ 03:13    CO2, Serum 21    08-22 @ 06:09  CO2, Serum 20    08-21 @ 05:27  CO2, Serum 19    08-20 @ 22:08  CO2, Serum 22    08-20 @ 06:39  CO2, Serum 22    08-19 @ 03:13    BUN 52    08-22 @ 06:09  BUN 54    08-21 @ 05:27  BUN 52    08-20 @ 22:08  BUN 47    08-20 @ 06:39  BUN 45    08-19 @ 03:13    Creatinine, Serum 1.89    08-22 @ 06:09  Creatinine, Serum 1.92    08-21 @ 05:27  Creatinine, Serum 1.90    08-20 @ 22:08  Creatinine, Serum 1.85    08-20 @ 06:39  Creatinine, Serum 1.76    08-19 @ 03:13      08-22    133<L>  |  97  |  52<H>  ----------------------------<  106<H>  4.5   |  21<L>  |  1.89<H>    Ca    8.3<L>      22 Aug 2017 06:09  Mg     2.6     08-20    TPro  5.9<L>  /  Alb  3.5  /  TBili  0.8  /  DBili  x   /  AST  75<H>  /  ALT  76<H>  /  AlkPhos  210<H>  08-20      Urine Studies:      Urine chemistry:   Urine Na:   Urine Creatinine:   Urine Protein/Cr ratio:  Urine K:   Urine Osm:   24 Hr urine studies:

## 2017-08-22 NOTE — DISCHARGE NOTE ADULT - NS AS ACTIVITY OBS
Walking-Outdoors allowed/No Heavy lifting/straining/Showering allowed/Walking-Indoors allowed/Stairs allowed/Return to Work/School allowed/Do not drive or operate machinery

## 2017-08-22 NOTE — PROGRESS NOTE ADULT - ASSESSMENT
Imp:  83y Female with CKD 3  baseline creat preop 1.9  s/p AVR-CT removed this AM  stable renal function  dose meds for GFR ~25ml/min  would avoid NSAIDS with sig impaired GFR  Plan as per CTS

## 2017-08-22 NOTE — DISCHARGE NOTE ADULT - MEDICATION SUMMARY - MEDICATIONS TO CHANGE
I will SWITCH the dose or number of times a day I take the medications listed below when I get home from the hospital:    metoprolol  -- 12.5 milligram(s) by mouth every 12 hours

## 2017-08-22 NOTE — PROGRESS NOTE ADULT - SUBJECTIVE AND OBJECTIVE BOX
Subjective  hello no acute distress no focal deficits    VITAL SIGNS    Telemetry: NSR/SB 50-60     Vital Signs Last 24 Hrs  T(C): 36.8 (17 @ 12:21), Max: 37 (17 @ 20:33)  T(F): 98.2 (17 @ 12:21), Max: 98.6 (17 @ 20:33)  HR: 101 (17 @ 12:21) (67 - 101)  BP: 110/64 (17 @ 12:21) (110/64 - 122/73)  RR: 17 (17 @ 12:21) (17 - 18)  SpO2: 94% (17 @ 12:21) (94% - 96%)            @ 07:01  -   @ 07:00  --------------------------------------------------------  IN: 520 mL / OUT: 2800 mL / NET: -2280 mL     @ 07:01  -   @ 12:28  --------------------------------------------------------  IN: 480 mL / OUT: 2 mL / NET: 478 mL  Daily Weight in k (22 Aug 2017 08:30)    Drains:     MS       R Pleural  [ x ]  Drainage:0/50  Pacing Wires x4                     PHYSICAL EXAM  Neurology: alert and oriented x 3, nonfocal, no gross deficits  CV :RRR S1 S2  Sternal Wound :QASIM stable eccymotic  no drainage  Lungs: CTA right pleural [pigtail inplace  Abdomen: soft, nontender, nondistended, positive bowel sounds, last bowel movement  diarrhea  : voiding            Extremities: B/L + DP no edema no calf tenderness'      Physical Therapy Rec:Home w/ PT  [ x ]     Discussed with Cardiothoracic Team at AM rounds.

## 2017-08-22 NOTE — DISCHARGE NOTE ADULT - HOME CARE AGENCY
[facility]  [phone]. Visiting nurse will call 1-2 days after discharge to arrange visit. Please call agency with any questions or concerns

## 2017-08-22 NOTE — DIETITIAN INITIAL EVALUATION ADULT. - OTHER INFO
Pt seen for LOS. Pt reports greatly improved po intake/appetite since surgery, also improved swallowing originally c/o difficulty swallowing now per pt nearly resolved. pt denies current N/V/constipation/diarrhea at this time. Pt eating ~50% of meals, also likes Ensure Clear supplements, dislikes Ensure pudding and requests to try Ensure Enlive instead. Pt ameanble to try Strawberry Ensure Enlive and apple or berry Ensure Clear. Pt follows a general healthful diet while at home and denies need for diet education at this time.

## 2017-08-22 NOTE — DIETITIAN INITIAL EVALUATION ADULT. - NS AS NUTRI INTERV MEDICAL AND FOOD SUPPLEMENTS
1) Continue Ensure Clear apple or berry 1x day for additional 180 calories, 8 grams protein daily, add Ensure Enlive 2 x day strawberry flavor for additional 700 calories, 40 grams protein daily. Pt dislikes Ensure pudding, will remove.

## 2017-08-22 NOTE — PROGRESS NOTE ADULT - ASSESSMENT
83 year old female with h/o ckd, palpitation's, MVP, migraine headaches, and nonrheumatic aortic valve stenosis, scheduled for aortic valve repair.  8/15/17 s/p AVR t  Post op thrombocytopenia not on dvt prophylaxis  C/o sore throat pain , placed on a dysphagia diet  Transferred to sdu 8/16  Mild improvement in eating, no BM since OR  CXR with small r pneumo, on water seal, will clamp for 3 hours and repeat cxr as per Dr bell- rt ct d/c 8/17   transfer to floor  8/18 rapid afib 180 @ 0530- hypotensive and diaphoretic bp 60's systolic- Dr. Ingram at the bedside; 12 lead ekg performed confirming rapid afib - rt Ej placed- amio 150 iv bolus and magnesium 2 grams ivpb give; ivf 500 cc ns and lalitha ivp given  bp increased to 150/60- pt converted back to rsr 70 @ 0630- amio load initiated and b-blockers d/c secondary to hypotension - Discussed with Dr. Bell- Echo small pericardial effusion, neg tamponade. chest xray: unchanged bilateral pleural effusions  8/19 bursts afib 100-150 - b-blockers resumed lop 25 bid; pt now rsr 70's since o730- zofran given for nausea; obtain chest ct r/o pericardial effusion   hep sq resumed plt > 100   discharge planning- home pt next week   Converted to NSR  Ct with bilat pleural effusions,  R>L, no pericardial eff.  For Right pigtail today.  8/21 Right pigtail 130/1000 pt nauseas this am Zofran given amiodarone d/c d/t transaminitis  Multaq 400 bid per cardiology Dr Perez CRP sent  8/22 CRP 6.10 loose liquid stool this am laxatives d/c Right pigtail D/C pacing d/c patient requesting Rehab d/w Dr Bell and care coordinators

## 2017-08-23 LAB
ALBUMIN SERPL ELPH-MCNC: 3.1 G/DL — LOW (ref 3.3–5)
ALP SERPL-CCNC: 119 U/L — SIGNIFICANT CHANGE UP (ref 40–120)
ALT FLD-CCNC: 38 U/L RC — SIGNIFICANT CHANGE UP (ref 10–45)
ANION GAP SERPL CALC-SCNC: 14 MMOL/L — SIGNIFICANT CHANGE UP (ref 5–17)
AST SERPL-CCNC: 26 U/L — SIGNIFICANT CHANGE UP (ref 10–40)
BILIRUB DIRECT SERPL-MCNC: 0.2 MG/DL — SIGNIFICANT CHANGE UP (ref 0–0.2)
BILIRUB INDIRECT FLD-MCNC: 0.5 MG/DL — SIGNIFICANT CHANGE UP (ref 0.2–1)
BILIRUB SERPL-MCNC: 0.7 MG/DL — SIGNIFICANT CHANGE UP (ref 0.2–1.2)
BUN SERPL-MCNC: 46 MG/DL — HIGH (ref 7–23)
CALCIUM SERPL-MCNC: 8.7 MG/DL — SIGNIFICANT CHANGE UP (ref 8.4–10.5)
CHLORIDE SERPL-SCNC: 98 MMOL/L — SIGNIFICANT CHANGE UP (ref 96–108)
CO2 SERPL-SCNC: 21 MMOL/L — LOW (ref 22–31)
CREAT SERPL-MCNC: 1.83 MG/DL — HIGH (ref 0.5–1.3)
GLUCOSE SERPL-MCNC: 134 MG/DL — HIGH (ref 70–99)
HCT VFR BLD CALC: 30.3 % — LOW (ref 34.5–45)
HGB BLD-MCNC: 10.3 G/DL — LOW (ref 11.5–15.5)
MCHC RBC-ENTMCNC: 33.6 PG — SIGNIFICANT CHANGE UP (ref 27–34)
MCHC RBC-ENTMCNC: 33.8 GM/DL — SIGNIFICANT CHANGE UP (ref 32–36)
MCV RBC AUTO: 99.4 FL — SIGNIFICANT CHANGE UP (ref 80–100)
PLATELET # BLD AUTO: 288 K/UL — SIGNIFICANT CHANGE UP (ref 150–400)
POTASSIUM SERPL-MCNC: 4.9 MMOL/L — SIGNIFICANT CHANGE UP (ref 3.5–5.3)
POTASSIUM SERPL-SCNC: 4.9 MMOL/L — SIGNIFICANT CHANGE UP (ref 3.5–5.3)
PROT SERPL-MCNC: 6 G/DL — SIGNIFICANT CHANGE UP (ref 6–8.3)
RBC # BLD: 3.05 M/UL — LOW (ref 3.8–5.2)
RBC # FLD: 13.9 % — SIGNIFICANT CHANGE UP (ref 10.3–14.5)
SODIUM SERPL-SCNC: 133 MMOL/L — LOW (ref 135–145)
WBC # BLD: 13.3 K/UL — HIGH (ref 3.8–10.5)
WBC # FLD AUTO: 13.3 K/UL — HIGH (ref 3.8–10.5)

## 2017-08-23 PROCEDURE — 93010 ELECTROCARDIOGRAM REPORT: CPT

## 2017-08-23 RX ORDER — ONDANSETRON 8 MG/1
4 TABLET, FILM COATED ORAL ONCE
Qty: 0 | Refills: 0 | Status: COMPLETED | OUTPATIENT
Start: 2017-08-23 | End: 2017-08-23

## 2017-08-23 RX ORDER — ACETAMINOPHEN 500 MG
650 TABLET ORAL EVERY 8 HOURS
Qty: 0 | Refills: 0 | Status: DISCONTINUED | OUTPATIENT
Start: 2017-08-23 | End: 2017-08-25

## 2017-08-23 RX ORDER — METOPROLOL TARTRATE 50 MG
12.5 TABLET ORAL
Qty: 0 | Refills: 0 | Status: DISCONTINUED | OUTPATIENT
Start: 2017-08-23 | End: 2017-08-24

## 2017-08-23 RX ADMIN — Medication 12.5 MILLIGRAM(S): at 18:40

## 2017-08-23 RX ADMIN — FAMOTIDINE 20 MILLIGRAM(S): 10 INJECTION INTRAVENOUS at 12:33

## 2017-08-23 RX ADMIN — Medication 0.25 MILLIGRAM(S): at 12:48

## 2017-08-23 RX ADMIN — DRONEDARONE 400 MILLIGRAM(S): 400 TABLET, FILM COATED ORAL at 18:40

## 2017-08-23 RX ADMIN — ONDANSETRON 4 MILLIGRAM(S): 8 TABLET, FILM COATED ORAL at 09:44

## 2017-08-23 RX ADMIN — HEPARIN SODIUM 5000 UNIT(S): 5000 INJECTION INTRAVENOUS; SUBCUTANEOUS at 05:26

## 2017-08-23 RX ADMIN — HEPARIN SODIUM 5000 UNIT(S): 5000 INJECTION INTRAVENOUS; SUBCUTANEOUS at 18:40

## 2017-08-23 RX ADMIN — DRONEDARONE 400 MILLIGRAM(S): 400 TABLET, FILM COATED ORAL at 05:26

## 2017-08-23 RX ADMIN — Medication 81 MILLIGRAM(S): at 12:33

## 2017-08-23 NOTE — PROGRESS NOTE ADULT - SUBJECTIVE AND OBJECTIVE BOX
S:  co nausea/wretching after taking dilaudid.      Telemetry:  SR/ST     Vital Signs Last 24 Hrs  T(C): 36.8 (17 @ 05:15), Max: 36.8 (17 @ 05:15)  T(F): 98.2 (17 @ 05:15), Max: 98.2 (17 @ 05:15)  HR: 97 (17 @ 05:15) (95 - 97)  BP: 109/72 (17 @ 05:15) (102/62 - 109/72)  RR: 17 (17 @ 05:15) (17 - 18)  SpO2: 95% (17 @ 05:15) (95% - 98%)                    @ 07:01  -   @ 07:00  --------------------------------------------------------  IN: 820 mL / OUT: 503 mL / NET: 317 mL     @ 07:01  -   @ 13:42  --------------------------------------------------------  IN: 480 mL / OUT: 551 mL / NET: -71 mL          Daily     Daily Weight in k.7 (23 Aug 2017 07:46)                                              10.3   13.3  )-----------( 288      ( 23 Aug 2017 06:24 )             30.3           133<L>  |  98  |  46<H>  ----------------------------<  134<H>  4.9   |  21<L>  |  1.83<H>    Ca    8.7      23 Aug 2017 06:24    TPro  6.0  /  Alb  3.1<L>  /  TBili  0.7  /  DBili  0.2  /  AST  26  /  ALT  38  /  AlkPhos  119  08-23          PHYSICAL EXAM:    Neurology: alert and oriented x 3, nonfocal, no gross deficits    CV :  S1S2 heard RRR No m/r/g    Sternal Wound :  CDI , Stable    Lungs:  clear to aucs  no w/r/r    Abdomen: soft, nontender, nondistended, positive bowel sounds, last bowel movement today                Extremities:   no edema            aspirin  chewable 81 milliGRAM(s) Oral daily  benzocaine 15 mG/menthol 3.6 mG Lozenge 1 Lozenge Oral every 3 hours PRN  famotidine    Tablet 20 milliGRAM(s) Oral daily  heparin  Injectable 5000 Unit(s) SubCutaneous every 12 hours  ALPRAZolam 0.25 milliGRAM(s) Oral three times a day PRN  dronedarone 400 milliGRAM(s) Oral two times a day  metoprolol 12.5 milliGRAM(s) Oral two times a day  acetaminophen   Tablet. 650 milliGRAM(s) Oral every 8 hours PRN                              Physical Therapy Rec:   Home  [  ]   Home w/ PT  [  ]  Rehab  [  ]    Discussed with Cardiothoracic Team at AM rounds.

## 2017-08-23 NOTE — PROGRESS NOTE ADULT - SUBJECTIVE AND OBJECTIVE BOX
Patient is a 83y Female  being evaluated for CKD 3  c/o fatigue  Voiding without difficulty. No dysuria, hematuria, urgency.  some nausea post dilaudid          PHYSICAL EXAM:  Vital Signs Last 24 Hrs  T(C): 36.8 (23 Aug 2017 05:15), Max: 36.8 (22 Aug 2017 12:21)  T(F): 98.2 (23 Aug 2017 05:15), Max: 98.2 (22 Aug 2017 12:21)  HR: 97 (23 Aug 2017 05:15) (95 - 101)  BP: 109/72 (23 Aug 2017 05:15) (102/62 - 110/64)  BP(mean): --  RR: 17 (23 Aug 2017 05:15) (17 - 18)  SpO2: 95% (23 Aug 2017 05:15) (94% - 98%)  General: no acute distress  Cardiovascular: no rub s1s2,valve sound rsb  Extremities: tr edema    I and O's:  08-20 @ 07:01  -  08-21 @ 07:00  --------------------------------------------------------  IN: 900 mL / OUT: 1450 mL / NET: -550 mL      MEDICATIONS  (STANDING):  aspirin  chewable 81 milliGRAM(s) Oral daily  famotidine    Tablet 20 milliGRAM(s) Oral daily  heparin  Injectable 5000 Unit(s) SubCutaneous every 12 hours  dronedarone 400 milliGRAM(s) Oral two times a day                  Sodium,Serum 134    08-21 @ 05:27  Sodium,Serum 134    08-20 @ 22:08  Sodium,Serum 136    08-20 @ 06:39  Sodium,Serum 138    08-19 @ 03:13  Sodium,Serum 137    08-18 @ 06:21  Sodium,Serum 136    08-17 @ 20:56  Sodium,Serum 137    08-17 @ 14:56    Potassium,Serum 4.7    08-21 @ 05:27  Potassium,Serum 4.5    08-20 @ 22:08  Potassium,Serum 4.7    08-20 @ 06:39  Potassium,Serum 4.5    08-19 @ 03:13  Potassium,Serum 4.9    08-18 @ 06:21  Potassium,Serum 5.0    08-17 @ 20:56  Potassium,Serum 4.8    08-17 @ 14:56    Chloride,Serum 98    08-21 @ 05:27  Chloride,Serum 99    08-20 @ 22:08  Chloride,Serum 101    08-20 @ 06:39  Chloride,Serum 101    08-19 @ 03:13  Chloride,Serum 100    08-18 @ 06:21  Chloride,Serum 100    08-17 @ 20:56  Chloride,Serum 99    08-17 @ 14:56    CO2, Serum 20    08-21 @ 05:27  CO2, Serum 19    08-20 @ 22:08  CO2, Serum 22    08-20 @ 06:39  CO2, Serum 22    08-19 @ 03:13  CO2, Serum 22    08-18 @ 06:21  CO2, Serum 20    08-17 @ 20:56  CO2, Serum 23    08-17 @ 14:56    BUN 54    08-21 @ 05:27  BUN 52    08-20 @ 22:08  BUN 47    08-20 @ 06:39  BUN 45    08-19 @ 03:13  BUN 44    08-18 @ 06:21  BUN 44    08-17 @ 20:56  BUN 44    08-17 @ 14:56    Creatinine, Serum 1.92    08-21 @ 05:27  Creatinine, Serum 1.90    08-20 @ 22:08  Creatinine, Serum 1.85    08-20 @ 06:39  Creatinine, Serum 1.76    08-19 @ 03:13  Creatinine, Serum 1.73    08-18 @ 06:21  Creatinine, Serum 1.88    08-17 @ 20:56  Creatinine, Serum 1.75    08-17 @ 14:56      08-21    134<L>  |  98  |  54<H>  ----------------------------<  135<H>  4.7   |  20<L>  |  1.92<H>    Ca    8.6      21 Aug 2017 05:27  Mg     2.6     08-20    TPro  5.9<L>  /  Alb  3.5  /  TBili  0.8  /  DBili  x   /  AST  75<H>  /  ALT  76<H>  /  AlkPhos  210<H>  08-20                            9.9    10.8  )-----------( 213      ( 21 Aug 2017 05:27 )             28.9       08-23    133<L>  |  98  |  46<H>  ----------------------------<  134<H>  4.9   |  21<L>  |  1.83<H>    Ca    8.7      23 Aug 2017 06:24

## 2017-08-23 NOTE — PROGRESS NOTE ADULT - SUBJECTIVE AND OBJECTIVE BOX
C/O nausea after taking dilaudid ? x2 last night.  c/o dyspnea worse with ambulation.    HR now 70-80s.  No further afib    REVIEW OF SYSTEMS:  CARDIOVASCULAR: No chest pain, dyspnea or palpitations  All other review of systems is negative unless indicated above    Medications:  aspirin  chewable 81 milliGRAM(s) Oral daily  HYDROmorphone   Tablet 2 milliGRAM(s) Oral every 3 hours PRN  benzocaine 15 mG/menthol 3.6 mG Lozenge 1 Lozenge Oral every 3 hours PRN  famotidine    Tablet 20 milliGRAM(s) Oral daily  heparin  Injectable 5000 Unit(s) SubCutaneous every 12 hours  ALPRAZolam 0.25 milliGRAM(s) Oral three times a day PRN  dronedarone 400 milliGRAM(s) Oral two times a day      Physical Exam:  Vitals:  T(C): 36.8 (17 @ 05:15), Max: 36.8 (17 @ 12:21)  HR: 97 (17 @ 05:15) (95 - 101)  BP: 109/72 (17 @ 05:15) (102/62 - 110/64)  BP(mean): --  RR: 17 (17 @ 05:15) (17 - 18)  SpO2: 95% (17 @ 05:15) (94% - 98%)      Daily Weight in k.7 (23 Aug 2017 07:46)  I&O's Summary    22 Aug 2017 07:  -  23 Aug 2017 07:00  --------------------------------------------------------  IN: 820 mL / OUT: 503 mL / NET: 317 mL    23 Aug 2017 07:  -  23 Aug 2017 09:01  --------------------------------------------------------  IN: 360 mL / OUT: 301 mL / NET: 59 mL        Appearance:  Normal, NAD  Eyes:  PERRL, EOMI  HEENT: Normal oral [place], NC/AT  Neck:  No JVD or HJR  Respiratory: Clear to auscultation bilaterally  Cardiovascular: Normal S1 and S2 with soft systolic murmur base and LSB.  No rubs or gallops  Abdomen:   BS normal, Soft,  Non-tender without organomegally or masses  Extremities: Without edema, pulses are full              Complete Blood Count (17 @ 06:24)    WBC Count: 13.3 K/uL    RBC Count: 3.05 M/uL    Hemoglobin: 10.3 g/dL    Hematocrit: 30.3 %    Mean Cell Volume: 99.4 fl    Mean Cell Hemoglobin: 33.6 pg    Mean Cell Hemoglobin Conc: 33.8 gm/dL    Red Cell Distrib Width: 13.9 %    Platelet Count - Automated: 288 K/uL        133<L>  |  98  |  46<H>  ----------------------------<  134<H>  4.9   |  21<L>  |  1.83<H>    Ca    8.7      23 Aug 2017 06:24

## 2017-08-23 NOTE — PROGRESS NOTE ADULT - ASSESSMENT
Nausea ? due to dilaudid  ? post-pericardiotomy syndrome with effusion, fatigue and elevated ESR, WBC and CRP    Plan: prn Zofran         Would re-start metoprolol 12.5 bid which patient was on for migraine prophylaxis         Would get f/u CXR.         EKG assess if any improvement in minimal ST elevation noted on 8/18

## 2017-08-23 NOTE — PROGRESS NOTE ADULT - ASSESSMENT
83 year old female with h/o ckd, palpitation's, MVP, migraine headaches, and nonrheumatic aortic valve stenosis, scheduled for aortic valve repair.  8/15/17 s/p AVR t  Post op thrombocytopenia not on dvt prophylaxis  C/o sore throat pain , placed on a dysphagia diet  Transferred to sdu 8/16  Mild improvement in eating, no BM since OR  CXR with small r pneumo, on water seal, will clamp for 3 hours and repeat cxr as per Dr bell- rt ct d/c 8/17   transfer to floor  8/18 rapid afib 180 @ 0530- hypotensive and diaphoretic bp 60's systolic- Dr. Ingram at the bedside; 12 lead ekg performed confirming rapid afib - rt Ej placed- amio 150 iv bolus and magnesium 2 grams ivpb give; ivf 500 cc ns and lalitha ivp given  bp increased to 150/60- pt converted back to rsr 70 @ 0630- amio load initiated and b-blockers d/c secondary to hypotension - Discussed with Dr. Bell- Echo small pericardial effusion, neg tamponade. chest xray: unchanged bilateral pleural effusions  8/19 bursts afib 100-150 - b-blockers resumed lop 25 bid; pt now rsr 70's since o730- zofran given for nausea; obtain chest ct r/o pericardial effusion   hep sq resumed plt > 100   discharge planning- home pt next week   Converted to NSR  Ct with bilat pleural effusions,  R>L, no pericardial eff.  For Right pigtail today.  8/21 Right pigtail 130/1000 pt nauseas this am Zofran given amiodarone d/c d/t transaminitis  Multaq 400 bid per cardiology Dr Perez CRP sent  8/22 CRP 6.10 loose liquid stool this am laxatives d/c Right pigtail D/C pacing d/c patient requesting Rehab d/w Dr Bell and care coordinators  8/23 nausea after dilaudid.  dilaudid dc'd.  Plan is for home not rehab.  Lopressor added for migraine prophylaxis per cardiologist.  FU CXR am 8/24

## 2017-08-24 LAB
ALBUMIN SERPL ELPH-MCNC: 3.1 G/DL — LOW (ref 3.3–5)
ALP SERPL-CCNC: 95 U/L — SIGNIFICANT CHANGE UP (ref 40–120)
ALT FLD-CCNC: 29 U/L RC — SIGNIFICANT CHANGE UP (ref 10–45)
ANION GAP SERPL CALC-SCNC: 10 MMOL/L — SIGNIFICANT CHANGE UP (ref 5–17)
AST SERPL-CCNC: 16 U/L — SIGNIFICANT CHANGE UP (ref 10–40)
BILIRUB DIRECT SERPL-MCNC: 0.2 MG/DL — SIGNIFICANT CHANGE UP (ref 0–0.2)
BILIRUB INDIRECT FLD-MCNC: 0.4 MG/DL — SIGNIFICANT CHANGE UP (ref 0.2–1)
BILIRUB SERPL-MCNC: 0.6 MG/DL — SIGNIFICANT CHANGE UP (ref 0.2–1.2)
BUN SERPL-MCNC: 43 MG/DL — HIGH (ref 7–23)
CALCIUM SERPL-MCNC: 8.7 MG/DL — SIGNIFICANT CHANGE UP (ref 8.4–10.5)
CHLORIDE SERPL-SCNC: 99 MMOL/L — SIGNIFICANT CHANGE UP (ref 96–108)
CO2 SERPL-SCNC: 25 MMOL/L — SIGNIFICANT CHANGE UP (ref 22–31)
CREAT SERPL-MCNC: 1.93 MG/DL — HIGH (ref 0.5–1.3)
GLUCOSE SERPL-MCNC: 110 MG/DL — HIGH (ref 70–99)
HCT VFR BLD CALC: 30.1 % — LOW (ref 34.5–45)
HGB BLD-MCNC: 9.8 G/DL — LOW (ref 11.5–15.5)
MCHC RBC-ENTMCNC: 32.5 GM/DL — SIGNIFICANT CHANGE UP (ref 32–36)
MCHC RBC-ENTMCNC: 32.9 PG — SIGNIFICANT CHANGE UP (ref 27–34)
MCV RBC AUTO: 101 FL — HIGH (ref 80–100)
PLATELET # BLD AUTO: 274 K/UL — SIGNIFICANT CHANGE UP (ref 150–400)
POTASSIUM SERPL-MCNC: 4.8 MMOL/L — SIGNIFICANT CHANGE UP (ref 3.5–5.3)
POTASSIUM SERPL-SCNC: 4.8 MMOL/L — SIGNIFICANT CHANGE UP (ref 3.5–5.3)
PROT SERPL-MCNC: 5.8 G/DL — LOW (ref 6–8.3)
RBC # BLD: 2.97 M/UL — LOW (ref 3.8–5.2)
RBC # FLD: 14.3 % — SIGNIFICANT CHANGE UP (ref 10.3–14.5)
SODIUM SERPL-SCNC: 134 MMOL/L — LOW (ref 135–145)
WBC # BLD: 10.8 K/UL — HIGH (ref 3.8–10.5)
WBC # FLD AUTO: 10.8 K/UL — HIGH (ref 3.8–10.5)

## 2017-08-24 PROCEDURE — 93010 ELECTROCARDIOGRAM REPORT: CPT

## 2017-08-24 PROCEDURE — 71010: CPT | Mod: 26

## 2017-08-24 RX ORDER — MAGNESIUM SULFATE 500 MG/ML
2 VIAL (ML) INJECTION ONCE
Qty: 0 | Refills: 0 | Status: DISCONTINUED | OUTPATIENT
Start: 2017-08-24 | End: 2017-08-25

## 2017-08-24 RX ORDER — METOPROLOL TARTRATE 50 MG
12.5 TABLET ORAL ONCE
Qty: 0 | Refills: 0 | Status: COMPLETED | OUTPATIENT
Start: 2017-08-24 | End: 2017-08-24

## 2017-08-24 RX ORDER — METOPROLOL TARTRATE 50 MG
2.5 TABLET ORAL ONCE
Qty: 0 | Refills: 0 | Status: COMPLETED | OUTPATIENT
Start: 2017-08-24 | End: 2017-08-24

## 2017-08-24 RX ORDER — METOPROLOL TARTRATE 50 MG
25 TABLET ORAL
Qty: 0 | Refills: 0 | Status: DISCONTINUED | OUTPATIENT
Start: 2017-08-24 | End: 2017-08-25

## 2017-08-24 RX ADMIN — Medication 25 MILLIGRAM(S): at 17:42

## 2017-08-24 RX ADMIN — Medication 81 MILLIGRAM(S): at 11:39

## 2017-08-24 RX ADMIN — Medication 2.5 MILLIGRAM(S): at 02:39

## 2017-08-24 RX ADMIN — HEPARIN SODIUM 5000 UNIT(S): 5000 INJECTION INTRAVENOUS; SUBCUTANEOUS at 17:39

## 2017-08-24 RX ADMIN — Medication 12.5 MILLIGRAM(S): at 08:57

## 2017-08-24 RX ADMIN — DRONEDARONE 400 MILLIGRAM(S): 400 TABLET, FILM COATED ORAL at 05:15

## 2017-08-24 RX ADMIN — Medication 12.5 MILLIGRAM(S): at 05:15

## 2017-08-24 RX ADMIN — HEPARIN SODIUM 5000 UNIT(S): 5000 INJECTION INTRAVENOUS; SUBCUTANEOUS at 05:15

## 2017-08-24 RX ADMIN — FAMOTIDINE 20 MILLIGRAM(S): 10 INJECTION INTRAVENOUS at 11:39

## 2017-08-24 RX ADMIN — DRONEDARONE 400 MILLIGRAM(S): 400 TABLET, FILM COATED ORAL at 17:39

## 2017-08-24 NOTE — PROGRESS NOTE ADULT - ASSESSMENT
83 year old female with h/o ckd, palpitation's, MVP, migraine headaches, and nonrheumatic aortic valve stenosis, scheduled for aortic valve repair.  8/15/17 s/p AVR t  Post op thrombocytopenia not on dvt prophylaxis  C/o sore throat pain , placed on a dysphagia diet  Transferred to sdu 8/16  Mild improvement in eating, no BM since OR  CXR with small r pneumo, on water seal, will clamp for 3 hours and repeat cxr as per Dr bell- rt ct d/c 8/17   transfer to floor  8/18 rapid afib 180 @ 0530- hypotensive and diaphoretic bp 60's systolic- Dr. Ingram at the bedside; 12 lead ekg performed confirming rapid afib - rt Ej placed- amio 150 iv bolus and magnesium 2 grams ivpb give; ivf 500 cc ns and lalitha ivp given  bp increased to 150/60- pt converted back to rsr 70 @ 0630- amio load initiated and b-blockers d/c secondary to hypotension - Discussed with Dr. Bell- Echo small pericardial effusion, neg tamponade. chest xray: unchanged bilateral pleural effusions  8/19 bursts afib 100-150 - b-blockers resumed lop 25 bid; pt now rsr 70's since o730- zofran given for nausea; obtain chest ct r/o pericardial effusion   hep sq resumed plt > 100   discharge planning- home pt next week   Converted to NSR  Ct with bilat pleural effusions,  R>L, no pericardial eff.  For Right pigtail today.  8/21 Right pigtail 130/1000 pt nauseas this am Zofran given amiodarone d/c d/t transaminitis  Multaq 400 bid per cardiology Dr Perez CRP sent  8/22 CRP 6.10 loose liquid stool this am laxatives d/c Right pigtail D/C pacing d/c patient requesting Rehab d/w Dr Bell and care coordinators  8/23 nausea after dilaudid.  dilaudid dc'd.  Plan is for home not rehab.  Lopressor added for migraine prophylaxis per cardiologist.  FU CXR am 8/24 83 year old female with h/o ckd, palpitation's, MVP, migraine headaches, and nonrheumatic aortic valve stenosis, scheduled for aortic valve repair.  8/15/17 s/p AVR t  Post op thrombocytopenia not on dvt prophylaxis  C/o sore throat pain , placed on a dysphagia diet  Transferred to sdu 8/16  Mild improvement in eating, no BM since OR  CXR with small r pneumo, on water seal, will clamp for 3 hours and repeat cxr as per Dr bell- rt ct d/c 8/17   transfer to floor  8/18 rapid afib 180 @ 0530- hypotensive and diaphoretic bp 60's systolic- Dr. Ingram at the bedside; 12 lead ekg performed confirming rapid afib - rt Ej placed- amio 150 iv bolus and magnesium 2 grams ivpb give; ivf 500 cc ns and lalitha ivp given  bp increased to 150/60- pt converted back to rsr 70 @ 0630- amio load initiated and b-blockers d/c secondary to hypotension - Discussed with Dr. Bell- Echo small pericardial effusion, neg tamponade. chest xray: unchanged bilateral pleural effusions  8/19 bursts afib 100-150 - b-blockers resumed lop 25 bid; pt now rsr 70's since o730- zofran given for nausea; obtain chest ct r/o pericardial effusion   hep sq resumed plt > 100   discharge planning- home pt next week   Converted to NSR  Ct with bilat pleural effusions,  R>L, no pericardial eff.  For Right pigtail today.  8/21 Right pigtail 130/1000 pt nauseas this am Zofran given amiodarone d/c d/t transaminitis  Multaq 400 bid per cardiology Dr Perez CRP sent  8/22 CRP 6.10 loose liquid stool this am laxatives d/c Right pigtail D/C pacing d/c patient requesting Rehab d/w Dr Bell and care coordinators  8/23 nausea after dilaudid.  dilaudid dc'd.  Plan is for home not rehab.  Lopressor added for migraine prophylaxis per cardiologist.  FU CXR am 8/24 8/24 aflutter overnight.  110-156.  Lopressor increased.

## 2017-08-24 NOTE — PROGRESS NOTE ADULT - PROBLEM SELECTOR PROBLEM 3
Thrombocytopenia

## 2017-08-24 NOTE — PROVIDER CONTACT NOTE (OTHER) - ACTION/TREATMENT ORDERED:
RODNEY Cooper aware, at bedside with pt, will give Lopressor 2.5 mg IVP as per orders. Pt safety maintained.
250 mL NS bolus over 2 hrs, VS Q 4 hrs, send CMP, CBc & Mag level at 2200, continue to monitor
Lopressor 2.5mg IVP. BMP, CBC, MG, Phos NOW. Mg 2mg IVPB
NP called in patient room. JORDON Sanchez ; Shikha Stevenson, JORDON and intensivist at bedside.  500 ml of IV bolus given, stat CBC and BMP sent, ABG sent. Amiodarone IV load given, Mag 2gm given. and Osmar stick
as DEBORAH Preciado lizet the bloods she informed the Pt of reason for blood and Pt consented again to having bloods drawn. Supervisor Anibal made aware.

## 2017-08-24 NOTE — PROGRESS NOTE ADULT - PROBLEM SELECTOR PROBLEM 4
Pneumothorax, unspecified type

## 2017-08-24 NOTE — PROGRESS NOTE ADULT - ASSESSMENT
Imp:  83y Female with CKD 3  s/p AVR  afib  w RVR  pleural effusion  stable renal function  borderline SNa -monitor

## 2017-08-24 NOTE — PROVIDER CONTACT NOTE (OTHER) - ASSESSMENT
Pt asymptomatic, no complaints of CP/SOB.  HR = 114 bpm   BP = 105/66 mmhg
/81 . pt just came out of bathroom. says " my heart is racing"
A&Ox4, pt c/o pain, IV tylenol just administered, manual BP taken & consistent with electronic BP, no c/o CP or SOB, EPM set to 60/10
Manual BP: 66/39, HR: 148 AFib. Pt. c/o of lightheadness, dizziness. " feel like I am about to faint"

## 2017-08-24 NOTE — PROGRESS NOTE ADULT - ASSESSMENT
Shortlived PAF    Plan: Increase metoprolol to 25 mg bid  CXR and EKG today.  Observe on tele.  If further episodes of Afib may need anticoagulation

## 2017-08-24 NOTE — PROGRESS NOTE ADULT - PROBLEM SELECTOR PLAN 4
repeat cxr this am
repeat cxr this am
repeat cxr this am  Pneumo stable
repeat cxr

## 2017-08-24 NOTE — PROGRESS NOTE ADULT - PROBLEM SELECTOR PLAN 3
hold sq dvt prophylaxis, cont asa, f/u platelet count
hold sq dvt prophylaxis, cont asa, f/u platelet count  repeat plt today 85
resolved  resume hep sq today
hold sq dvt prophylaxis, cont asa, f/u platelet count

## 2017-08-24 NOTE — PROGRESS NOTE ADULT - SUBJECTIVE AND OBJECTIVE BOX
S:  feels better  Telemetry: SR / Aflutter 110-156    Vital Signs Last 24 Hrs  T(C): 36.5 (17 @ 13:18), Max: 37.1 (17 @ 20:30)  T(F): 97.7 (17 @ 13:18), Max: 98.8 (17 @ 20:30)  HR: 73 (17 @ 13:18) (73 - 114)  BP: 99/65 (17 @ 13:18) (99/65 - 106/65)  RR: 18 (17 @ 13:18) (17 - 18)  SpO2: 98% (17 @ 13:18) (95% - 98%)                    @ 07:01  -   @ 07:00  --------------------------------------------------------  IN: 1040 mL / OUT: 1172 mL / NET: -132 mL     @ 07:01  -   @ 13:43  --------------------------------------------------------  IN: 480 mL / OUT: 301 mL / NET: 179 mL            Daily Weight in k.3 (24 Aug 2017 07:11)                                 9.8    10.8  )-----------( 274      ( 24 Aug 2017 06:02 )             30.1           134<L>  |  99  |  43<H>  ----------------------------<  110<H>  4.8   |  25  |  1.93<H>    Ca    8.7      24 Aug 2017 06:02    TPro  5.8<L>  /  Alb  3.1<L>  /  TBili  0.6  /  DBili  0.2  /  AST  16  /  ALT  29  /  AlkPhos  95  08-24          PHYSICAL EXAM:    Neurology: alert and oriented x 3, nonfocal, no gross deficits    CV : S1S2 heard RRR No m/r/g    Sternal Wound :  CDI , Stable.      Lungs:    Abdomen: soft, nontender, nondistended, positive bowel sounds, last bowel movement     :               Extremities:               aspirin  chewable 81 milliGRAM(s) Oral daily  benzocaine 15 mG/menthol 3.6 mG Lozenge 1 Lozenge Oral every 3 hours PRN  famotidine    Tablet 20 milliGRAM(s) Oral daily  heparin  Injectable 5000 Unit(s) SubCutaneous every 12 hours  ALPRAZolam 0.25 milliGRAM(s) Oral three times a day PRN  dronedarone 400 milliGRAM(s) Oral two times a day  acetaminophen   Tablet. 650 milliGRAM(s) Oral every 8 hours PRN  magnesium sulfate  IVPB 2 Gram(s) IV Intermittent once  metoprolol 25 milliGRAM(s) Oral two times a day  metoprolol 12.5 milliGRAM(s) Oral once                              Physical Therapy Rec:   Home  [  ]   Home w/ PT  [  ]  Rehab  [  ]    Discussed with Cardiothoracic Team at AM rounds. S:  feels better.  nausea resolved    Telemetry: SR / Aflutter 110-156    Vital Signs Last 24 Hrs  T(C): 36.5 (17 @ 13:18), Max: 37.1 (17 @ 20:30)  T(F): 97.7 (17 @ 13:18), Max: 98.8 (17 @ 20:30)  HR: 73 (17 @ 13:18) (73 - 114)  BP: 99/65 (17 @ 13:18) (99/65 - 106/65)  RR: 18 (17 @ 13:18) (17 - 18)  SpO2: 98% (17 @ 13:18) (95% - 98%)                    @ 07:01  -   @ 07:00  --------------------------------------------------------  IN: 1040 mL / OUT: 1172 mL / NET: -132 mL     @ 07:01  -   @ 13:43  --------------------------------------------------------  IN: 480 mL / OUT: 301 mL / NET: 179 mL            Daily Weight in k.3 (24 Aug 2017 07:11)                                 9.8    10.8  )-----------( 274      ( 24 Aug 2017 06:02 )             30.1           134<L>  |  99  |  43<H>  ----------------------------<  110<H>  4.8   |  25  |  1.93<H>    Ca    8.7      24 Aug 2017 06:02    TPro  5.8<L>  /  Alb  3.1<L>  /  TBili  0.6  /  DBili  0.2  /  AST  16  /  ALT  29  /  AlkPhos  95  08-24          PHYSICAL EXAM:    Neurology: alert and oriented x 3, nonfocal, no gross deficits    CV : S1S2 heard RRR No m/r/g    Sternal Wound :  CDI , Stable.      Lungs:  clear to ausc    Abdomen: soft, nontender, nondistended, positive bowel sounds, last bowel movement today             Extremities:     no edema          aspirin  chewable 81 milliGRAM(s) Oral daily  benzocaine 15 mG/menthol 3.6 mG Lozenge 1 Lozenge Oral every 3 hours PRN  famotidine    Tablet 20 milliGRAM(s) Oral daily  heparin  Injectable 5000 Unit(s) SubCutaneous every 12 hours  ALPRAZolam 0.25 milliGRAM(s) Oral three times a day PRN  dronedarone 400 milliGRAM(s) Oral two times a day  acetaminophen   Tablet. 650 milliGRAM(s) Oral every 8 hours PRN  magnesium sulfate  IVPB 2 Gram(s) IV Intermittent once  metoprolol 25 milliGRAM(s) Oral two times a day  metoprolol 12.5 milliGRAM(s) Oral once                              Physical Therapy Rec:   Home  [  ]   Home w/ PT  [  ]  Rehab  [ x ]    Discussed with Cardiothoracic Team at AM rounds.

## 2017-08-24 NOTE — PROVIDER CONTACT NOTE (OTHER) - SITUATION
Pt converted from NSR to Afib 110s-140s on tele
PA inserting CT on Pt with large Right sided pleural effusion. RODNEY Kaba got stuck with a needle.
Pt BP 93/63 on L & 88/51 on R
RN in patient room to check patient blood pressure, patient in Rapid Afib. Unable to get automatic BP. Manual BP: 66/39, HR: 148. Pt. c/o of lightheadness, dizziness. " feel like I am about to faint"
ROSALIE

## 2017-08-24 NOTE — PROGRESS NOTE ADULT - PROBLEM SELECTOR PROBLEM 5
Atrial fibrillation, unspecified type

## 2017-08-24 NOTE — PROVIDER CONTACT NOTE (OTHER) - BACKGROUND
8/15 AVR (B). PMHX - MV prolapse, non rheumatic AS. Amiodarone 400mg TID
8/15 AVR (b), p/o transfusion 8/18 ROSALIE in 160's, SBP 60's, 8/20 R pigtail
Pt post-op AVR (B). with Hx: of pleural effusions.
Pt. S/P AVR bovine on 8/15
8/15 AVR  8/18 ROSALIE up to 160s  8/20 right pigtail d/c'd

## 2017-08-24 NOTE — PROGRESS NOTE ADULT - PROBLEM SELECTOR PLAN 5
pt converted to rsr- bp stable    resume b-blockers today  ck electrolytes  amio d/c r/t transaminitis   multaq 400 bid 8/21 aflutter overnight  BB increased to 25 BID  multaq 400 bid

## 2017-08-24 NOTE — PROGRESS NOTE ADULT - SUBJECTIVE AND OBJECTIVE BOX
Had approximately 20 minutes Afib early AM walking to bathroom.  C/O exertional dyspnea.  Feels palpitation when ambulating.No further nausea.  No chest pain.       REVIEW OF SYSTEMS:  CARDIOVASCULAR: No chest pain,  All other review of systems is negative unless indicated above    Medications:  aspirin  chewable 81 milliGRAM(s) Oral daily  benzocaine 15 mG/menthol 3.6 mG Lozenge 1 Lozenge Oral every 3 hours PRN  famotidine    Tablet 20 milliGRAM(s) Oral daily  heparin  Injectable 5000 Unit(s) SubCutaneous every 12 hours  ALPRAZolam 0.25 milliGRAM(s) Oral three times a day PRN  dronedarone 400 milliGRAM(s) Oral two times a day  acetaminophen   Tablet. 650 milliGRAM(s) Oral every 8 hours PRN  magnesium sulfate  IVPB 2 Gram(s) IV Intermittent once      Physical Exam:  Vitals:  T(C): 36.7 (17 @ 04:40), Max: 37.1 (17 @ 20:30)  HR: 73 (17 @ 04:40) (73 - 114)  BP: 104/62 (17 @ 04:40) (102/66 - 106/65)  BP(mean): --  RR: 17 (17 @ 04:40) (17 - 18)  SpO2: 98% (17 @ 04:40) (95% - 98%)  Wt(kg): --  Daily     Daily Weight in k.3 (24 Aug 2017 07:11)  I&O's Summary    23 Aug 2017 07:  -  24 Aug 2017 07:00  --------------------------------------------------------  IN: 1040 mL / OUT: 1172 mL / NET: -132 mL    24 Aug 2017 07:  -  24 Aug 2017 08:24  --------------------------------------------------------  IN: 0 mL / OUT: 300 mL / NET: -300 mL        Appearance:  Normal, NAD  Eyes:  PERRL, EOMI  HEENT: Normal oral mus[place]a, NC/AT  Neck:  No JVD or HJR  Respiratory: Clear to auscultation R, decreased BS  left basebilaterally  Cardiovascular: Normal S1 and S2 with faint systolic murmur left sternal border.  No rubs or gallops  Abdomen:   BS normal, Soft,  Non-tender without organomegally or masses  Extremities: Trace bilateral leg edema              Complete Blood Count in AM (17 @ 06:02)    WBC Count: 10.8 K/uL    RBC Count: 2.97 M/uL    Hemoglobin: 9.8 g/dL    Hematocrit: 30.1 %    Mean Cell Volume: 101.0 fl    Mean Cell Hemoglobin: 32.9 pg    Mean Cell Hemoglobin Conc: 32.5 gm/dL    Red Cell Distrib Width: 14.3 %    Platelet Count - Automated: 274 K/uL        134<L>  |  99  |  43<H>  ----------------------------<  110<H>  4.8   |  25  |  1.93<H>    Ca    8.7      24 Aug 2017 06:02    TPro  5.8<L>  /  Alb  3.1<L>  /  TBili  0.6  /  DBili  0.2  /  AST  16  /  ALT  29  /  AlkPhos  95

## 2017-08-24 NOTE — PROVIDER CONTACT NOTE (OTHER) - REASON
Pt BP 93/63 on L & 88/51 on R
ROSALIE
Semaj Aldrich
Pt converted from NSR to Afib 110s-140s on tele
Patient BP: 66/39, Rapid Afib, c/o of lightheadness

## 2017-08-24 NOTE — PROGRESS NOTE ADULT - SUBJECTIVE AND OBJECTIVE BOX
Patient is a 83y Female  being evaluated for   CKD 3  events noted   +VILLAFUERTE        PHYSICAL EXAM:  Vitals:T(C): 36.7 (08-24-17 @ 04:40), Max: 37.1 (08-23-17 @ 20:30)  HR: 73 (08-24-17 @ 04:40) (73 - 114)  BP: 104/62 (08-24-17 @ 04:40) (102/66 - 106/65)  RR: 17 (08-24-17 @ 04:40) (17 - 18)  SpO2: 98% (08-24-17 @ 04:40) (95% - 98%)  Wt(kg): --    General: no acute distress  HEENT:  NC, ext ears nl, oropharynx clear,  nose nl  Neck: No JVD, bruit, adenopathy or thyromegaly  Eyes: PERRL, no discharge, no icterus  Respiratory: dec bs bases , no wheezes, rales, nl effort  Cardiovascular: irregular rate, S1 and S2 no rub or gallop  Abd: : BS+, soft, NT , no rebound or guarding, no bruits  Extremities: no edema, no cyanosis, palpable pulses  Neurological: A/O x 3, nl coordination and tone    I and O's:  08-23 @ 07:01  -  08-24 @ 07:00  --------------------------------------------------------  IN: 1040 mL / OUT: 1172 mL / NET: -132 mL    08-24 @ 07:01  -  08-24 @ 09:21  --------------------------------------------------------  IN: 0 mL / OUT: 300 mL / NET: -300 mL              REVIEW OF SYSTEMS:  CONSTITUTIONAL: No weakness, fevers or chills  RESPIRATORY: No cough, wheezing, hemoptysis   CARDIOVASCULAR: No chest pain or palpitations  GI : no abd pain, nausea or vomiting  GENITOURINARY: No dysuria, frequency or hematuria  All other review of systems is negative unless indicated above.    Allergies    No Known Allergies    Intolerances    epinephrine (Other)        MEDICATIONS  (STANDING):  aspirin  chewable 81 milliGRAM(s) Oral daily  famotidine    Tablet 20 milliGRAM(s) Oral daily  heparin  Injectable 5000 Unit(s) SubCutaneous every 12 hours  dronedarone 400 milliGRAM(s) Oral two times a day  magnesium sulfate  IVPB 2 Gram(s) IV Intermittent once  metoprolol 25 milliGRAM(s) Oral two times a day      Sodium,Serum 134    08-24 @ 06:02  Sodium,Serum 133    08-23 @ 06:24  Sodium,Serum 133    08-22 @ 06:09  Sodium,Serum 134    08-21 @ 05:27  Sodium,Serum 134    08-20 @ 22:08    Potassium,Serum 4.8    08-24 @ 06:02  Potassium,Serum 4.9    08-23 @ 06:24  Potassium,Serum 4.5    08-22 @ 06:09  Potassium,Serum 4.7    08-21 @ 05:27  Potassium,Serum 4.5    08-20 @ 22:08    Chloride,Serum 99    08-24 @ 06:02  Chloride,Serum 98    08-23 @ 06:24  Chloride,Serum 97    08-22 @ 06:09  Chloride,Serum 98    08-21 @ 05:27  Chloride,Serum 99    08-20 @ 22:08    CO2, Serum 25    08-24 @ 06:02  CO2, Serum 21    08-23 @ 06:24  CO2, Serum 21    08-22 @ 06:09  CO2, Serum 20    08-21 @ 05:27  CO2, Serum 19    08-20 @ 22:08    BUN 43    08-24 @ 06:02  BUN 46    08-23 @ 06:24  BUN 52    08-22 @ 06:09  BUN 54    08-21 @ 05:27  BUN 52    08-20 @ 22:08    Creatinine, Serum 1.93    08-24 @ 06:02  Creatinine, Serum 1.83    08-23 @ 06:24  Creatinine, Serum 1.89    08-22 @ 06:09  Creatinine, Serum 1.92    08-21 @ 05:27  Creatinine, Serum 1.90    08-20 @ 22:08      08-24    134<L>  |  99  |  43<H>  ----------------------------<  110<H>  4.8   |  25  |  1.93<H>    Ca    8.7      24 Aug 2017 06:02    TPro  5.8<L>  /  Alb  3.1<L>  /  TBili  0.6  /  DBili  0.2  /  AST  16  /  ALT  29  /  AlkPhos  95  08-24                            9.8    10.8  )-----------( 274      ( 24 Aug 2017 06:02 )             30.1

## 2017-08-24 NOTE — PROGRESS NOTE ADULT - PROBLEM SELECTOR PLAN 1
- Start beta blockers, statin, aspirin  - Pain management  - Glucose control  - DVT prophylaxis (Venodynes)  - GI prophylaxis  - D/c mediastinal sump in AM  - Patient discussed on morning interdisciplinary rounds with CTS team.    - Arina-operative antibiotics (Zinacef) to be discontinued within 48 hours of CTU admission
Asa, Statin, B-blocker, Chest PT,  Incentive spirometry, wound care and assessment.  Ambulate   Shower pod #5
Asa, Statin, B-blocker- d/c due to hypotension this , Chest PT,  Incentive spirometry, wound care and assessment.  Ambulate   discharge planning - await pt eval
Asa, Statin, resume b-blockers/ amio/  , Chest PT,  Incentive spirometry, wound care and assessment.  increase ambulation as tolerated  chest ct r/o pericardial effusion   discharge planning - home pt next week
Asa, Statin, resume b-blockers/ amio/  , Chest PT,  Incentive spirometry, wound care and assessment.  increase ambulation as tolerated  chest ct r/o pericardial effusion   discharge planning - home pt next week
Asa, Statin, resume b-blockers/ multaq  ,   Chest PT,  Incentive spirometry, wound care and assessment.  increase ambulation as tolerated  chest ct r/o pericardial effusion   pacing wires d/c pigtail d/c 8/22   discharge planning - rehab
Asa, Statin, B-blocker, Chest PT,  Incentive spirometry, wound care and assessment.  Ambulate   Shower pod #5
Asa, Statin, resume b-blockers/ amio/  , Chest PT,  Incentive spirometry, wound care and assessment.  increase ambulation as tolerated  chest ct r/o pericardial effusion   discharge planning - home pt next week

## 2017-08-24 NOTE — PROGRESS NOTE ADULT - PROBLEM SELECTOR PROBLEM 2
Incisional pain

## 2017-08-25 VITALS — WEIGHT: 123.46 LBS

## 2017-08-25 LAB
ANION GAP SERPL CALC-SCNC: 16 MMOL/L — SIGNIFICANT CHANGE UP (ref 5–17)
BUN SERPL-MCNC: 41 MG/DL — HIGH (ref 7–23)
CALCIUM SERPL-MCNC: 8.7 MG/DL — SIGNIFICANT CHANGE UP (ref 8.4–10.5)
CHLORIDE SERPL-SCNC: 100 MMOL/L — SIGNIFICANT CHANGE UP (ref 96–108)
CO2 SERPL-SCNC: 21 MMOL/L — LOW (ref 22–31)
CREAT SERPL-MCNC: 2.05 MG/DL — HIGH (ref 0.5–1.3)
GLUCOSE SERPL-MCNC: 104 MG/DL — HIGH (ref 70–99)
HCT VFR BLD CALC: 26.7 % — LOW (ref 34.5–45)
HGB BLD-MCNC: 9.2 G/DL — LOW (ref 11.5–15.5)
MCHC RBC-ENTMCNC: 34.4 PG — HIGH (ref 27–34)
MCHC RBC-ENTMCNC: 34.6 GM/DL — SIGNIFICANT CHANGE UP (ref 32–36)
MCV RBC AUTO: 99.3 FL — SIGNIFICANT CHANGE UP (ref 80–100)
PLATELET # BLD AUTO: 267 K/UL — SIGNIFICANT CHANGE UP (ref 150–400)
POTASSIUM SERPL-MCNC: 4.4 MMOL/L — SIGNIFICANT CHANGE UP (ref 3.5–5.3)
POTASSIUM SERPL-SCNC: 4.4 MMOL/L — SIGNIFICANT CHANGE UP (ref 3.5–5.3)
RBC # BLD: 2.69 M/UL — LOW (ref 3.8–5.2)
RBC # FLD: 14.2 % — SIGNIFICANT CHANGE UP (ref 10.3–14.5)
SODIUM SERPL-SCNC: 137 MMOL/L — SIGNIFICANT CHANGE UP (ref 135–145)
WBC # BLD: 9 K/UL — SIGNIFICANT CHANGE UP (ref 3.8–10.5)
WBC # FLD AUTO: 9 K/UL — SIGNIFICANT CHANGE UP (ref 3.8–10.5)

## 2017-08-25 PROCEDURE — 93010 ELECTROCARDIOGRAM REPORT: CPT

## 2017-08-25 PROCEDURE — 99239 HOSP IP/OBS DSCHRG MGMT >30: CPT

## 2017-08-25 RX ORDER — ALPRAZOLAM 0.25 MG
1 TABLET ORAL
Qty: 0 | Refills: 0 | DISCHARGE
Start: 2017-08-25

## 2017-08-25 RX ORDER — BENZOCAINE AND MENTHOL 5; 1 G/100ML; G/100ML
1 LIQUID ORAL
Qty: 0 | Refills: 0 | DISCHARGE
Start: 2017-08-25

## 2017-08-25 RX ORDER — DRONEDARONE 400 MG/1
1 TABLET, FILM COATED ORAL
Qty: 0 | Refills: 0 | DISCHARGE
Start: 2017-08-25

## 2017-08-25 RX ORDER — METOPROLOL TARTRATE 50 MG
1 TABLET ORAL
Qty: 0 | Refills: 0 | DISCHARGE
Start: 2017-08-25

## 2017-08-25 RX ORDER — ACETAMINOPHEN 500 MG
2 TABLET ORAL
Qty: 0 | Refills: 0 | DISCHARGE
Start: 2017-08-25

## 2017-08-25 RX ORDER — ASPIRIN/CALCIUM CARB/MAGNESIUM 324 MG
1 TABLET ORAL
Qty: 0 | Refills: 0 | DISCHARGE
Start: 2017-08-25

## 2017-08-25 RX ORDER — FAMOTIDINE 10 MG/ML
1 INJECTION INTRAVENOUS
Qty: 0 | Refills: 0 | DISCHARGE
Start: 2017-08-25

## 2017-08-25 RX ADMIN — Medication 81 MILLIGRAM(S): at 11:07

## 2017-08-25 RX ADMIN — FAMOTIDINE 20 MILLIGRAM(S): 10 INJECTION INTRAVENOUS at 11:07

## 2017-08-25 RX ADMIN — Medication 15 MILLIGRAM(S): at 06:48

## 2017-08-25 RX ADMIN — Medication 25 MILLIGRAM(S): at 06:48

## 2017-08-25 RX ADMIN — HEPARIN SODIUM 5000 UNIT(S): 5000 INJECTION INTRAVENOUS; SUBCUTANEOUS at 06:48

## 2017-08-25 RX ADMIN — DRONEDARONE 400 MILLIGRAM(S): 400 TABLET, FILM COATED ORAL at 06:48

## 2017-08-25 NOTE — PROGRESS NOTE ADULT - ASSESSMENT
No further atrial arrhthymias x 2 days.  On increased metoprolol  ? Post-pericardiotomy syndrome.    Plan: Transfer to rehab.           Would get f/u CXR next week and follow up ESR and CRP           If improved, would start to taper prednisone in1 week by 5 mg q week.

## 2017-08-25 NOTE — PROGRESS NOTE ADULT - SUBJECTIVE AND OBJECTIVE BOX
Patient is a 83y Female  being evaluated for   CKD 3  notes and meds reviewed  pt sleeping comfortably        PHYSICAL EXAM:  Vital Signs Last 24 Hrs  T(C): 36.8 (25 Aug 2017 04:18), Max: 37 (24 Aug 2017 20:02)  T(F): 98.2 (25 Aug 2017 04:18), Max: 98.6 (24 Aug 2017 20:02)  HR: 90 (25 Aug 2017 04:18) (73 - 90)  BP: 116/66 (25 Aug 2017 04:18) (99/65 - 116/66)  BP(mean): --  RR: 17 (25 Aug 2017 04:18) (17 - 18)  SpO2: 95% (25 Aug 2017 04:18) (95% - 98%)                Allergies    No Known Allergies    Intolerances    epinephrine (Other)        MEDICATIONS  (STANDING):  aspirin  chewable 81 milliGRAM(s) Oral daily  famotidine    Tablet 20 milliGRAM(s) Oral daily  heparin  Injectable 5000 Unit(s) SubCutaneous every 12 hours  dronedarone 400 milliGRAM(s) Oral two times a day  magnesium sulfate  IVPB 2 Gram(s) IV Intermittent once  metoprolol 25 milliGRAM(s) Oral two times a day  predniSONE   Tablet 15 milliGRAM(s) Oral daily    MEDICATIONS  (PRN):  benzocaine 15 mG/menthol 3.6 mG Lozenge 1 Lozenge Oral every 3 hours PRN Sore Throat  ALPRAZolam 0.25 milliGRAM(s) Oral three times a day PRN anxiety  acetaminophen   Tablet. 650 milliGRAM(s) Oral every 8 hours PRN Moderate Pain (4 - 6)        Sodium,Serum 134    08-24 @ 06:02  Sodium,Serum 133    08-23 @ 06:24  Sodium,Serum 133    08-22 @ 06:09  Sodium,Serum 134    08-21 @ 05:27  Sodium,Serum 134    08-20 @ 22:08    Potassium,Serum 4.8    08-24 @ 06:02  Potassium,Serum 4.9    08-23 @ 06:24  Potassium,Serum 4.5    08-22 @ 06:09  Potassium,Serum 4.7    08-21 @ 05:27  Potassium,Serum 4.5    08-20 @ 22:08    Chloride,Serum 99    08-24 @ 06:02  Chloride,Serum 98    08-23 @ 06:24  Chloride,Serum 97    08-22 @ 06:09  Chloride,Serum 98    08-21 @ 05:27  Chloride,Serum 99    08-20 @ 22:08    CO2, Serum 25    08-24 @ 06:02  CO2, Serum 21    08-23 @ 06:24  CO2, Serum 21    08-22 @ 06:09  CO2, Serum 20    08-21 @ 05:27  CO2, Serum 19    08-20 @ 22:08    BUN 43    08-24 @ 06:02  BUN 46    08-23 @ 06:24  BUN 52    08-22 @ 06:09  BUN 54    08-21 @ 05:27  BUN 52    08-20 @ 22:08    Creatinine, Serum 1.93    08-24 @ 06:02  Creatinine, Serum 1.83    08-23 @ 06:24  Creatinine, Serum 1.89    08-22 @ 06:09  Creatinine, Serum 1.92    08-21 @ 05:27  Creatinine, Serum 1.90    08-20 @ 22:08      08-24    134<L>  |  99  |  43<H>  ----------------------------<  110<H>  4.8   |  25  |  1.93<H>    Ca    8.7      24 Aug 2017 06:02    TPro  5.8<L>  /  Alb  3.1<L>  /  TBili  0.6  /  DBili  0.2  /  AST  16  /  ALT  29  /  AlkPhos  95  08-24                            9.8    10.8  )-----------( 274      ( 24 Aug 2017 06:02 )             30.1   08-25    137  |  100  |  41<H>  ----------------------------<  104<H>  4.4   |  21<L>  |  2.05<H>    Ca    8.7      25 Aug 2017 06:28    TPro  5.8<L>  /  Alb  3.1<L>  /  TBili  0.6  /  DBili  0.2  /  AST  16  /  ALT  29  /  AlkPhos  95  08-24

## 2017-08-25 NOTE — PROGRESS NOTE ADULT - PROVIDER SPECIALTY LIST ADULT
CT Surgery
Cardiology
Critical Care
Nephrology
Cardiology
Nephrology
Nephrology
CT Surgery

## 2017-08-25 NOTE — PROGRESS NOTE ADULT - SUBJECTIVE AND OBJECTIVE BOX
No further  Afib.  Continues to complain of exertional dyspnea and overall fatigue.  Had some chest discomfort last night 10 minutes after ambulating to bathroom.    Started prednisone 15 mg yesterday    WBC normal today        REVIEW OF SYSTEMS:  CARDIOVASCULAR: No palpitations  All other review of systems is negative unless indicated above    Medications:  aspirin  chewable 81 milliGRAM(s) Oral daily  benzocaine 15 mG/menthol 3.6 mG Lozenge 1 Lozenge Oral every 3 hours PRN  famotidine    Tablet 20 milliGRAM(s) Oral daily  heparin  Injectable 5000 Unit(s) SubCutaneous every 12 hours  ALPRAZolam 0.25 milliGRAM(s) Oral three times a day PRN  dronedarone 400 milliGRAM(s) Oral two times a day  acetaminophen   Tablet. 650 milliGRAM(s) Oral every 8 hours PRN  magnesium sulfate  IVPB 2 Gram(s) IV Intermittent once  metoprolol 25 milliGRAM(s) Oral two times a day  predniSONE   Tablet 15 milliGRAM(s) Oral daily      Physical Exam:  Vitals:  T(C): 36.8 (17 @ 04:18), Max: 37 (17 @ 20:02)  HR: 90 (17 @ 04:18) (73 - 90)  BP: 116/66 (17 @ 04:18) (99/65 - 116/66)  BP(mean): --  RR: 17 (17 @ 04:18) (17 - 18)  SpO2: 95% (17 @ 04:18) (95% - 98%)  Wt(kg): --  Daily     Daily Weight in k (25 Aug 2017 07:39)  I&O's Summary    24 Aug 2017 07:01  -  25 Aug 2017 07:00  --------------------------------------------------------  IN: 1160 mL / OUT: 501 mL / NET: 659 mL        Appearance:  Normal, NAD  Eyes:  PERRL, EOMI  HEENT: Normal oral [place], NC/AT  Neck:  No JVD or HJR  Respiratory: Clear to auscultation R, decreased BS  left base  Cardiovascular: Normal S1 and S2 with soft systolic murmur left sternal border.  No rubs or gallops  Abdomen:   BS normal, Soft,  Non-tender without organomegally or masses  Extremities: Trace bilateral leg edema            Complete Blood Count in AM (17 @ 06:28)    WBC Count: 9.0 K/uL    RBC Count: 2.69 M/uL    Hemoglobin: 9.2 g/dL    Hematocrit: 26.7 %    Mean Cell Volume: 99.3 fl    Mean Cell Hemoglobin: 34.4 pg    Mean Cell Hemoglobin Conc: 34.6 gm/dL    Red Cell Distrib Width: 14.2 %    Platelet Count - Automated: 267 K/uL        137  |  100  |  41<H>  ----------------------------<  104<H>  4.4   |  21<L>  |  2.05<H>    Ca    8.7      25 Aug 2017 06:28    TPro  5.8<L>  /  Alb  3.1<L>  /  TBili  0.6  /  DBili  0.2  /  AST  16  /  ALT  29  /  AlkPhos  95

## 2017-08-25 NOTE — PROGRESS NOTE ADULT - ASSESSMENT
Imp:  83y Female with CKD 3  s/p AVR  afib/flutter  pleural effusion  renal function essentially no change/stable  continue postop care per cv/cts  trending lytes and cr in this setting.

## 2017-08-28 RX ORDER — ACETYLCYSTEINE 200 MG/ML
20 SOLUTION ORAL; RESPIRATORY (INHALATION)
Qty: 30 | Refills: 0 | Status: DISCONTINUED | COMMUNITY
Start: 2017-05-24

## 2017-10-04 ENCOUNTER — APPOINTMENT (OUTPATIENT)
Dept: CARDIOTHORACIC SURGERY | Facility: CLINIC | Age: 82
End: 2017-10-04

## 2017-10-04 VITALS
TEMPERATURE: 98 F | SYSTOLIC BLOOD PRESSURE: 128 MMHG | DIASTOLIC BLOOD PRESSURE: 75 MMHG | HEART RATE: 74 BPM | BODY MASS INDEX: 19.97 KG/M2 | HEIGHT: 64 IN | OXYGEN SATURATION: 98 % | WEIGHT: 117 LBS | RESPIRATION RATE: 15 BRPM

## 2017-10-04 DIAGNOSIS — Z95.2 PRESENCE OF PROSTHETIC HEART VALVE: ICD-10-CM

## 2017-10-04 RX ORDER — ALPRAZOLAM 0.25 MG/1
0.25 TABLET ORAL
Refills: 0 | Status: DISCONTINUED | COMMUNITY
End: 2017-10-04

## 2017-10-04 RX ORDER — CHOLECALCIFEROL (VITAMIN D3) 50 MCG
500 CAPSULE ORAL DAILY
Refills: 0 | Status: ACTIVE | COMMUNITY

## 2017-10-04 RX ORDER — PREDNISONE 50 MG/1
TABLET ORAL
Refills: 0 | Status: DISCONTINUED | COMMUNITY
End: 2017-10-04

## 2017-10-04 RX ORDER — METOPROLOL TARTRATE 50 MG/1
50 TABLET, FILM COATED ORAL
Qty: 60 | Refills: 0 | Status: DISCONTINUED | COMMUNITY
Start: 2017-09-26 | End: 2017-10-04

## 2017-10-19 PROCEDURE — 82803 BLOOD GASES ANY COMBINATION: CPT

## 2017-10-19 PROCEDURE — 86803 HEPATITIS C AB TEST: CPT

## 2017-10-19 PROCEDURE — 82553 CREATINE MB FRACTION: CPT

## 2017-10-19 PROCEDURE — 86901 BLOOD TYPING SEROLOGIC RH(D): CPT

## 2017-10-19 PROCEDURE — 82565 ASSAY OF CREATININE: CPT

## 2017-10-19 PROCEDURE — P9045: CPT

## 2017-10-19 PROCEDURE — 88305 TISSUE EXAM BY PATHOLOGIST: CPT

## 2017-10-19 PROCEDURE — 86900 BLOOD TYPING SEROLOGIC ABO: CPT

## 2017-10-19 PROCEDURE — 85610 PROTHROMBIN TIME: CPT

## 2017-10-19 PROCEDURE — 85027 COMPLETE CBC AUTOMATED: CPT

## 2017-10-19 PROCEDURE — 85652 RBC SED RATE AUTOMATED: CPT

## 2017-10-19 PROCEDURE — 80076 HEPATIC FUNCTION PANEL: CPT

## 2017-10-19 PROCEDURE — 97162 PT EVAL MOD COMPLEX 30 MIN: CPT

## 2017-10-19 PROCEDURE — 85384 FIBRINOGEN ACTIVITY: CPT

## 2017-10-19 PROCEDURE — 71045 X-RAY EXAM CHEST 1 VIEW: CPT

## 2017-10-19 PROCEDURE — C1751: CPT

## 2017-10-19 PROCEDURE — 93308 TTE F-UP OR LMTD: CPT

## 2017-10-19 PROCEDURE — 71250 CT THORAX DX C-: CPT

## 2017-10-19 PROCEDURE — 80048 BASIC METABOLIC PNL TOTAL CA: CPT

## 2017-10-19 PROCEDURE — 93321 DOPPLER ECHO F-UP/LMTD STD: CPT

## 2017-10-19 PROCEDURE — 85014 HEMATOCRIT: CPT

## 2017-10-19 PROCEDURE — C1769: CPT

## 2017-10-19 PROCEDURE — 80074 ACUTE HEPATITIS PANEL: CPT

## 2017-10-19 PROCEDURE — 82947 ASSAY GLUCOSE BLOOD QUANT: CPT

## 2017-10-19 PROCEDURE — P9047: CPT

## 2017-10-19 PROCEDURE — 93005 ELECTROCARDIOGRAM TRACING: CPT

## 2017-10-19 PROCEDURE — 86923 COMPATIBILITY TEST ELECTRIC: CPT

## 2017-10-19 PROCEDURE — 82550 ASSAY OF CK (CPK): CPT

## 2017-10-19 PROCEDURE — 83605 ASSAY OF LACTIC ACID: CPT

## 2017-10-19 PROCEDURE — 82435 ASSAY OF BLOOD CHLORIDE: CPT

## 2017-10-19 PROCEDURE — 86891 AUTOLOGOUS BLOOD OP SALVAGE: CPT

## 2017-10-19 PROCEDURE — 84295 ASSAY OF SERUM SODIUM: CPT

## 2017-10-19 PROCEDURE — P9016: CPT

## 2017-10-19 PROCEDURE — 97116 GAIT TRAINING THERAPY: CPT

## 2017-10-19 PROCEDURE — 97530 THERAPEUTIC ACTIVITIES: CPT

## 2017-10-19 PROCEDURE — 85730 THROMBOPLASTIN TIME PARTIAL: CPT

## 2017-10-19 PROCEDURE — 86140 C-REACTIVE PROTEIN: CPT

## 2017-10-19 PROCEDURE — 80053 COMPREHEN METABOLIC PANEL: CPT

## 2017-10-19 PROCEDURE — 84132 ASSAY OF SERUM POTASSIUM: CPT

## 2017-10-19 PROCEDURE — 86703 HIV-1/HIV-2 1 RESULT ANTBDY: CPT

## 2017-10-19 PROCEDURE — 84484 ASSAY OF TROPONIN QUANT: CPT

## 2017-10-19 PROCEDURE — 83735 ASSAY OF MAGNESIUM: CPT

## 2017-10-19 PROCEDURE — C1889: CPT

## 2017-10-19 PROCEDURE — 84443 ASSAY THYROID STIM HORMONE: CPT

## 2017-10-19 PROCEDURE — 31720 CLEARANCE OF AIRWAYS: CPT

## 2017-10-19 PROCEDURE — 94002 VENT MGMT INPAT INIT DAY: CPT

## 2017-10-19 PROCEDURE — 36430 TRANSFUSION BLD/BLD COMPNT: CPT

## 2017-10-19 PROCEDURE — 84100 ASSAY OF PHOSPHORUS: CPT

## 2017-10-19 PROCEDURE — 82330 ASSAY OF CALCIUM: CPT

## 2017-11-21 ENCOUNTER — APPOINTMENT (OUTPATIENT)
Dept: PAIN MANAGEMENT | Facility: CLINIC | Age: 82
End: 2017-11-21
Payer: MEDICARE

## 2017-11-21 VITALS
HEIGHT: 64 IN | SYSTOLIC BLOOD PRESSURE: 130 MMHG | WEIGHT: 118 LBS | HEART RATE: 74 BPM | DIASTOLIC BLOOD PRESSURE: 70 MMHG | BODY MASS INDEX: 20.14 KG/M2

## 2017-11-21 DIAGNOSIS — R26.81 UNSTEADINESS ON FEET: ICD-10-CM

## 2017-11-21 PROCEDURE — 99214 OFFICE O/P EST MOD 30 MIN: CPT

## 2018-01-22 ENCOUNTER — APPOINTMENT (OUTPATIENT)
Dept: PAIN MANAGEMENT | Facility: CLINIC | Age: 83
End: 2018-01-22

## 2018-02-01 ENCOUNTER — MESSAGE (OUTPATIENT)
Age: 83
End: 2018-02-01

## 2018-02-01 ENCOUNTER — APPOINTMENT (OUTPATIENT)
Dept: PAIN MANAGEMENT | Facility: CLINIC | Age: 83
End: 2018-02-01
Payer: MEDICARE

## 2018-02-01 VITALS — DIASTOLIC BLOOD PRESSURE: 79 MMHG | SYSTOLIC BLOOD PRESSURE: 176 MMHG | HEART RATE: 70 BPM

## 2018-02-01 VITALS
HEIGHT: 64 IN | WEIGHT: 117 LBS | HEART RATE: 72 BPM | DIASTOLIC BLOOD PRESSURE: 81 MMHG | SYSTOLIC BLOOD PRESSURE: 179 MMHG | BODY MASS INDEX: 19.97 KG/M2

## 2018-02-01 PROCEDURE — 99214 OFFICE O/P EST MOD 30 MIN: CPT

## 2018-02-20 ENCOUNTER — CHART COPY (OUTPATIENT)
Age: 83
End: 2018-02-20

## 2018-03-26 ENCOUNTER — APPOINTMENT (OUTPATIENT)
Dept: NEUROLOGY | Facility: CLINIC | Age: 83
End: 2018-03-26
Payer: MEDICARE

## 2018-03-26 VITALS
BODY MASS INDEX: 20.49 KG/M2 | HEART RATE: 74 BPM | DIASTOLIC BLOOD PRESSURE: 70 MMHG | HEIGHT: 64 IN | SYSTOLIC BLOOD PRESSURE: 141 MMHG | WEIGHT: 120 LBS

## 2018-03-26 PROCEDURE — 99215 OFFICE O/P EST HI 40 MIN: CPT

## 2018-03-26 RX ORDER — DRONEDARONE 400 MG/1
400 TABLET, FILM COATED ORAL
Qty: 60 | Refills: 0 | Status: DISCONTINUED | COMMUNITY
Start: 2017-09-26 | End: 2018-03-26

## 2018-03-26 RX ORDER — FAMOTIDINE 20 MG/1
20 TABLET, FILM COATED ORAL
Qty: 30 | Refills: 0 | Status: DISCONTINUED | COMMUNITY
Start: 2017-09-26 | End: 2018-03-26

## 2018-03-26 RX ORDER — WARFARIN 2 MG/1
2 TABLET ORAL
Qty: 7 | Refills: 0 | Status: DISCONTINUED | COMMUNITY
Start: 2017-09-26 | End: 2018-03-26

## 2018-03-26 RX ORDER — ASPIRIN 81 MG
81 TABLET, DELAYED RELEASE (ENTERIC COATED) ORAL DAILY
Refills: 0 | Status: DISCONTINUED | COMMUNITY
End: 2018-03-26

## 2018-04-04 ENCOUNTER — OTHER (OUTPATIENT)
Age: 83
End: 2018-04-04

## 2018-04-17 ENCOUNTER — FORM ENCOUNTER (OUTPATIENT)
Age: 83
End: 2018-04-17

## 2018-04-18 ENCOUNTER — APPOINTMENT (OUTPATIENT)
Dept: MRI IMAGING | Facility: CLINIC | Age: 83
End: 2018-04-18
Payer: MEDICARE

## 2018-04-18 ENCOUNTER — OUTPATIENT (OUTPATIENT)
Dept: OUTPATIENT SERVICES | Facility: HOSPITAL | Age: 83
LOS: 1 days | End: 2018-04-18
Payer: MEDICARE

## 2018-04-18 DIAGNOSIS — I67.1 CEREBRAL ANEURYSM, NONRUPTURED: ICD-10-CM

## 2018-04-18 DIAGNOSIS — Z98.89 OTHER SPECIFIED POSTPROCEDURAL STATES: Chronic | ICD-10-CM

## 2018-04-18 DIAGNOSIS — Z98.890 OTHER SPECIFIED POSTPROCEDURAL STATES: Chronic | ICD-10-CM

## 2018-04-18 DIAGNOSIS — I60.9 NONTRAUMATIC SUBARACHNOID HEMORRHAGE, UNSPECIFIED: ICD-10-CM

## 2018-04-18 DIAGNOSIS — Z98.49 CATARACT EXTRACTION STATUS, UNSPECIFIED EYE: Chronic | ICD-10-CM

## 2018-04-18 PROCEDURE — 70551 MRI BRAIN STEM W/O DYE: CPT | Mod: 26

## 2018-04-18 PROCEDURE — 70547 MR ANGIOGRAPHY NECK W/O DYE: CPT | Mod: 26

## 2018-04-18 PROCEDURE — 70551 MRI BRAIN STEM W/O DYE: CPT

## 2018-04-18 PROCEDURE — 70544 MR ANGIOGRAPHY HEAD W/O DYE: CPT

## 2018-04-18 PROCEDURE — 70547 MR ANGIOGRAPHY NECK W/O DYE: CPT

## 2018-05-03 ENCOUNTER — APPOINTMENT (OUTPATIENT)
Dept: PAIN MANAGEMENT | Facility: CLINIC | Age: 83
End: 2018-05-03
Payer: MEDICARE

## 2018-05-03 ENCOUNTER — APPOINTMENT (OUTPATIENT)
Dept: NEUROLOGY | Facility: CLINIC | Age: 83
End: 2018-05-03
Payer: MEDICARE

## 2018-05-03 VITALS
BODY MASS INDEX: 20.49 KG/M2 | WEIGHT: 120 LBS | DIASTOLIC BLOOD PRESSURE: 70 MMHG | SYSTOLIC BLOOD PRESSURE: 140 MMHG | HEIGHT: 64 IN | HEART RATE: 76 BPM

## 2018-05-03 VITALS
DIASTOLIC BLOOD PRESSURE: 70 MMHG | SYSTOLIC BLOOD PRESSURE: 140 MMHG | WEIGHT: 120 LBS | HEART RATE: 76 BPM | BODY MASS INDEX: 20.49 KG/M2 | HEIGHT: 64 IN

## 2018-05-03 DIAGNOSIS — G43.909 MIGRAINE, UNSPECIFIED, NOT INTRACTABLE, W/OUT STATUS MIGRAINOSUS: ICD-10-CM

## 2018-05-03 DIAGNOSIS — G47.33 OBSTRUCTIVE SLEEP APNEA (ADULT) (PEDIATRIC): ICD-10-CM

## 2018-05-03 PROCEDURE — 99215 OFFICE O/P EST HI 40 MIN: CPT

## 2018-05-03 PROCEDURE — 99214 OFFICE O/P EST MOD 30 MIN: CPT

## 2018-05-13 PROBLEM — G43.909 MIGRAINE: Status: ACTIVE | Noted: 2017-03-31

## 2018-06-14 ENCOUNTER — APPOINTMENT (OUTPATIENT)
Dept: OBGYN | Facility: CLINIC | Age: 83
End: 2018-06-14

## 2018-06-18 ENCOUNTER — APPOINTMENT (OUTPATIENT)
Dept: OBGYN | Facility: CLINIC | Age: 83
End: 2018-06-18
Payer: MEDICARE

## 2018-06-18 VITALS
DIASTOLIC BLOOD PRESSURE: 90 MMHG | HEART RATE: 73 BPM | HEIGHT: 64 IN | BODY MASS INDEX: 21.17 KG/M2 | SYSTOLIC BLOOD PRESSURE: 166 MMHG | WEIGHT: 124 LBS

## 2018-06-18 VITALS — DIASTOLIC BLOOD PRESSURE: 77 MMHG | SYSTOLIC BLOOD PRESSURE: 146 MMHG | HEART RATE: 78 BPM

## 2018-06-18 DIAGNOSIS — Z01.419 ENCOUNTER FOR GYNECOLOGICAL EXAMINATION (GENERAL) (ROUTINE) W/OUT ABNORMAL FINDINGS: ICD-10-CM

## 2018-06-18 DIAGNOSIS — N76.2 ACUTE VULVITIS: ICD-10-CM

## 2018-06-18 DIAGNOSIS — N95.2 POSTMENOPAUSAL ATROPHIC VAGINITIS: ICD-10-CM

## 2018-06-18 DIAGNOSIS — R14.0 ABDOMINAL DISTENSION (GASEOUS): ICD-10-CM

## 2018-06-18 PROCEDURE — G0101: CPT

## 2018-06-26 LAB — BACTERIA UR CULT: NORMAL

## 2018-07-06 ENCOUNTER — OTHER (OUTPATIENT)
Age: 83
End: 2018-07-06

## 2018-11-06 ENCOUNTER — APPOINTMENT (OUTPATIENT)
Dept: NEUROLOGY | Facility: CLINIC | Age: 83
End: 2018-11-06
Payer: MEDICARE

## 2018-11-06 VITALS
HEIGHT: 64 IN | SYSTOLIC BLOOD PRESSURE: 110 MMHG | WEIGHT: 122 LBS | DIASTOLIC BLOOD PRESSURE: 70 MMHG | BODY MASS INDEX: 20.83 KG/M2 | HEART RATE: 72 BPM

## 2018-11-06 PROBLEM — M41.9 SCOLIOSIS, UNSPECIFIED: Chronic | Status: ACTIVE | Noted: 2017-08-08

## 2018-11-06 PROBLEM — I35.0 NONRHEUMATIC AORTIC (VALVE) STENOSIS: Chronic | Status: ACTIVE | Noted: 2017-08-08

## 2018-11-06 PROBLEM — R00.2 PALPITATIONS: Chronic | Status: ACTIVE | Noted: 2017-06-13

## 2018-11-06 PROCEDURE — 99215 OFFICE O/P EST HI 40 MIN: CPT

## 2018-11-06 RX ORDER — HALOBETASOL PROPIONATE 0.5 MG/G
0.05 CREAM TOPICAL TWICE DAILY
Qty: 15 | Refills: 2 | Status: DISCONTINUED | COMMUNITY
Start: 2018-06-21 | End: 2018-11-06

## 2018-11-06 RX ORDER — OMEGA-3/DHA/EPA/FISH OIL 35-113.5MG
1000 TABLET,CHEWABLE ORAL DAILY
Refills: 0 | Status: DISCONTINUED | COMMUNITY
End: 2018-11-06

## 2018-11-06 RX ORDER — ACETAMINOPHEN 325 MG/1
325 TABLET, FILM COATED ORAL
Refills: 0 | Status: DISCONTINUED | COMMUNITY
End: 2018-11-06

## 2018-11-06 RX ORDER — CLOBETASOL PROPIONATE 0.5 MG/G
0.05 CREAM TOPICAL
Qty: 1 | Refills: 1 | Status: DISCONTINUED | COMMUNITY
Start: 2018-06-18 | End: 2018-11-06

## 2018-11-07 ENCOUNTER — OTHER (OUTPATIENT)
Age: 83
End: 2018-11-07

## 2019-02-01 ENCOUNTER — OUTPATIENT (OUTPATIENT)
Dept: OUTPATIENT SERVICES | Facility: HOSPITAL | Age: 84
LOS: 1 days | End: 2019-02-01
Payer: MEDICARE

## 2019-02-01 ENCOUNTER — APPOINTMENT (OUTPATIENT)
Dept: ULTRASOUND IMAGING | Facility: CLINIC | Age: 84
End: 2019-02-01
Payer: MEDICARE

## 2019-02-01 ENCOUNTER — APPOINTMENT (OUTPATIENT)
Dept: CT IMAGING | Facility: CLINIC | Age: 84
End: 2019-02-01
Payer: MEDICARE

## 2019-02-01 DIAGNOSIS — Z00.8 ENCOUNTER FOR OTHER GENERAL EXAMINATION: ICD-10-CM

## 2019-02-01 DIAGNOSIS — Z98.89 OTHER SPECIFIED POSTPROCEDURAL STATES: Chronic | ICD-10-CM

## 2019-02-01 DIAGNOSIS — Z98.890 OTHER SPECIFIED POSTPROCEDURAL STATES: Chronic | ICD-10-CM

## 2019-02-01 DIAGNOSIS — Z98.49 CATARACT EXTRACTION STATUS, UNSPECIFIED EYE: Chronic | ICD-10-CM

## 2019-02-01 PROCEDURE — 93970 EXTREMITY STUDY: CPT | Mod: 26

## 2019-02-01 PROCEDURE — 71250 CT THORAX DX C-: CPT

## 2019-02-01 PROCEDURE — 71250 CT THORAX DX C-: CPT | Mod: 26

## 2019-02-01 PROCEDURE — 93970 EXTREMITY STUDY: CPT

## 2019-04-29 ENCOUNTER — FORM ENCOUNTER (OUTPATIENT)
Age: 84
End: 2019-04-29

## 2019-04-30 ENCOUNTER — APPOINTMENT (OUTPATIENT)
Dept: MRI IMAGING | Facility: CLINIC | Age: 84
End: 2019-04-30
Payer: MEDICARE

## 2019-04-30 ENCOUNTER — OUTPATIENT (OUTPATIENT)
Dept: OUTPATIENT SERVICES | Facility: HOSPITAL | Age: 84
LOS: 1 days | End: 2019-04-30
Payer: MEDICARE

## 2019-04-30 DIAGNOSIS — Z00.8 ENCOUNTER FOR OTHER GENERAL EXAMINATION: ICD-10-CM

## 2019-04-30 DIAGNOSIS — Z98.890 OTHER SPECIFIED POSTPROCEDURAL STATES: Chronic | ICD-10-CM

## 2019-04-30 DIAGNOSIS — Z98.89 OTHER SPECIFIED POSTPROCEDURAL STATES: Chronic | ICD-10-CM

## 2019-04-30 DIAGNOSIS — Z98.49 CATARACT EXTRACTION STATUS, UNSPECIFIED EYE: Chronic | ICD-10-CM

## 2019-04-30 PROCEDURE — 70551 MRI BRAIN STEM W/O DYE: CPT | Mod: 26

## 2019-04-30 PROCEDURE — 70544 MR ANGIOGRAPHY HEAD W/O DYE: CPT

## 2019-04-30 PROCEDURE — 70547 MR ANGIOGRAPHY NECK W/O DYE: CPT

## 2019-04-30 PROCEDURE — 70547 MR ANGIOGRAPHY NECK W/O DYE: CPT | Mod: 26

## 2019-04-30 PROCEDURE — 70544 MR ANGIOGRAPHY HEAD W/O DYE: CPT | Mod: 26,59

## 2019-04-30 PROCEDURE — 70551 MRI BRAIN STEM W/O DYE: CPT

## 2019-05-06 ENCOUNTER — APPOINTMENT (OUTPATIENT)
Dept: NEUROLOGY | Facility: CLINIC | Age: 84
End: 2019-05-06

## 2019-05-20 ENCOUNTER — APPOINTMENT (OUTPATIENT)
Dept: ULTRASOUND IMAGING | Facility: CLINIC | Age: 84
End: 2019-05-20

## 2019-05-24 ENCOUNTER — APPOINTMENT (OUTPATIENT)
Dept: ULTRASOUND IMAGING | Facility: CLINIC | Age: 84
End: 2019-05-24
Payer: MEDICARE

## 2019-05-24 ENCOUNTER — OUTPATIENT (OUTPATIENT)
Dept: OUTPATIENT SERVICES | Facility: HOSPITAL | Age: 84
LOS: 1 days | End: 2019-05-24
Payer: MEDICARE

## 2019-05-24 DIAGNOSIS — Z98.49 CATARACT EXTRACTION STATUS, UNSPECIFIED EYE: Chronic | ICD-10-CM

## 2019-05-24 DIAGNOSIS — Z00.8 ENCOUNTER FOR OTHER GENERAL EXAMINATION: ICD-10-CM

## 2019-05-24 DIAGNOSIS — Z98.890 OTHER SPECIFIED POSTPROCEDURAL STATES: Chronic | ICD-10-CM

## 2019-05-24 DIAGNOSIS — Z98.89 OTHER SPECIFIED POSTPROCEDURAL STATES: Chronic | ICD-10-CM

## 2019-05-24 PROCEDURE — 76856 US EXAM PELVIC COMPLETE: CPT

## 2019-05-24 PROCEDURE — 76856 US EXAM PELVIC COMPLETE: CPT | Mod: 26

## 2019-05-24 PROCEDURE — 76700 US EXAM ABDOM COMPLETE: CPT | Mod: 26

## 2019-05-24 PROCEDURE — 76700 US EXAM ABDOM COMPLETE: CPT
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
## 2019-07-11 ENCOUNTER — OTHER (OUTPATIENT)
Age: 84
End: 2019-07-11

## 2019-07-21 ENCOUNTER — APPOINTMENT (OUTPATIENT)
Dept: MRI IMAGING | Facility: CLINIC | Age: 84
End: 2019-07-21
Payer: MEDICARE

## 2019-07-21 ENCOUNTER — OUTPATIENT (OUTPATIENT)
Dept: OUTPATIENT SERVICES | Facility: HOSPITAL | Age: 84
LOS: 1 days | End: 2019-07-21
Payer: MEDICARE

## 2019-07-21 DIAGNOSIS — Z98.890 OTHER SPECIFIED POSTPROCEDURAL STATES: Chronic | ICD-10-CM

## 2019-07-21 DIAGNOSIS — Z00.8 ENCOUNTER FOR OTHER GENERAL EXAMINATION: ICD-10-CM

## 2019-07-21 DIAGNOSIS — Z98.89 OTHER SPECIFIED POSTPROCEDURAL STATES: Chronic | ICD-10-CM

## 2019-07-21 DIAGNOSIS — Z98.49 CATARACT EXTRACTION STATUS, UNSPECIFIED EYE: Chronic | ICD-10-CM

## 2019-07-21 PROCEDURE — 70540 MRI ORBIT/FACE/NECK W/O DYE: CPT | Mod: 26

## 2019-07-21 PROCEDURE — 72148 MRI LUMBAR SPINE W/O DYE: CPT | Mod: 26

## 2019-07-21 PROCEDURE — 72148 MRI LUMBAR SPINE W/O DYE: CPT

## 2019-07-21 PROCEDURE — 70540 MRI ORBIT/FACE/NECK W/O DYE: CPT

## 2019-08-12 NOTE — PROCEDURE NOTE - NSTOLERANCE_GEN_A_CORE
Problem: At Risk for Falls  Goal: # Patient does not fall  Outcome: Outcome Met, Continue evaluating goal progress toward completion  Patient calls appropriately and participates in fall interventions. Nonskid socks on. Bed/chair alarm on. Will continue to monitor.        Patient tolerated procedure well.

## 2019-10-04 ENCOUNTER — CLINICAL ADVICE (OUTPATIENT)
Age: 84
End: 2019-10-04

## 2019-10-05 ENCOUNTER — RECORD ABSTRACTING (OUTPATIENT)
Age: 84
End: 2019-10-05

## 2019-10-24 ENCOUNTER — APPOINTMENT (OUTPATIENT)
Dept: NEUROLOGY | Facility: CLINIC | Age: 84
End: 2019-10-24
Payer: MEDICARE

## 2019-10-24 VITALS — HEIGHT: 63 IN | BODY MASS INDEX: 21.26 KG/M2 | WEIGHT: 120 LBS

## 2019-10-24 VITALS
BODY MASS INDEX: 21.26 KG/M2 | SYSTOLIC BLOOD PRESSURE: 110 MMHG | DIASTOLIC BLOOD PRESSURE: 60 MMHG | HEIGHT: 63 IN | WEIGHT: 120 LBS

## 2019-10-24 PROCEDURE — 99215 OFFICE O/P EST HI 40 MIN: CPT

## 2019-10-24 RX ORDER — UBIDECARENONE 100 MG
100 CAPSULE ORAL TWICE DAILY
Refills: 0 | Status: DISCONTINUED | COMMUNITY
End: 2019-10-24

## 2019-10-24 RX ORDER — ANTIARTHRITIC COMBINATION NO.2 900 MG
5000 TABLET ORAL DAILY
Refills: 0 | Status: DISCONTINUED | COMMUNITY
End: 2019-10-24

## 2019-10-24 NOTE — REVIEW OF SYSTEMS
[Dysuria] : dysuria [Negative] : Heme/Lymph [FreeTextEntry8] : dysuria due to vaginal dryness [de-identified] : "reactive hypoglycemia"

## 2019-10-24 NOTE — REASON FOR VISIT
[FreeTextEntry1] : history of convexity subarachnoid hemorrhage; incidental cavernous carotid aneurysm

## 2019-10-24 NOTE — HISTORY OF PRESENT ILLNESS
[FreeTextEntry1] : 85-year-old ambidextrous white lady \par Summary from  5/3/18\par She Came for an opinion regarding a right cavernous ICA aneurysm.\par \par Summary from Dr. 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 In 1/9/18, she was admitted to the hospital with urosepsis leading to E Coli bacteremia. She underwent work up including  for possible infective endocarditis. She was treated with ABx for total of 2 weeks. During the work up, she had CT brain performed due to unknown reasons. She denies any significant HAs at that time but reported to be confused/disoriented at that time due to infection/sepsis. She denies any significant head injury before her SAH but reports to have head injury about 2 weeks ago without any LOC. She reports to be taking warfarin for 3 months after the AVR surgery. Of note, she also reports to have history of migraines on Zomig.\par \par CT brain (1/10/18 12:12 AM): Subarachnoid hemorrhage noted in left high frontal and left fronto-parietal region\par \par MRA head (1/13/18): Small 2-3 mm aneurysm noted involving right cavernous left supraclinoid ICA.\par MRI brain (1/18): No evidence of acute infarct, previously noted subarachnoid hemorrhage showed complete resolution, an isolated micro-hemorrhage noted in the right high frontal region involving the cortex/juxta-cortical region\par \par 5/3/18. She came to the office today. She reports no new focal neurologic symptoms. She was told that she does not need to take aspirin from the standpoint of her bovine AVR\par \par Repeat MRI brain (4/18/18) to my eye  showed a small focus of susceptibility in the high right parietal cortex, consistent with either a chronic microhemorrhage, mineralization or a small cavernoma.\par Repeat MRA neck and head (4/18/18) showed a right cavernous carotid aneurysm, measured at 3.4 mm.\par \par 10/24/19. She Came to the Office Today. She has chronic, mild postural instability. No new focal neurologic symptoms.\par \par Repeat MRI brain (4/30/19) was unremarkable, showing bifrontal subdural hygromas and a small posterior fossa arachnoid cyst.\par Repeat MRA neck and head (4/30/19) showed stability of the right cavernous carotid aneurysm, measured at 3.6 x 2.1 x 2.9 mm.\par \par

## 2019-10-24 NOTE — DISCUSSION/SUMMARY
[FreeTextEntry1] : Summary from Dr. Temple of note of 3/26/18.\par  In 8/17, she underwent aortic valve replacement with bioprosthetic valve. In 1/18, she was admitted to OSH with urosepsis (E Coli). She reports to be confused/disoriented and febrile at the time of admission to OSH CT brain on 1/10/18  showed evidence of acute subarachnoid hemorrhage in left high frontal and left fronto-parietal region. MRI brain performed subsequently did not show any evidence of acute infarct and previously noted subarachnoid hemorrhage showed complete resolution; although incidentally showed an isolated micro-hemorrhage noted in the right high frontal region involving the cortex/juxta-cortical region. MRA head on 1/13/18 showed a small 3 mm aneurysm of the right cavernous/supraclinoid ICA  Of note, transesophageal echocardiogram did not show any evidence of vegetations on heart valves. \par \par Impression:\par 1. Nontraumatic left hemispheric (left high frontal and left frontal parietal region) subarachnoid hemorrhage of undetermined etiology; differential diagnoses include possible cerebral amyloid angiopathy, cortical vein thrombosis less likely cerebral venous sinus thrombosis, vascular malformation like distal aneurysm, reversible cerebral vasoconstriction syndrome etc.\par 2. 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 She has mild postural instability, probably multifactorial, and otherwise she is neurologically intact.\par \par I suspect that her previous left-sided convexity subarachnoid hemorrhage was most likely due to amyloid angiopathy.\par She has an incidental, unruptured small right cavernous carotid aneurysm. At that size and in that location, this aneurysm is of minimal risk to her. There is no role for treatment of this aneurysm. She should continue her usual activities without any restrictions, and should not worry about the aneurysm. As a precaution, she could have repeat MRA in one year followed by an office visit with Dr. Temple.\par \par 10/24/19. Overall she is neurologically stable and doing well. She has chronic, mild postural instability, probably multifactorial. For this, I have given her a prescription for physiotherapy.\par \par She should have repeat MRA brain in approximately 6 months to monitor her right cavernous carotid aneurysm for stability. \par \par She can followup with me in approximately 6 months. I hope that she remains free of serious trouble.\par

## 2019-11-25 ENCOUNTER — CLINICAL ADVICE (OUTPATIENT)
Age: 84
End: 2019-11-25

## 2019-11-26 ENCOUNTER — APPOINTMENT (OUTPATIENT)
Dept: NEUROLOGY | Facility: CLINIC | Age: 84
End: 2019-11-26
Payer: MEDICARE

## 2019-11-26 DIAGNOSIS — R25.1 TREMOR, UNSPECIFIED: ICD-10-CM

## 2019-11-26 PROCEDURE — 99215 OFFICE O/P EST HI 40 MIN: CPT

## 2019-11-27 NOTE — PHYSICAL EXAM
[Person] : oriented to person [Place] : oriented to place [Time] : oriented to time [Concentration Intact] : normal concentrating ability [Comprehension] : comprehension intact [Cranial Nerves Oculomotor (III)] : extraocular motion intact [Cranial Nerves Trigeminal (V)] : facial sensation intact symmetrically [Cranial Nerves Facial (VII)] : face symmetrical [Cranial Nerves Vestibulocochlear (VIII)] : hearing was intact bilaterally [Cranial Nerves Glossopharyngeal (IX)] : tongue and palate midline [Cranial Nerves Accessory (XI - Cranial And Spinal)] : head turning and shoulder shrug symmetric [Cranial Nerves Hypoglossal (XII)] : there was no tongue deviation with protrusion [Motor Strength] : muscle strength was normal in all four extremities [Sensation Tactile Decrease] : light touch was intact [2+] : Ankle jerk left 2+ [General Appearance - Alert] : alert [General Appearance - In No Acute Distress] : in no acute distress [Oriented To Time, Place, And Person] : oriented to person, place, and time [Impaired Insight] : insight and judgment were intact [Affect] : the affect was normal [Motor Strength Upper Extremities Bilaterally] : strength was normal in both upper extremities [Motor Strength Lower Extremities Bilaterally] : strength was normal in both lower extremities [Coordination - Dysmetria Impaired Finger-to-Nose Bilateral] : not present [Extraocular Movements] : extraocular movements were intact [Hearing Threshold Finger Rub Not [place]] : hearing was normal [Neck Cervical Mass (___cm)] : no neck mass was observed [Edema] : there was no peripheral edema [Abdomen Soft] : soft [FreeTextEntry1] : scoliosis [Nail Clubbing] : no clubbing  or cyanosis of the fingernails [] : no rash

## 2019-11-27 NOTE — DISCUSSION/SUMMARY
[FreeTextEntry1] : Jacqueline Aragon is an 85 year old ambidextrous F with a history of Migraines and non traumatic SAH. Aortic valve replacement in 2017 who presents for initial evaluation of bilateral hand tremors for a little less than a year, worse on the left. Her examination is significant for bilateral resting hand tremors, mild rigidity of the left wrist with activation, and short stride, with mild postural instability. There are postural tremors bilaterally, without delay, and intention tremor bilaterally, all worse on the left. No constipation, no RBD symptoms, no anosmia.\par \par Impression: Essential tremor with mild Parkinsonian features. At this time, her essential tremors are most impairing. Will continue to monitor her mild parkinsonian features of left sided rigidity and mild left sided resting tremor. \par \par Recommendations:\par - Consider switching from metoprolol to propranolol. Cardiologist Dr. Tucker Perez at [facility]. Also discuss with Endocrinologist as beta blocker can mask hypoglycemic symptoms.\par - Discussed Primidone as an option, but her walking and balance is affected, and could make it worse and limits its use. Discussed Clonazepam as an option. The patient would like to hold off for now.\par - Physical therapy referral for balance therapy\par - She is unable to get contrasted studies due to elevated creatinine. Can discuss with nephrologist.\par \par RTC 6 weeks

## 2019-11-27 NOTE — HISTORY OF PRESENT ILLNESS
[FreeTextEntry1] : Jacqueline Aragon is an 85 year old ambidextrous F with a history of Migraines and non traumatic SAH. Aortic valve replacement in 2017. She presents for initial evaluation of hand tremors.\par \par She has been getting tremors of the hands and arms that started about 6 months ago, maybe a little before. She was diagnosed with essential tremors within the last year. She has seen endocrine and nutritionist, she has reactive hypoglycemia with brain fog, starving feeling, and tremors. She was given a food plan, and it has not helped her tremors. The tremors have become worse, with the left side more affected. She saw her PCP, and recommended seeing a neurologist. Sometimes she has to hold her left hand to stop the tremors.\par \par The tremors interfere with doing everything. She had to use two hands to hold her coffee. Sometimes it is worse than others. Reaching out and taking something, and putting eye drops in her eye is difficult. No trouble with buttons or cutting food. She gets tremors when she is eating. No trouble adjusting/turning in bed. The tremors are not affected by her two cups of coffee daily. Does not drink alcohol. R handed for writing, and does everything else with her left hand.\par \par She has also developed a hoarse voice. She has trouble walking, her legs feel stiff, and she takes little steps. She is also very off balance. She does get dizziness. She gets dizziness when standing, has not lost consciousness. She was being treated for UTI and stopped Levaquin last night and she feels that her tremors were worse. \par \par Handwriting, she has stiffening of her hand, it is tremulous, her writing is smaller.\par No constipation. No urination trouble.\par No changes in her sense of smell.\par Sleep is good. Unsure if she talks in her sleep or acts out dreams.\par Mood is frustrated about the tremors.\par Memory is good.\par Swallowing is stable.\par No falls.\par No hallucinations.\par \par No history of asthma. No history of depression. \par She is not in PT.\par \par Last MRI of the brain was April 2019. Due for one in 2020.\par TSH level was checked and was high, but Free T4 was normal.\par \par Family history: unremarkable\par Social history: retired as a , lives alone

## 2020-01-14 ENCOUNTER — APPOINTMENT (OUTPATIENT)
Dept: NEUROLOGY | Facility: CLINIC | Age: 85
End: 2020-01-14

## 2020-02-18 ENCOUNTER — APPOINTMENT (OUTPATIENT)
Dept: CT IMAGING | Facility: CLINIC | Age: 85
End: 2020-02-18
Payer: MEDICARE

## 2020-02-18 ENCOUNTER — OUTPATIENT (OUTPATIENT)
Dept: OUTPATIENT SERVICES | Facility: HOSPITAL | Age: 85
LOS: 1 days | End: 2020-02-18
Payer: MEDICARE

## 2020-02-18 DIAGNOSIS — Z00.8 ENCOUNTER FOR OTHER GENERAL EXAMINATION: ICD-10-CM

## 2020-02-18 DIAGNOSIS — Z98.89 OTHER SPECIFIED POSTPROCEDURAL STATES: Chronic | ICD-10-CM

## 2020-02-18 DIAGNOSIS — Z98.890 OTHER SPECIFIED POSTPROCEDURAL STATES: Chronic | ICD-10-CM

## 2020-02-18 DIAGNOSIS — Z98.49 CATARACT EXTRACTION STATUS, UNSPECIFIED EYE: Chronic | ICD-10-CM

## 2020-02-18 PROCEDURE — 71250 CT THORAX DX C-: CPT

## 2020-02-18 PROCEDURE — 71250 CT THORAX DX C-: CPT | Mod: 26

## 2020-05-02 ENCOUNTER — RECORD ABSTRACTING (OUTPATIENT)
Age: 85
End: 2020-05-02

## 2020-05-14 ENCOUNTER — APPOINTMENT (OUTPATIENT)
Dept: NEUROLOGY | Facility: CLINIC | Age: 85
End: 2020-05-14
Payer: MEDICARE

## 2020-05-14 PROCEDURE — 99215 OFFICE O/P EST HI 40 MIN: CPT | Mod: 95

## 2020-05-14 NOTE — REVIEW OF SYSTEMS
[Shortness Of Breath] : shortness of breath [Anxiety] : anxiety [Negative] : Endocrine [FreeTextEntry6] : Occasional mild shortness of breath related to asthma [de-identified] : Anxiety related to social isolation due to COVID 19 [FreeTextEntry8] : Recent UTIs which have been treated

## 2020-05-14 NOTE — DISCUSSION/SUMMARY
[FreeTextEntry1] : Summary from Dr. Temple of note of 3/26/18.\par  In 8/17, she underwent aortic valve replacement with bioprosthetic valve. In 1/18, she was admitted to OSH with urosepsis (E Coli). She reports to be confused/disoriented and febrile at the time of admission to OSH CT brain on 1/10/18  showed evidence of acute subarachnoid hemorrhage in left high frontal and left fronto-parietal region. MRI brain performed subsequently did not show any evidence of acute infarct and previously noted subarachnoid hemorrhage showed complete resolution; although incidentally showed an isolated micro-hemorrhage in the right high frontal region involving the cortex/juxta-cortical region. MRA head on 1/13/18 showed a small 3 mm aneurysm of the right cavernous/supraclinoid ICA  Of note, transesophageal echocardiogram did not show any evidence of vegetations on heart valves. \par \par Impression:\par 1. Nontraumatic left hemispheric (left high frontal and left frontal parietal region) subarachnoid hemorrhage of undetermined etiology; differential diagnoses include possible cerebral amyloid angiopathy, cortical vein thrombosis less likely cerebral venous sinus thrombosis, distal aneurysm, reversible cerebral vasoconstriction syndrome etc.\par 2. 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 She has mild postural instability, probably multifactorial, and otherwise she is neurologically intact.\par \par I suspect that her previous left-sided convexity subarachnoid hemorrhage was most likely due to amyloid angiopathy.\par She has an incidental, unruptured small right cavernous carotid aneurysm. At that size and in that location, this aneurysm is of minimal risk to her. There is no role for treatment of this aneurysm. She should continue her usual activities without any restrictions. As a precaution, she could have repeat MRA in one year followed by an office visit with Dr. Temple.\par \par 10/24/19. Overall she is neurologically stable and doing well. She has chronic, mild postural instability, probably multifactorial. For this, I have given her a prescription for physiotherapy.\par \par She should have repeat MRA brain in approximately 6 months to monitor her right cavernous carotid aneurysm for stability.\par \par 5/14/2020.  Overall she is neurologically stable and doing well.  She awakens with headaches diagnosed as possible "hypnic headache", and almost definitely unrelated to her small intracranial aneurysm.\par \par Repeat MRA brain scheduled for 6/20.\par \par She can followup with NP Bambi Hussein in about 6 months, or with me in about 1 year. I hope that she remains free of serious trouble.\par

## 2020-05-14 NOTE — HISTORY OF PRESENT ILLNESS
[FreeTextEntry1] : 86-year-old ambidextrous white lady \par Summary from  5/3/18\par She Came for an opinion regarding a right cavernous ICA aneurysm.\par \par Summary from Dr. 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 In 1/9/18, she was admitted to the hospital with urosepsis leading to E Coli bacteremia. She underwent work up including  for possible infective endocarditis. She was treated with ABx for total of 2 weeks. During the work up, she had CT brain performed due to unknown reasons. She denies any significant HAs at that time but reported to be confused/disoriented at that time due to infection/sepsis. She denies any significant head injury before her SAH but reports to have head injury about 2 weeks ago without any LOC. She reports to be taking warfarin for 3 months after the AVR surgery. Of note, she also reports to have history of migraines on Zomig.\par \par CT brain (1/10/18 12:12 AM): Subarachnoid hemorrhage noted in left high frontal and left fronto-parietal region\par \par MRA head (1/13/18): Small 2-3 mm aneurysm noted involving right cavernous left supraclinoid ICA.\par MRI brain (1/18): No evidence of acute infarct, previously noted subarachnoid hemorrhage showed complete resolution, an isolated micro-hemorrhage noted in the right high frontal region involving the cortex/juxta-cortical region\par \par 5/3/18. She came to the office today. She reports no new focal neurologic symptoms. She was told that she does not need to take aspirin from the standpoint of her bovine AVR\par \par Repeat MRI brain (4/18/18) to my eye  showed a small focus of susceptibility in the high right parietal cortex, consistent with either a chronic microhemorrhage, mineralization or a small cavernoma.\par Repeat MRA neck and head (4/18/18) showed a right cavernous carotid aneurysm, measured at 3.4 mm.\par \par 10/24/19. She Came to the Office Today. She has chronic, mild postural instability. 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 This service was provided using telehealth (video). The patient consented to this service.\par Location of patient: Home\par Location of provider: Office\par Names of all persons participating in the telehealth service and their role in the encounter: Patient\par \par She reports anxiety related to social isolation due to COVID 19 but she is coping.  She frequently awakens with brief right-sided headaches, diagnosed by Dr. Alegria as possible "hypnic headache".  No new focal neurologic symptoms.\par \par \par \par \par

## 2020-05-21 ENCOUNTER — APPOINTMENT (OUTPATIENT)
Dept: NEUROLOGY | Facility: CLINIC | Age: 85
End: 2020-05-21
Payer: MEDICARE

## 2020-05-21 PROCEDURE — 93892 TCD EMBOLI DETECT W/O INJ: CPT

## 2020-05-21 PROCEDURE — 93880 EXTRACRANIAL BILAT STUDY: CPT

## 2020-05-21 PROCEDURE — 93886 INTRACRANIAL COMPLETE STUDY: CPT

## 2020-06-09 ENCOUNTER — OUTPATIENT (OUTPATIENT)
Dept: OUTPATIENT SERVICES | Facility: HOSPITAL | Age: 85
LOS: 1 days | End: 2020-06-09
Payer: MEDICARE

## 2020-06-09 ENCOUNTER — APPOINTMENT (OUTPATIENT)
Dept: CT IMAGING | Facility: CLINIC | Age: 85
End: 2020-06-09
Payer: MEDICARE

## 2020-06-09 DIAGNOSIS — J47.9 BRONCHIECTASIS, UNCOMPLICATED: ICD-10-CM

## 2020-06-09 DIAGNOSIS — R06.02 SHORTNESS OF BREATH: ICD-10-CM

## 2020-06-09 DIAGNOSIS — Z98.89 OTHER SPECIFIED POSTPROCEDURAL STATES: Chronic | ICD-10-CM

## 2020-06-09 DIAGNOSIS — R93.89 ABNORMAL FINDINGS ON DIAGNOSTIC IMAGING OF OTHER SPECIFIED BODY STRUCTURES: ICD-10-CM

## 2020-06-09 DIAGNOSIS — Z98.890 OTHER SPECIFIED POSTPROCEDURAL STATES: Chronic | ICD-10-CM

## 2020-06-09 DIAGNOSIS — Z98.49 CATARACT EXTRACTION STATUS, UNSPECIFIED EYE: Chronic | ICD-10-CM

## 2020-06-09 PROCEDURE — 71250 CT THORAX DX C-: CPT

## 2020-06-09 PROCEDURE — 71250 CT THORAX DX C-: CPT | Mod: 26

## 2020-06-24 ENCOUNTER — APPOINTMENT (OUTPATIENT)
Dept: MRI IMAGING | Facility: CLINIC | Age: 85
End: 2020-06-24
Payer: MEDICARE

## 2020-06-24 ENCOUNTER — OUTPATIENT (OUTPATIENT)
Dept: OUTPATIENT SERVICES | Facility: HOSPITAL | Age: 85
LOS: 1 days | End: 2020-06-24
Payer: MEDICARE

## 2020-06-24 DIAGNOSIS — Z98.890 OTHER SPECIFIED POSTPROCEDURAL STATES: Chronic | ICD-10-CM

## 2020-06-24 DIAGNOSIS — I67.1 CEREBRAL ANEURYSM, NONRUPTURED: ICD-10-CM

## 2020-06-24 DIAGNOSIS — Z98.89 OTHER SPECIFIED POSTPROCEDURAL STATES: Chronic | ICD-10-CM

## 2020-06-24 DIAGNOSIS — Z98.49 CATARACT EXTRACTION STATUS, UNSPECIFIED EYE: Chronic | ICD-10-CM

## 2020-06-24 PROCEDURE — 70544 MR ANGIOGRAPHY HEAD W/O DYE: CPT

## 2020-06-24 PROCEDURE — 70544 MR ANGIOGRAPHY HEAD W/O DYE: CPT | Mod: 26

## 2020-07-10 ENCOUNTER — APPOINTMENT (OUTPATIENT)
Dept: ULTRASOUND IMAGING | Facility: CLINIC | Age: 85
End: 2020-07-10
Payer: MEDICARE

## 2020-07-10 ENCOUNTER — OUTPATIENT (OUTPATIENT)
Dept: OUTPATIENT SERVICES | Facility: HOSPITAL | Age: 85
LOS: 1 days | End: 2020-07-10
Payer: MEDICARE

## 2020-07-10 DIAGNOSIS — Z98.49 CATARACT EXTRACTION STATUS, UNSPECIFIED EYE: Chronic | ICD-10-CM

## 2020-07-10 DIAGNOSIS — Z98.89 OTHER SPECIFIED POSTPROCEDURAL STATES: Chronic | ICD-10-CM

## 2020-07-10 DIAGNOSIS — Z98.890 OTHER SPECIFIED POSTPROCEDURAL STATES: Chronic | ICD-10-CM

## 2020-07-10 DIAGNOSIS — Z00.8 ENCOUNTER FOR OTHER GENERAL EXAMINATION: ICD-10-CM

## 2020-07-10 PROCEDURE — 76536 US EXAM OF HEAD AND NECK: CPT

## 2020-07-10 PROCEDURE — 76536 US EXAM OF HEAD AND NECK: CPT | Mod: 26

## 2020-09-21 ENCOUNTER — APPOINTMENT (OUTPATIENT)
Dept: ULTRASOUND IMAGING | Facility: CLINIC | Age: 85
End: 2020-09-21
Payer: MEDICARE

## 2020-09-21 ENCOUNTER — OUTPATIENT (OUTPATIENT)
Dept: OUTPATIENT SERVICES | Facility: HOSPITAL | Age: 85
LOS: 1 days | End: 2020-09-21
Payer: MEDICARE

## 2020-09-21 DIAGNOSIS — Z98.890 OTHER SPECIFIED POSTPROCEDURAL STATES: Chronic | ICD-10-CM

## 2020-09-21 DIAGNOSIS — Z98.89 OTHER SPECIFIED POSTPROCEDURAL STATES: Chronic | ICD-10-CM

## 2020-09-21 DIAGNOSIS — Z00.8 ENCOUNTER FOR OTHER GENERAL EXAMINATION: ICD-10-CM

## 2020-09-21 DIAGNOSIS — Z98.49 CATARACT EXTRACTION STATUS, UNSPECIFIED EYE: Chronic | ICD-10-CM

## 2020-09-21 PROCEDURE — 76700 US EXAM ABDOM COMPLETE: CPT | Mod: 26

## 2020-09-21 PROCEDURE — 76700 US EXAM ABDOM COMPLETE: CPT

## 2020-11-09 ENCOUNTER — NON-APPOINTMENT (OUTPATIENT)
Age: 85
End: 2020-11-09

## 2020-12-12 ENCOUNTER — OUTPATIENT (OUTPATIENT)
Dept: OUTPATIENT SERVICES | Facility: HOSPITAL | Age: 85
LOS: 1 days | End: 2020-12-12
Payer: MEDICARE

## 2020-12-12 ENCOUNTER — APPOINTMENT (OUTPATIENT)
Dept: MRI IMAGING | Facility: CLINIC | Age: 85
End: 2020-12-12
Payer: MEDICARE

## 2020-12-12 DIAGNOSIS — Z98.49 CATARACT EXTRACTION STATUS, UNSPECIFIED EYE: Chronic | ICD-10-CM

## 2020-12-12 DIAGNOSIS — Z00.8 ENCOUNTER FOR OTHER GENERAL EXAMINATION: ICD-10-CM

## 2020-12-12 DIAGNOSIS — Z98.890 OTHER SPECIFIED POSTPROCEDURAL STATES: Chronic | ICD-10-CM

## 2020-12-12 DIAGNOSIS — Z98.89 OTHER SPECIFIED POSTPROCEDURAL STATES: Chronic | ICD-10-CM

## 2020-12-12 PROCEDURE — 72148 MRI LUMBAR SPINE W/O DYE: CPT | Mod: 26

## 2020-12-12 PROCEDURE — 72148 MRI LUMBAR SPINE W/O DYE: CPT

## 2021-03-19 ENCOUNTER — APPOINTMENT (OUTPATIENT)
Dept: INFECTIOUS DISEASE | Facility: CLINIC | Age: 86
End: 2021-03-19
Payer: MEDICARE

## 2021-03-19 VITALS
SYSTOLIC BLOOD PRESSURE: 144 MMHG | BODY MASS INDEX: 21.62 KG/M2 | TEMPERATURE: 98.1 F | HEIGHT: 63 IN | OXYGEN SATURATION: 99 % | DIASTOLIC BLOOD PRESSURE: 86 MMHG | RESPIRATION RATE: 20 BRPM | WEIGHT: 122 LBS | HEART RATE: 86 BPM

## 2021-03-19 PROCEDURE — 99204 OFFICE O/P NEW MOD 45 MIN: CPT

## 2021-03-21 LAB — HIV1+2 AB SPEC QL IA.RAPID: NONREACTIVE

## 2021-03-22 LAB
ALBUMIN SERPL ELPH-MCNC: 4.1 G/DL
ALP BLD-CCNC: 71 U/L
ALT SERPL-CCNC: 18 U/L
ANION GAP SERPL CALC-SCNC: 12 MMOL/L
AST SERPL-CCNC: 20 U/L
BASOPHILS # BLD AUTO: 0.04 K/UL
BASOPHILS NFR BLD AUTO: 0.7 %
BILIRUB SERPL-MCNC: 0.4 MG/DL
BUN SERPL-MCNC: 98 MG/DL
CALCIUM SERPL-MCNC: 9 MG/DL
CHLORIDE SERPL-SCNC: 104 MMOL/L
CO2 SERPL-SCNC: 22 MMOL/L
CREAT SERPL-MCNC: 2.79 MG/DL
DEPRECATED KAPPA LC FREE/LAMBDA SER: 4.85 RATIO
EOSINOPHIL # BLD AUTO: 0.24 K/UL
EOSINOPHIL NFR BLD AUTO: 4.4 %
GLUCOSE SERPL-MCNC: 91 MG/DL
HCT VFR BLD CALC: 34.4 %
HGB BLD-MCNC: 10.8 G/DL
IGA SER QL IEP: 812 MG/DL
IGG SER QL IEP: 1105 MG/DL
IGG SUBSET TOTAL IGG: 1106 MG/DL
IGG1 SER-MCNC: 760 MG/DL
IGG2 SER-MCNC: 88 MG/DL
IGG3 SER-MCNC: 20 MG/DL
IGG4 SER-MCNC: 3 MG/DL
IGM SER QL IEP: 49 MG/DL
IMM GRANULOCYTES NFR BLD AUTO: 0.2 %
KAPPA LC CSF-MCNC: 2.47 MG/DL
KAPPA LC SERPL-MCNC: 11.97 MG/DL
LYMPHOCYTES # BLD AUTO: 0.81 K/UL
LYMPHOCYTES NFR BLD AUTO: 14.8 %
MAN DIFF?: NORMAL
MCHC RBC-ENTMCNC: 31.4 GM/DL
MCHC RBC-ENTMCNC: 31.8 PG
MCV RBC AUTO: 101.2 FL
MONOCYTES # BLD AUTO: 0.54 K/UL
MONOCYTES NFR BLD AUTO: 9.8 %
NEUTROPHILS # BLD AUTO: 3.85 K/UL
NEUTROPHILS NFR BLD AUTO: 70.1 %
PLATELET # BLD AUTO: 177 K/UL
POTASSIUM SERPL-SCNC: 4.8 MMOL/L
PROT SERPL-MCNC: 7.3 G/DL
RBC # BLD: 3.4 M/UL
RBC # FLD: 13.7 %
SODIUM SERPL-SCNC: 138 MMOL/L
WBC # FLD AUTO: 5.49 K/UL

## 2021-03-22 NOTE — HISTORY OF PRESENT ILLNESS
[FreeTextEntry1] : 86 F AVR, scoliosis, h/o ONEIL presents today for hemoptysis referred back from pulmonary.\par New cavitary lesion on CT chest from 2/17/2021.\par \par H/o MGUS\par \par Had been followed by ID previously in 2015 but was not treated at that time for ONEIL as pt was hesitant to undergo treatment. \par \par 2/18/2021 developed hemoptysis.\par Went to [facility]. Was discharged 2/22/2021. \par Hemoptysis resolved on its own.\par She was given IV antibiotics and oral antibiotics.\par \par Sent home with oral antibiotics\par Give Azithomycin. Completed ~ 2/26/2021.\par \par She feels fatigued.  She was fatigued before also and feels it is worse now.\par She has a dry cough which is intermittent. \par \par She went to ENT for this cough.  She feels it is in her throat.  She was told she has esophageal/larynx reflux.  She was told to gargle with baking soda/warm water. She didn't feel it helped her.\par \par She says a culture was taken in the hospital.  She signed a medical release for the records from [facility].\par Feels uncomfortable in her chest. \par \par She is concerned b/c she feels she coughed up copious blood.  \par She was placed in isolation at hospital b/c there was concern for TB.  \par She was told it is not TB and more likely NTM.

## 2021-03-22 NOTE — PHYSICAL EXAM
[General Appearance - Alert] : alert [General Appearance - In No Acute Distress] : in no acute distress [Sclera] : the sclera and conjunctiva were normal [PERRL With Normal Accommodation] : pupils were equal in size, round, reactive to light [Extraocular Movements] : extraocular movements were intact [Outer Ear] : the ears and nose were normal in appearance [Oropharynx] : the oropharynx was normal with no thrush [Neck Appearance] : the appearance of the neck was normal [Neck Cervical Mass (___cm)] : no neck mass was observed [Jugular Venous Distention Increased] : there was no jugular-venous distention [Thyroid Diffuse Enlargement] : the thyroid was not enlarged [] : no respiratory distress [Auscultation Breath Sounds / Voice Sounds] : lungs were clear to auscultation bilaterally [Heart Rate And Rhythm] : heart rate was normal and rhythm regular [Edema] : there was no peripheral edema [Bowel Sounds] : normal bowel sounds [Abdomen Soft] : soft [Cervical Lymph Nodes Enlarged Posterior Bilaterally] : posterior cervical [Cervical Lymph Nodes Enlarged Anterior Bilaterally] : anterior cervical [Supraclavicular Lymph Nodes Enlarged Bilaterally] : supraclavicular [Musculoskeletal - Swelling] : no joint swelling [Skin Lesions] : no skin lesions [No Focal Deficits] : no focal deficits [Oriented To Time, Place, And Person] : oriented to person, place, and time [Affect] : the affect was normal

## 2021-03-22 NOTE — REVIEW OF SYSTEMS
[Feeling Tired] : feeling tired [Cough] : cough [Negative] : Heme/Lymph [FreeTextEntry6] : dry cough

## 2021-03-22 NOTE — CONSULT LETTER
[Dear  ___] : Dear  [unfilled], [Consult Letter:] : I had the pleasure of evaluating your patient, [unfilled]. [Please see my note below.] : Please see my note below. [Consult Closing:] : Thank you very much for allowing me to participate in the care of this patient.  If you have any questions, please do not hesitate to contact me. [Sincerely,] : Sincerely, [FreeTextEntry2] : Dr. Jake Anderson\par  [FreeTextEntry3] : \par Miranda Davis MD\par  of Medicine\par Division of Infectious Diseases\par [facility] [employer] [facility]\par [address] [place] [address] [address]is\[place] [address]\par Tel: [phone]\par Fax: [phone]

## 2021-03-22 NOTE — ASSESSMENT
[FreeTextEntry1] : 87 yo F h/o ONEIL, bronchiectasis, chronic dry cough, abnormal ct chest, recent hemoptysis presents today to discuss ONEIL treatment. Pt with known h/o MGUS and HTN.\par \par Recent CT chest has progression with 1.6 cm cavitary lesion in RLL.\par I do not have culture results from recent hospital stay but she says she is having them faxed to her pulmonologist.\par \par I requested she bring back 3 more samples and i will try to obtain sensitivities if culture grows AFB.\par \par Treatment would involve azithromycin, ethambutol, rifampin and likely iv amikacin as she has cavity now.\par She is not sure she wants treatment and will think about this.\par I have given her literature to read today.\par \par She should use mucus clearing devices for now.\par Planned for f/up ct chest and asked her to fax me results as well.\par \par She will f/up with in 6-8 weeks after cultures are resulted.\par If she wishes some treatment, could consider oral agents only without the intent of cure but more to slow progression and to help prevent further hemoptysis. \par \par All questions answered. \par \par Time spent 45 minutes.\par

## 2021-03-24 ENCOUNTER — NON-APPOINTMENT (OUTPATIENT)
Age: 86
End: 2021-03-24

## 2021-03-29 LAB — CFTR MUT TESTED BLD/T: NEGATIVE

## 2021-04-02 ENCOUNTER — NON-APPOINTMENT (OUTPATIENT)
Age: 86
End: 2021-04-02

## 2021-04-02 LAB — SERPINA1 GENE MUT TESTED BLD/T: NORMAL

## 2021-04-23 ENCOUNTER — NON-APPOINTMENT (OUTPATIENT)
Age: 86
End: 2021-04-23

## 2021-04-23 DIAGNOSIS — Z86.19 PERSONAL HISTORY OF OTHER INFECTIOUS AND PARASITIC DISEASES: ICD-10-CM

## 2021-05-03 ENCOUNTER — APPOINTMENT (OUTPATIENT)
Dept: NEUROLOGY | Facility: CLINIC | Age: 86
End: 2021-05-03
Payer: MEDICARE

## 2021-05-03 VITALS — TEMPERATURE: 97.7 F

## 2021-05-03 PROCEDURE — 93886 INTRACRANIAL COMPLETE STUDY: CPT

## 2021-05-03 PROCEDURE — 93892 TCD EMBOLI DETECT W/O INJ: CPT

## 2021-05-03 PROCEDURE — 93880 EXTRACRANIAL BILAT STUDY: CPT

## 2021-05-04 ENCOUNTER — APPOINTMENT (OUTPATIENT)
Dept: INFECTIOUS DISEASE | Facility: CLINIC | Age: 86
End: 2021-05-04
Payer: MEDICARE

## 2021-05-04 VITALS
OXYGEN SATURATION: 98 % | BODY MASS INDEX: 23 KG/M2 | DIASTOLIC BLOOD PRESSURE: 93 MMHG | WEIGHT: 125 LBS | HEIGHT: 62 IN | SYSTOLIC BLOOD PRESSURE: 173 MMHG | TEMPERATURE: 98.1 F | HEART RATE: 74 BPM

## 2021-05-04 VITALS — SYSTOLIC BLOOD PRESSURE: 145 MMHG | DIASTOLIC BLOOD PRESSURE: 85 MMHG

## 2021-05-04 PROCEDURE — 99213 OFFICE O/P EST LOW 20 MIN: CPT

## 2021-05-04 NOTE — ASSESSMENT
[FreeTextEntry1] : 86 yo F h/o ONEIL, bronchiectasis, chronic dry cough, abnormal ct chest, recent hemoptysis presents today to f/up for ONEIL.  Pt with known h/o MGUS and HTN.\par \par Recent CT chest has progression with 1.6 cm cavitary lesion in RLL.\par Culture with ONEIL from [facility] in [facility]. Cultures scanned in to chart. \par \par I requested she bring back 3 more samples and i will try to obtain sensitivities if culture grows AFB.  She has not been able to bring back more phlegm.\par \par Treatment would involve azithromycin, ethambutol, rifampin and likely iv amikacin as she has cavity now.\par She is not sure she wants treatment because she feels she can't tolerate it.\par I have given her literature to read and she did read it.  \par \par She should use mucus clearing devices for now.  To get aerobika and start pulm rehab next week. \par Planned for f/up ct chest and asked her to fax me results as well.\par \par She will f/up with in 3-4 months or sooner if needed. \par If she wishes some treatment, could consider oral agents only without the intent of cure but more to slow progression and to help prevent further hemoptysis. \par \par All questions answered. \par \par Time spent 25 minutes, \par

## 2021-05-04 NOTE — CONSULT LETTER
[Dear  ___] : Dear  [unfilled], [Consult Letter:] : I had the pleasure of evaluating your patient, [unfilled]. [Please see my note below.] : Please see my note below. [Consult Closing:] : Thank you very much for allowing me to participate in the care of this patient.  If you have any questions, please do not hesitate to contact me. [Sincerely,] : Sincerely, [FreeTextEntry2] : Dr. Jake Anderson\par  [FreeTextEntry3] : \par Miranda Davis MD\par  of Medicine\par Division of Infectious Diseases\par [facility] [employer] [facility] [employer] [facility]\par [address] [place] [address] [address]iris\[place] [address]\par Tel: [phone]\par Fax: [phone]

## 2021-05-04 NOTE — HISTORY OF PRESENT ILLNESS
[FreeTextEntry1] : 87 F AVR, scoliosis, h/o ONEIL presents today for hemoptysis referred back from pulmonary.\par New cavitary lesion on CT chest from 2/17/2021.\par \par H/o MGUS\par \par Had been followed by ID previously in 2015 but was not treated at that time for ONEIL as pt was hesitant to undergo treatment. \par \par 2/18/2021 developed hemoptysis.\par Went to [facility]. Was discharged 2/22/2021. \par Hemoptysis resolved on its own.\par She was given IV antibiotics and oral antibiotics.\par \par Sent home with oral antibiotics\par Give Azithomycin. Completed ~ 2/26/2021.\par \par She feels fatigued.  She was fatigued before also and feels it is worse now.\par She has a dry cough which is intermittent. \par \par She went to ENT for this cough.  She feels it is in her throat.  She was told she has esophageal/larynx reflux.  She was told to gargle with baking soda/warm water. She didn't feel it helped her.\par \par She says a culture was taken in the hospital.  She signed a medical release for the records from [facility].\par Feels uncomfortable in her chest. \par \par She is concerned b/c she feels she coughed up copious blood.  \par She was placed in isolation at hospital b/c there was concern for TB.  \par She was told it is not TB and more likely NTM.  \par \par Since last visit:\par Has not been able to bring up phlegm.  Had trouble getting aerobika.\par Awaiting pulmonary rehab. Starting 5/11/21.  Will get aerobika at rehab.  Cough is intermittent. No weight loss, in fact gained 3 lbs. But she makes herself eat.  Cough is uncomfortable.  Feels "lousy" and "fatigued".  She read booklet i gave her and says it was miracle for her b/c she felt she had those symptoms so she feels she understands why she has these symptoms.\par \par She is getting another CT chest end of 5/2021. \par \par She also had bone marrow biopsy. Consistent with MGUS.\par Creatinine 3.2 4/5/21 - did improve since then.\par Has dry cough. Coughs every morning. Coughing fits end in sneeze. Does have SOB. \par Getting 2nd Pfizer vaccine tomorrow 5/5/2021.

## 2021-05-11 ENCOUNTER — APPOINTMENT (OUTPATIENT)
Dept: PULMONOLOGY | Facility: CLINIC | Age: 86
End: 2021-05-11

## 2021-05-11 ENCOUNTER — APPOINTMENT (OUTPATIENT)
Dept: PULMONOLOGY | Facility: CLINIC | Age: 86
End: 2021-05-11
Payer: MEDICARE

## 2021-05-11 ENCOUNTER — NON-APPOINTMENT (OUTPATIENT)
Age: 86
End: 2021-05-11

## 2021-05-11 VITALS
HEART RATE: 67 BPM | RESPIRATION RATE: 16 BRPM | SYSTOLIC BLOOD PRESSURE: 160 MMHG | BODY MASS INDEX: 23 KG/M2 | OXYGEN SATURATION: 97 % | HEIGHT: 62 IN | WEIGHT: 125 LBS | TEMPERATURE: 98.4 F | DIASTOLIC BLOOD PRESSURE: 80 MMHG

## 2021-05-11 VITALS — DIASTOLIC BLOOD PRESSURE: 89 MMHG | SYSTOLIC BLOOD PRESSURE: 165 MMHG

## 2021-05-11 DIAGNOSIS — R06.00 DYSPNEA, UNSPECIFIED: ICD-10-CM

## 2021-05-11 PROCEDURE — ZZZZZ: CPT

## 2021-05-11 PROCEDURE — G0403: CPT

## 2021-05-11 PROCEDURE — 99204 OFFICE O/P NEW MOD 45 MIN: CPT | Mod: 25

## 2021-05-11 NOTE — ASSESSMENT
[FreeTextEntry1] : 88 yo F with a h/o AVR bovine valve, scoliosis, MGUS, chronic ONEIL since 2015 which has not been treated who presents for pulmonary rehabilitation evaluation.\par New RLL 1.6 cm cavitary lung lesion on CT chest 2/17/2021\par Referred for pulmonary rehab by her pulmonologist Dr. Jake Anderson\par Completed COVID 19 Pfizer vaccine on 5/5/21. \par Xopenex- minimally uses at it as she feels it does not help her\par PFTs reviewed from 2/2/2020: moderate obstructive defect, normal DLCO\par Provided with cardiac clearance letter which she has with her today. \par \par A/P: Chronic dyspnea, progressive\par Baseline EKG performed today- NSR\par Per patient, to see Dr. Anderson in 2 weeks and have repeat PFTs and CT chest. Will forward those results for review.\par Aerobika to be used 3x a day for airway clearance.\par Would benefit from pulmonary rehabilitation - intake appointment scheduled for 5/27

## 2021-05-11 NOTE — REVIEW OF SYSTEMS
[Cough] : cough [SOB on Exertion] : sob on exertion [Negative] : Endocrine [Chest Tightness] : no chest tightness [Frequent URIs] : no frequent URIs [Sputum] : no sputum [Pleuritic Pain] : no pleuritic pain [Wheezing] : no wheezing [TextBox_30] : per HPI [TextBox_69] : per hpi

## 2021-05-11 NOTE — CONSULT LETTER
[Dear  ___] : Dear  [unfilled], [Consult Letter:] : I had the pleasure of evaluating your patient, [unfilled]. [( Thank you for referring [unfilled] for consultation for _____ )] : Thank you for referring [unfilled] for consultation for [unfilled] [Please see my note below.] : Please see my note below. [Consult Closing:] : Thank you very much for allowing me to participate in the care of this patient.  If you have any questions, please do not hesitate to contact me. [FreeTextEntry2] : Dr. Jake Garveymid

## 2021-05-11 NOTE — HISTORY OF PRESENT ILLNESS
[Never] : never [TextBox_4] : 88 yo F with a h/o AVR bovine valve, scoliosis, MGUS, chronic ONEIL since 2015 which has not been treated.\par Recent hemoptysis and hospitalization 2/18-2/22/21 at [facility]\par New RLL 1.6 cm cavitary lung lesion on CT chest 2/17/2021\par Referred for pulmonary rehab by her pulmonologist Dr. Jake Anderson\par ID: Miranda Davis\par Cardiology: Dr. Tcuker Perez\par \par Completed Pfizer on 5/5/21 second vaccine\par \par Main complaint is dyspnea on minimal exertion - ongoing and progressive over the past few years. \par Chronic cough, dry\par Was trained to use Aerobika in the office this morning\par Denies wheezing, chest pain\par [place]azy in the morning\par Weight is stable. \par \par Meds:\par Metoprolol\par Pepcid\par \par PFTs reviewed from 2/2/2020: moderate obstructive defect, normal DLCO\par \par Provided with cardiac clearance letter which she has with her today.

## 2021-05-11 NOTE — REASON FOR VISIT
[Initial] : an initial visit [TextBox_44] : Chronic ONEIL infection, pulmonary rehabilitation referral

## 2021-05-11 NOTE — PHYSICAL EXAM
[No Acute Distress] : no acute distress [Normal Oropharynx] : normal oropharynx [Normal Appearance] : normal appearance [No Neck Mass] : no neck mass [Normal Rate/Rhythm] : normal rate/rhythm [Normal S1, S2] : normal s1, s2 [No Murmurs] : no murmurs [No Resp Distress] : no resp distress [No Acc Muscle Use] : no acc muscle use [Clear to Auscultation Bilaterally] : clear to auscultation bilaterally [No Abnormalities] : no abnormalities [Benign] : benign [Normal Gait] : normal gait [No Clubbing] : no clubbing [No Cyanosis] : no cyanosis [No Edema] : no edema [FROM] : FROM [Normal Color/ Pigmentation] : normal color/ pigmentation [No Focal Deficits] : no focal deficits [Oriented x3] : oriented x3 [Normal Affect] : normal affect [TextBox_68] : distant BS throughout

## 2021-05-13 ENCOUNTER — APPOINTMENT (OUTPATIENT)
Dept: NEUROLOGY | Facility: CLINIC | Age: 86
End: 2021-05-13
Payer: MEDICARE

## 2021-05-13 VITALS
HEIGHT: 63 IN | BODY MASS INDEX: 21.62 KG/M2 | SYSTOLIC BLOOD PRESSURE: 175 MMHG | HEART RATE: 76 BPM | WEIGHT: 122 LBS | DIASTOLIC BLOOD PRESSURE: 72 MMHG

## 2021-05-13 VITALS
DIASTOLIC BLOOD PRESSURE: 72 MMHG | HEIGHT: 63 IN | HEART RATE: 75 BPM | WEIGHT: 122 LBS | BODY MASS INDEX: 21.62 KG/M2 | SYSTOLIC BLOOD PRESSURE: 173 MMHG

## 2021-05-13 PROBLEM — Z86.19 HISTORY OF MYCOBACTERIUM AVIUM INTRACELLULARE INFECTION: Status: RESOLVED | Noted: 2021-05-13 | Resolved: 2021-05-13

## 2021-05-13 PROCEDURE — 99215 OFFICE O/P EST HI 40 MIN: CPT

## 2021-05-13 NOTE — HISTORY OF PRESENT ILLNESS
[FreeTextEntry1] : 87-year-old ambidextrous white lady \par Summary from  5/3/18\par She Came for an opinion regarding a right cavernous ICA aneurysm.\par \par Summary from Dr. 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 In 1/9/18, she was admitted to the hospital with urosepsis leading to E Coli bacteremia. She underwent work up including  for possible infective endocarditis. She was treated with ABx for total of 2 weeks. During the work up, she had CT brain performed due to unknown reasons. She denies any significant HAs at that time but reported to be confused/disoriented at that time due to infection/sepsis. She denies any significant head injury before her SAH but reports to have head injury about 2 weeks ago without any LOC. She reports to be taking warfarin for 3 months after the AVR surgery. Of note, she also reports to have history of migraines on Zomig.\par \par CT brain (1/10/18 12:12 AM): Subarachnoid hemorrhage noted in left high frontal and left fronto-parietal region\par \par MRA head (1/13/18): Small 2-3 mm aneurysm noted involving right cavernous left supraclinoid ICA.\par MRI brain (1/18): No evidence of acute infarct, previously noted subarachnoid hemorrhage showed complete resolution, an isolated micro-hemorrhage noted in the right high frontal region involving the cortex/juxta-cortical region\par \par 5/3/18. She came to the office today. She reports no new focal neurologic symptoms. She was told that she does not need to take aspirin from the standpoint of her bovine AVR\par \par Repeat MRI brain (4/18/18) to my eye  showed a small focus of susceptibility in the high right parietal cortex, consistent with either a chronic microhemorrhage, mineralization or a small cavernoma.\par Repeat MRA neck and head (4/18/18) showed a right cavernous carotid aneurysm, measured at 3.4 mm.\par \par 10/24/19. She Came to the Office Today. She has chronic, mild postural instability. 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 This service was provided using telehealth (video). The patient consented to this service.\par Location of patient: Home\par Location of provider: Office\par Names of all persons participating in the telehealth service and their role in the encounter: Patient\par \par She reports anxiety related to social isolation due to COVID 19 but she is coping.  She frequently awakens with brief right-sided headaches, diagnosed by Dr. Alegria as possible "hypnic headache".  No new focal neurologic symptoms.\par \par Carotid Doppler (5/21/2020) showed moderate heterogeneous plaque on the right with 40-60% stenosis by velocity criteria, 50% diameter reduction.  The right ICA was markedly tortuous.  The left ICA was tortuous but there was no plaque.  The extracranial vertebral arteries showed anterograde flow with a normal resistance pattern.  Heart rate was noted to be irregular.  Incidentally noted were bilateral complex thyroid nodules.\par \par Transcranial Doppler (5/21/2020) of the [place] was normal with no sign of stenosis.  Heart rate was noted to be irregular.\par \par Repeat MRA brain (6/24/2020) showed a right cavernous carotid aneurysm, measured at 2.7 x 2.2 mm, not significantly changed compared to the prior MRA.\par \par 5/13/2021. She came to the office today. She reports chronic lightheadedness, along with episodic "fogginess" and fatigue, possibly related to "reactive hypoglycemia". Otherwise no new focal neurologic symptoms.\par \par Repeat carotid Doppler (5/3/2021)  showed moderate heterogeneous plaque on the right with 40-60% stenosis by velocity criteria, 50% diameter reduction.  The right ICA was markedly tortuous.  The left ICA was tortuous but there was no plaque. Heart rate was noted to be irregular.  Incidentally noted were bilateral complex thyroid nodules.  Impression.  No significant change compared to 5/2020.\par \par Repeat transcranial Doppler (5/3/2021) was normal.  Heart rate was noted to be irregular.  Impression.  No significant change compared to 5/20.\par \par \par \par \par

## 2021-05-13 NOTE — REVIEW OF SYSTEMS
[FreeTextEntry2] : Probable snoring [de-identified] : Anxiety related to social isolation due to COVID 19 [FreeTextEntry6] : Occasional mild shortness of breath related to asthma [FreeTextEntry8] : Recent UTIs which have been treated [de-identified] : Episodes of fatigue, mental fogginess and hunger, related to "reactive hypoglycemia"

## 2021-05-13 NOTE — DISCUSSION/SUMMARY
[FreeTextEntry1] : Summary from Dr. Temple of note of 3/26/18.\par  In 8/17, she underwent aortic valve replacement with bioprosthetic valve. In 1/18, she was admitted to OSH with urosepsis (E Coli). She reports to be confused/disoriented and febrile at the time of admission to OSH CT brain on 1/10/18  showed evidence of acute subarachnoid hemorrhage in left high frontal and left fronto-parietal region. MRI brain did not show any evidence of acute infarct and previously noted subarachnoid hemorrhage showed complete resolution; although incidentally showed an isolated micro-hemorrhage in the right high frontal region involving the cortex/juxta-cortical region. MRA head on 1/13/18 showed a 3 mm aneurysm of the right cavernous/supraclinoid ICA.  Transesophageal echo did not show any evidence of vegetations. \par \par Impression:\par 1. Nontraumatic left hemispheric (left high frontal and left frontal parietal region) subarachnoid hemorrhage of undetermined etiology; possible distal aneurysm, reversible cerebral vasoconstriction syndrome etc.\par 2. 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 She has mild postural instability, probably multifactorial, and otherwise she is neurologically intact.\par \par I suspect that her previous left-sided convexity subarachnoid hemorrhage was due to amyloid angiopathy.\par She has an incidental, unruptured small right cavernous carotid aneurysm. At that size and in that location, this aneurysm is of minimal risk to her. There is no role for treatment. She should continue her usual activities without restrictions. As a precaution, she could have repeat MRA in one year.\par \par 10/24/19. Overall she is neurologically stable and doing well. She has chronic, mild postural instability, probably multifactorial.  I gave her a prescription for physiotherapy.\par \par She should have repeat MRA brain in approximately 6 months to monitor her right cavernous carotid aneurysm for stability.\par \par 5/14/2020.  Overall she is neurologically stable and doing well.  She awakens with headaches diagnosed as possible "hypnic headache", and almost definitely unrelated to her small intracranial aneurysm.\par \par Carotid Doppler (5/21/2020) showed asymptomatic right carotid stenosis of about 50%, of no clinical significance.\par I defer to Dr. Perez's judgment as to whether the irregular heart rate, as well as thyroid nodules, incidentally detected on Doppler, warrant further investigation.\par \par Repeat MRA brain (6/24/2020) showed no significant change in the small right cavernous carotid aneurysm.  Given her age and the stability of the aneurysm, I do not believe that she will benefit from further routine monitoring of this aneurysm.\par \par 5/13/2021. Overall she is neurologically stable and doing well.\par \par Dopplers show stable, moderate, asymptomatic right carotid stenosis of about 50%, for which there is no role for revascularization.\par \par She has daytime fatigue and snoring, raising the possibility of sleep apnea, and I have requested a home sleep study\par \par I advised her to follow-up with you to ensure optimal blood pressure control.\par \par She can followup with NP Bambi Hussein in about 3 months, and with me in 6-12 months. I hope that she remains free of serious trouble.\par

## 2021-05-23 ENCOUNTER — APPOINTMENT (OUTPATIENT)
Dept: DISASTER EMERGENCY | Facility: CLINIC | Age: 86
End: 2021-05-23

## 2021-05-23 DIAGNOSIS — Z01.818 ENCOUNTER FOR OTHER PREPROCEDURAL EXAMINATION: ICD-10-CM

## 2021-05-27 ENCOUNTER — APPOINTMENT (OUTPATIENT)
Dept: PULMONOLOGY | Facility: CLINIC | Age: 86
End: 2021-05-27

## 2021-05-28 DIAGNOSIS — Z01.818 ENCOUNTER FOR OTHER PREPROCEDURAL EXAMINATION: ICD-10-CM

## 2021-05-29 ENCOUNTER — APPOINTMENT (OUTPATIENT)
Dept: DISASTER EMERGENCY | Facility: CLINIC | Age: 86
End: 2021-05-29

## 2021-05-29 LAB — SARS-COV-2 N GENE NPH QL NAA+PROBE: NOT DETECTED

## 2021-06-02 ENCOUNTER — APPOINTMENT (OUTPATIENT)
Dept: PULMONOLOGY | Facility: CLINIC | Age: 86
End: 2021-06-02
Payer: MEDICARE

## 2021-06-02 PROCEDURE — 94618 PULMONARY STRESS TESTING: CPT

## 2021-06-08 ENCOUNTER — APPOINTMENT (OUTPATIENT)
Dept: PULMONOLOGY | Facility: CLINIC | Age: 86
End: 2021-06-08
Payer: MEDICARE

## 2021-06-08 PROCEDURE — G0239: CPT

## 2021-06-10 ENCOUNTER — APPOINTMENT (OUTPATIENT)
Dept: PULMONOLOGY | Facility: CLINIC | Age: 86
End: 2021-06-10

## 2021-06-10 ENCOUNTER — APPOINTMENT (OUTPATIENT)
Dept: PULMONOLOGY | Facility: CLINIC | Age: 86
End: 2021-06-10
Payer: MEDICARE

## 2021-06-10 PROCEDURE — G0239: CPT

## 2021-06-15 ENCOUNTER — APPOINTMENT (OUTPATIENT)
Dept: PULMONOLOGY | Facility: CLINIC | Age: 86
End: 2021-06-15
Payer: MEDICARE

## 2021-06-15 ENCOUNTER — APPOINTMENT (OUTPATIENT)
Dept: PULMONOLOGY | Facility: CLINIC | Age: 86
End: 2021-06-15

## 2021-06-15 PROCEDURE — G0239: CPT

## 2021-06-17 ENCOUNTER — APPOINTMENT (OUTPATIENT)
Dept: PULMONOLOGY | Facility: CLINIC | Age: 86
End: 2021-06-17
Payer: MEDICARE

## 2021-06-17 PROCEDURE — G0239: CPT

## 2021-06-24 ENCOUNTER — APPOINTMENT (OUTPATIENT)
Dept: PULMONOLOGY | Facility: CLINIC | Age: 86
End: 2021-06-24
Payer: MEDICARE

## 2021-06-24 PROCEDURE — G0239: CPT

## 2021-06-29 ENCOUNTER — APPOINTMENT (OUTPATIENT)
Dept: PULMONOLOGY | Facility: CLINIC | Age: 86
End: 2021-06-29
Payer: MEDICARE

## 2021-06-29 PROCEDURE — G0239: CPT

## 2021-06-29 NOTE — PATIENT PROFILE ADULT. - PMH
Bronchiolectasis    Elevated serum creatinine  baseline  renal Md Hilepo  Migraine    Mitral valve prolapse    Nonrheumatic aortic (valve) stenosis    Palpitations    Scoliosis unspecified, unspecified site
rest

## 2021-07-01 ENCOUNTER — APPOINTMENT (OUTPATIENT)
Dept: PULMONOLOGY | Facility: CLINIC | Age: 86
End: 2021-07-01

## 2021-07-06 ENCOUNTER — APPOINTMENT (OUTPATIENT)
Dept: PULMONOLOGY | Facility: CLINIC | Age: 86
End: 2021-07-06
Payer: MEDICARE

## 2021-07-06 PROCEDURE — G0239: CPT

## 2021-07-08 ENCOUNTER — APPOINTMENT (OUTPATIENT)
Dept: PULMONOLOGY | Facility: CLINIC | Age: 86
End: 2021-07-08

## 2021-07-13 ENCOUNTER — APPOINTMENT (OUTPATIENT)
Dept: PULMONOLOGY | Facility: CLINIC | Age: 86
End: 2021-07-13
Payer: MEDICARE

## 2021-07-13 PROCEDURE — G0239: CPT

## 2021-07-15 ENCOUNTER — APPOINTMENT (OUTPATIENT)
Dept: PULMONOLOGY | Facility: CLINIC | Age: 86
End: 2021-07-15
Payer: MEDICARE

## 2021-07-15 PROCEDURE — G0239: CPT

## 2021-07-17 ENCOUNTER — APPOINTMENT (OUTPATIENT)
Dept: CT IMAGING | Facility: CLINIC | Age: 86
End: 2021-07-17
Payer: MEDICARE

## 2021-07-17 ENCOUNTER — OUTPATIENT (OUTPATIENT)
Dept: OUTPATIENT SERVICES | Facility: HOSPITAL | Age: 86
LOS: 1 days | End: 2021-07-17
Payer: MEDICARE

## 2021-07-17 DIAGNOSIS — Z00.8 ENCOUNTER FOR OTHER GENERAL EXAMINATION: ICD-10-CM

## 2021-07-17 DIAGNOSIS — Z98.890 OTHER SPECIFIED POSTPROCEDURAL STATES: Chronic | ICD-10-CM

## 2021-07-17 DIAGNOSIS — Z98.89 OTHER SPECIFIED POSTPROCEDURAL STATES: Chronic | ICD-10-CM

## 2021-07-17 DIAGNOSIS — Z98.49 CATARACT EXTRACTION STATUS, UNSPECIFIED EYE: Chronic | ICD-10-CM

## 2021-07-17 PROCEDURE — 71250 CT THORAX DX C-: CPT | Mod: 26,MH

## 2021-07-17 PROCEDURE — 71250 CT THORAX DX C-: CPT

## 2021-07-20 ENCOUNTER — APPOINTMENT (OUTPATIENT)
Dept: PULMONOLOGY | Facility: CLINIC | Age: 86
End: 2021-07-20
Payer: MEDICARE

## 2021-07-20 PROCEDURE — G0239: CPT

## 2021-07-22 ENCOUNTER — APPOINTMENT (OUTPATIENT)
Dept: PULMONOLOGY | Facility: CLINIC | Age: 86
End: 2021-07-22
Payer: MEDICARE

## 2021-07-22 PROCEDURE — G0239: CPT

## 2021-07-27 ENCOUNTER — APPOINTMENT (OUTPATIENT)
Dept: PULMONOLOGY | Facility: CLINIC | Age: 86
End: 2021-07-27
Payer: MEDICARE

## 2021-07-27 PROCEDURE — G0239: CPT

## 2021-07-29 ENCOUNTER — APPOINTMENT (OUTPATIENT)
Dept: PULMONOLOGY | Facility: CLINIC | Age: 86
End: 2021-07-29
Payer: MEDICARE

## 2021-07-29 PROCEDURE — G0239: CPT

## 2021-08-03 ENCOUNTER — APPOINTMENT (OUTPATIENT)
Dept: PULMONOLOGY | Facility: CLINIC | Age: 86
End: 2021-08-03
Payer: MEDICARE

## 2021-08-03 PROCEDURE — G0239: CPT

## 2021-08-05 ENCOUNTER — APPOINTMENT (OUTPATIENT)
Dept: PULMONOLOGY | Facility: CLINIC | Age: 86
End: 2021-08-05

## 2021-08-10 ENCOUNTER — APPOINTMENT (OUTPATIENT)
Dept: PULMONOLOGY | Facility: CLINIC | Age: 86
End: 2021-08-10
Payer: MEDICARE

## 2021-08-10 PROCEDURE — G0239: CPT

## 2021-08-12 ENCOUNTER — APPOINTMENT (OUTPATIENT)
Dept: PULMONOLOGY | Facility: CLINIC | Age: 86
End: 2021-08-12
Payer: MEDICARE

## 2021-08-12 PROCEDURE — G0239: CPT

## 2021-08-17 ENCOUNTER — APPOINTMENT (OUTPATIENT)
Dept: PULMONOLOGY | Facility: CLINIC | Age: 86
End: 2021-08-17
Payer: MEDICARE

## 2021-08-17 PROCEDURE — G0239: CPT

## 2021-08-19 ENCOUNTER — APPOINTMENT (OUTPATIENT)
Dept: PULMONOLOGY | Facility: CLINIC | Age: 86
End: 2021-08-19
Payer: MEDICARE

## 2021-08-19 PROCEDURE — G0239: CPT

## 2021-08-24 ENCOUNTER — APPOINTMENT (OUTPATIENT)
Dept: PULMONOLOGY | Facility: CLINIC | Age: 86
End: 2021-08-24
Payer: MEDICARE

## 2021-08-24 PROCEDURE — G0239: CPT

## 2021-08-26 ENCOUNTER — APPOINTMENT (OUTPATIENT)
Dept: PULMONOLOGY | Facility: CLINIC | Age: 86
End: 2021-08-26

## 2021-08-31 ENCOUNTER — APPOINTMENT (OUTPATIENT)
Dept: PULMONOLOGY | Facility: CLINIC | Age: 86
End: 2021-08-31
Payer: MEDICARE

## 2021-08-31 PROCEDURE — G0239: CPT

## 2021-09-02 ENCOUNTER — APPOINTMENT (OUTPATIENT)
Dept: PULMONOLOGY | Facility: CLINIC | Age: 86
End: 2021-09-02

## 2021-09-07 ENCOUNTER — APPOINTMENT (OUTPATIENT)
Dept: PULMONOLOGY | Facility: CLINIC | Age: 86
End: 2021-09-07

## 2021-09-09 ENCOUNTER — APPOINTMENT (OUTPATIENT)
Dept: PULMONOLOGY | Facility: CLINIC | Age: 86
End: 2021-09-09
Payer: MEDICARE

## 2021-09-09 PROCEDURE — G0239: CPT

## 2021-09-14 ENCOUNTER — APPOINTMENT (OUTPATIENT)
Dept: PULMONOLOGY | Facility: CLINIC | Age: 86
End: 2021-09-14
Payer: MEDICARE

## 2021-09-14 PROCEDURE — G0239: CPT

## 2021-09-21 ENCOUNTER — APPOINTMENT (OUTPATIENT)
Dept: PULMONOLOGY | Facility: CLINIC | Age: 86
End: 2021-09-21
Payer: MEDICARE

## 2021-09-21 PROCEDURE — G0239: CPT

## 2021-09-23 ENCOUNTER — APPOINTMENT (OUTPATIENT)
Dept: PULMONOLOGY | Facility: CLINIC | Age: 86
End: 2021-09-23
Payer: MEDICARE

## 2021-09-23 PROCEDURE — G0239: CPT

## 2021-09-28 ENCOUNTER — APPOINTMENT (OUTPATIENT)
Dept: PULMONOLOGY | Facility: CLINIC | Age: 86
End: 2021-09-28
Payer: MEDICARE

## 2021-09-28 PROCEDURE — G0239: CPT

## 2021-09-30 ENCOUNTER — APPOINTMENT (OUTPATIENT)
Dept: PULMONOLOGY | Facility: CLINIC | Age: 86
End: 2021-09-30
Payer: MEDICARE

## 2021-09-30 PROCEDURE — G0239: CPT

## 2021-10-05 ENCOUNTER — APPOINTMENT (OUTPATIENT)
Dept: PULMONOLOGY | Facility: CLINIC | Age: 86
End: 2021-10-05
Payer: MEDICARE

## 2021-10-05 PROCEDURE — G0239: CPT

## 2021-10-07 ENCOUNTER — APPOINTMENT (OUTPATIENT)
Dept: PULMONOLOGY | Facility: CLINIC | Age: 86
End: 2021-10-07

## 2021-10-12 ENCOUNTER — APPOINTMENT (OUTPATIENT)
Dept: PULMONOLOGY | Facility: CLINIC | Age: 86
End: 2021-10-12

## 2021-10-14 ENCOUNTER — APPOINTMENT (OUTPATIENT)
Dept: PULMONOLOGY | Facility: CLINIC | Age: 86
End: 2021-10-14

## 2021-10-15 ENCOUNTER — APPOINTMENT (OUTPATIENT)
Dept: DISASTER EMERGENCY | Facility: CLINIC | Age: 86
End: 2021-10-15

## 2021-10-16 LAB — SARS-COV-2 N GENE NPH QL NAA+PROBE: NOT DETECTED

## 2021-10-19 ENCOUNTER — APPOINTMENT (OUTPATIENT)
Dept: PULMONOLOGY | Facility: CLINIC | Age: 86
End: 2021-10-19

## 2021-10-21 ENCOUNTER — APPOINTMENT (OUTPATIENT)
Dept: PULMONOLOGY | Facility: CLINIC | Age: 86
End: 2021-10-21

## 2021-10-26 ENCOUNTER — APPOINTMENT (OUTPATIENT)
Dept: PULMONOLOGY | Facility: CLINIC | Age: 86
End: 2021-10-26
Payer: MEDICARE

## 2021-10-26 PROCEDURE — G0239: CPT

## 2021-10-28 ENCOUNTER — APPOINTMENT (OUTPATIENT)
Dept: PULMONOLOGY | Facility: CLINIC | Age: 86
End: 2021-10-28

## 2021-11-02 ENCOUNTER — APPOINTMENT (OUTPATIENT)
Dept: PULMONOLOGY | Facility: CLINIC | Age: 86
End: 2021-11-02
Payer: MEDICARE

## 2021-11-02 PROCEDURE — G0239: CPT

## 2021-11-04 ENCOUNTER — APPOINTMENT (OUTPATIENT)
Dept: PULMONOLOGY | Facility: CLINIC | Age: 86
End: 2021-11-04
Payer: MEDICARE

## 2021-11-04 PROCEDURE — G0239: CPT

## 2021-11-09 ENCOUNTER — APPOINTMENT (OUTPATIENT)
Dept: PULMONOLOGY | Facility: CLINIC | Age: 86
End: 2021-11-09
Payer: MEDICARE

## 2021-11-09 PROCEDURE — G0239: CPT

## 2021-11-11 ENCOUNTER — APPOINTMENT (OUTPATIENT)
Dept: PULMONOLOGY | Facility: CLINIC | Age: 86
End: 2021-11-11
Payer: MEDICARE

## 2021-11-11 PROCEDURE — G0239: CPT

## 2021-11-16 ENCOUNTER — APPOINTMENT (OUTPATIENT)
Dept: CT IMAGING | Facility: CLINIC | Age: 86
End: 2021-11-16

## 2021-11-18 ENCOUNTER — APPOINTMENT (OUTPATIENT)
Dept: PULMONOLOGY | Facility: CLINIC | Age: 86
End: 2021-11-18
Payer: MEDICARE

## 2021-11-18 PROCEDURE — G0239: CPT

## 2021-11-27 ENCOUNTER — OUTPATIENT (OUTPATIENT)
Dept: OUTPATIENT SERVICES | Facility: HOSPITAL | Age: 86
LOS: 1 days | End: 2021-11-27
Payer: MEDICARE

## 2021-11-27 ENCOUNTER — APPOINTMENT (OUTPATIENT)
Dept: CT IMAGING | Facility: CLINIC | Age: 86
End: 2021-11-27
Payer: MEDICARE

## 2021-11-27 DIAGNOSIS — Z98.890 OTHER SPECIFIED POSTPROCEDURAL STATES: Chronic | ICD-10-CM

## 2021-11-27 DIAGNOSIS — Z98.89 OTHER SPECIFIED POSTPROCEDURAL STATES: Chronic | ICD-10-CM

## 2021-11-27 DIAGNOSIS — Z98.49 CATARACT EXTRACTION STATUS, UNSPECIFIED EYE: Chronic | ICD-10-CM

## 2021-11-27 DIAGNOSIS — Z00.8 ENCOUNTER FOR OTHER GENERAL EXAMINATION: ICD-10-CM

## 2021-11-27 PROCEDURE — 71250 CT THORAX DX C-: CPT | Mod: 26,MH

## 2021-11-27 PROCEDURE — 71250 CT THORAX DX C-: CPT | Mod: MH

## 2021-11-30 ENCOUNTER — APPOINTMENT (OUTPATIENT)
Dept: PULMONOLOGY | Facility: CLINIC | Age: 86
End: 2021-11-30
Payer: MEDICARE

## 2021-11-30 PROCEDURE — G0239: CPT

## 2021-12-02 ENCOUNTER — APPOINTMENT (OUTPATIENT)
Dept: PULMONOLOGY | Facility: CLINIC | Age: 86
End: 2021-12-02

## 2021-12-07 ENCOUNTER — APPOINTMENT (OUTPATIENT)
Dept: PULMONOLOGY | Facility: CLINIC | Age: 86
End: 2021-12-07
Payer: MEDICARE

## 2021-12-07 PROCEDURE — ZZZZZ: CPT

## 2021-12-07 PROCEDURE — G0239: CPT

## 2021-12-09 ENCOUNTER — APPOINTMENT (OUTPATIENT)
Dept: PULMONOLOGY | Facility: CLINIC | Age: 86
End: 2021-12-09

## 2021-12-14 ENCOUNTER — APPOINTMENT (OUTPATIENT)
Dept: PULMONOLOGY | Facility: CLINIC | Age: 86
End: 2021-12-14

## 2021-12-16 ENCOUNTER — APPOINTMENT (OUTPATIENT)
Dept: PULMONOLOGY | Facility: CLINIC | Age: 86
End: 2021-12-16

## 2021-12-20 ENCOUNTER — OUTPATIENT (OUTPATIENT)
Dept: OUTPATIENT SERVICES | Facility: HOSPITAL | Age: 86
LOS: 1 days | End: 2021-12-20
Payer: MEDICARE

## 2021-12-20 ENCOUNTER — APPOINTMENT (OUTPATIENT)
Dept: ULTRASOUND IMAGING | Facility: CLINIC | Age: 86
End: 2021-12-20
Payer: MEDICARE

## 2021-12-20 DIAGNOSIS — Z98.49 CATARACT EXTRACTION STATUS, UNSPECIFIED EYE: Chronic | ICD-10-CM

## 2021-12-20 DIAGNOSIS — Z98.89 OTHER SPECIFIED POSTPROCEDURAL STATES: Chronic | ICD-10-CM

## 2021-12-20 DIAGNOSIS — Z98.890 OTHER SPECIFIED POSTPROCEDURAL STATES: Chronic | ICD-10-CM

## 2021-12-20 DIAGNOSIS — N18.4 CHRONIC KIDNEY DISEASE, STAGE 4 (SEVERE): ICD-10-CM

## 2021-12-20 PROCEDURE — 76775 US EXAM ABDO BACK WALL LIM: CPT

## 2021-12-20 PROCEDURE — 76775 US EXAM ABDO BACK WALL LIM: CPT | Mod: 26

## 2022-01-18 ENCOUNTER — APPOINTMENT (OUTPATIENT)
Dept: MAMMOGRAPHY | Facility: CLINIC | Age: 87
End: 2022-01-18
Payer: MEDICARE

## 2022-01-18 ENCOUNTER — APPOINTMENT (OUTPATIENT)
Dept: ULTRASOUND IMAGING | Facility: CLINIC | Age: 87
End: 2022-01-18
Payer: MEDICARE

## 2022-01-18 PROCEDURE — 77063 BREAST TOMOSYNTHESIS BI: CPT

## 2022-01-18 PROCEDURE — 77067 SCR MAMMO BI INCL CAD: CPT

## 2022-01-18 PROCEDURE — 76641 ULTRASOUND BREAST COMPLETE: CPT | Mod: 50

## 2022-04-14 ENCOUNTER — APPOINTMENT (OUTPATIENT)
Dept: PAIN MANAGEMENT | Facility: CLINIC | Age: 87
End: 2022-04-14
Payer: MEDICARE

## 2022-04-14 VITALS
BODY MASS INDEX: 21.62 KG/M2 | OXYGEN SATURATION: 98 % | SYSTOLIC BLOOD PRESSURE: 149 MMHG | DIASTOLIC BLOOD PRESSURE: 86 MMHG | HEART RATE: 78 BPM | WEIGHT: 122 LBS | TEMPERATURE: 98.4 F | HEIGHT: 63 IN

## 2022-04-14 DIAGNOSIS — G47.30 SLEEP APNEA, UNSPECIFIED: ICD-10-CM

## 2022-04-14 PROCEDURE — 99214 OFFICE O/P EST MOD 30 MIN: CPT

## 2022-04-18 NOTE — PHYSICAL EXAM
[Person] : oriented to person [Place] : oriented to place [Time] : oriented to time [Short Term Intact] : short term memory intact [Remote Intact] : remote memory intact [Registration Intact] : recent registration memory intact [Span Intact] : the attention span was normal [Concentration Intact] : normal concentrating ability [Visual Intact] : visual attention was ~T not ~L decreased [Fluency] : fluency intact [Current Events] : adequate knowledge of current events [Past History] : adequate knowledge of personal past history [Vocabulary] : adequate range of vocabulary [Cranial Nerves Facial (VII)] : face symmetrical [Cranial Nerves Vestibulocochlear (VIII)] : hearing was intact bilaterally [Cranial Nerves Glossopharyngeal (IX)] : tongue and palate midline [Cranial Nerves Hypoglossal (XII)] : there was no tongue deviation with protrusion [Motor Tone] : muscle tone was normal in all four extremities [Motor Strength] : muscle strength was normal in all four extremities [Involuntary Movements] : no involuntary movements were seen [No Muscle Atrophy] : normal bulk in all four extremities [Motor Handedness Right-Handed] : the patient is right hand dominant [Sensation Tactile Decrease] : light touch was intact [Balance] : balance was intact [Tremor] : a tremor present [Outer Ear] : the ears and nose were normal in appearance [Neck Cervical Mass (___cm)] : no neck mass was observed [Exaggerated Use Of Accessory Muscles For Inspiration] : no accessory muscle use [Abnormal Walk] : normal gait [Nail Clubbing] : no clubbing  or cyanosis of the fingernails [Musculoskeletal - Swelling] : no joint swelling seen [Skin Color & Pigmentation] : normal skin color and pigmentation [Skin Turgor] : normal skin turgor [] : no rash [Motor Strength Upper Extremities Bilaterally] : strength was normal in both upper extremities [Motor Strength Lower Extremities Bilaterally] : strength was normal in both lower extremities [Allodynia] : no ~T allodynia present [Limited Balance] : balance was intact [Past-pointing] : there was no past-pointing [Dysdiadochokinesia Bilaterally] : not present [FreeTextEntry8] : mild action tremor left worse than right. [FreeTextEntry1] : trace edema.

## 2022-04-18 NOTE — HISTORY OF PRESENT ILLNESS
[FreeTextEntry1] : Pt notes she had last seen me before her bovine heart surgery.\par Notes that following her valve replacememnt her migraines had "gone away."  She had felt much damien.\par Notes mild mitral valve disease but not to be treated.\par Comes today because she has been getting some hypnic headache.\par Wakes up with bad headache in early am- "enough." and has to get out of bed and then improves.\par More on right side and to top of head.  "different location than the migraine."  Sometimes to eye, top of head or into neck.\par Doesn't wake up feeling rested.\par Diagnosed with CKD and thinks that has been an issue with regards to headaches and her ability to use certain medicaitons.\par \par  [Headache] : headache [Dizziness] : dizziness [Neck Pain] : neck pain [Scalp Tenderness] : scalp tenderness [< 4 hours] : < 4 hours [stayed the same] : stayed the same [Stable] : The patient reports ~his/her~ symptoms since the last visit are stable [de-identified] : variable, dunia phillip

## 2022-04-18 NOTE — REVIEW OF SYSTEMS
[Fever] : no fever [Chills] : no chills [Feeling Poorly] : feeling poorly [Feeling Tired] : feeling tired [Eyesight Problems] : no eyesight problems [Nasal Discharge] : no nasal discharge [Chest Pain] : no chest pain [Palpitations] : no palpitations [Cough] : no cough [Arthralgias] : arthralgias [Neck Pain] : neck pain [Skin Lesions] : no skin lesions [Skin Wound] : no skin wound [Itching] : no itching [Convulsions] : no convulsions [Dizziness] : no dizziness [Fainting] : no fainting [As Noted in HPI] : as noted in HPI [Sleep Disturbances] : sleep disturbances [Muscle Weakness] : no muscle weakness

## 2022-04-26 ENCOUNTER — APPOINTMENT (OUTPATIENT)
Dept: NEUROLOGY | Facility: CLINIC | Age: 87
End: 2022-04-26

## 2022-05-21 ENCOUNTER — APPOINTMENT (OUTPATIENT)
Dept: MRI IMAGING | Facility: CLINIC | Age: 87
End: 2022-05-21
Payer: MEDICARE

## 2022-05-21 ENCOUNTER — OUTPATIENT (OUTPATIENT)
Dept: OUTPATIENT SERVICES | Facility: HOSPITAL | Age: 87
LOS: 1 days | End: 2022-05-21
Payer: MEDICARE

## 2022-05-21 DIAGNOSIS — Z98.890 OTHER SPECIFIED POSTPROCEDURAL STATES: Chronic | ICD-10-CM

## 2022-05-21 DIAGNOSIS — I67.1 CEREBRAL ANEURYSM, NONRUPTURED: ICD-10-CM

## 2022-05-21 DIAGNOSIS — Z98.89 OTHER SPECIFIED POSTPROCEDURAL STATES: Chronic | ICD-10-CM

## 2022-05-21 DIAGNOSIS — Z98.49 CATARACT EXTRACTION STATUS, UNSPECIFIED EYE: Chronic | ICD-10-CM

## 2022-05-21 PROCEDURE — G1004: CPT

## 2022-05-21 PROCEDURE — 70544 MR ANGIOGRAPHY HEAD W/O DYE: CPT | Mod: 26,ME

## 2022-05-21 PROCEDURE — 70544 MR ANGIOGRAPHY HEAD W/O DYE: CPT | Mod: ME

## 2022-06-02 ENCOUNTER — APPOINTMENT (OUTPATIENT)
Dept: NUCLEAR MEDICINE | Facility: IMAGING CENTER | Age: 87
End: 2022-06-02

## 2022-06-03 ENCOUNTER — NON-APPOINTMENT (OUTPATIENT)
Age: 87
End: 2022-06-03

## 2022-06-03 DIAGNOSIS — N18.4 CHRONIC KIDNEY DISEASE, STAGE 4 (SEVERE): ICD-10-CM

## 2022-06-06 ENCOUNTER — APPOINTMENT (OUTPATIENT)
Dept: NEUROLOGY | Facility: CLINIC | Age: 87
End: 2022-06-06
Payer: MEDICARE

## 2022-06-06 VITALS
DIASTOLIC BLOOD PRESSURE: 85 MMHG | SYSTOLIC BLOOD PRESSURE: 130 MMHG | HEIGHT: 63 IN | WEIGHT: 120 LBS | HEART RATE: 79 BPM | BODY MASS INDEX: 21.26 KG/M2

## 2022-06-06 VITALS
SYSTOLIC BLOOD PRESSURE: 130 MMHG | DIASTOLIC BLOOD PRESSURE: 85 MMHG | HEIGHT: 63 IN | BODY MASS INDEX: 21.26 KG/M2 | WEIGHT: 120 LBS | HEART RATE: 79 BPM

## 2022-06-06 PROBLEM — N18.4 STAGE 4 CHRONIC KIDNEY DISEASE: Status: ACTIVE | Noted: 2022-06-06

## 2022-06-06 PROCEDURE — 99215 OFFICE O/P EST HI 40 MIN: CPT

## 2022-06-06 RX ORDER — FAMOTIDINE 10 MG/1
TABLET, FILM COATED ORAL
Refills: 0 | Status: ACTIVE | COMMUNITY

## 2022-06-07 NOTE — PHYSICAL EXAM
[General Appearance - Alert] : alert [General Appearance - In No Acute Distress] : in no acute distress [Oriented To Time, Place, And Person] : oriented to person, place, and time [Impaired Insight] : insight and judgment were intact [Affect] : the affect was normal [Sclera] : the sclera and conjunctiva were normal [PERRL With Normal Accommodation] : pupils were equal in size, round, reactive to light, with normal accommodation [Extraocular Movements] : extraocular movements were intact [Outer Ear] : the ears and nose were normal in appearance [Oropharynx] : the oropharynx was normal [Neck Appearance] : the appearance of the neck was normal [Neck Cervical Mass (___cm)] : no neck mass was observed [Jugular Venous Distention Increased] : there was no jugular-venous distention [Thyroid Diffuse Enlargement] : the thyroid was not enlarged [Thyroid Nodule] : there were no palpable thyroid nodules [Auscultation Breath Sounds / Voice Sounds] : lungs were clear to auscultation bilaterally [Heart Rate And Rhythm] : heart rate was normal and rhythm regular [Heart Sounds] : normal S1 and S2 [Heart Sounds Gallop] : no gallops [Murmurs] : no murmurs [Heart Sounds Pericardial Friction Rub] : no pericardial rub [Edema] : there was no peripheral edema [Veins - Varicosity Changes] : there were no varicosital changes [No CVA Tenderness] : no ~M costovertebral angle tenderness [No Spinal Tenderness] : no spinal tenderness [Abnormal Walk] : normal gait [Nail Clubbing] : no clubbing  or cyanosis of the fingernails [Musculoskeletal - Swelling] : no joint swelling seen [Motor Tone] : muscle strength and tone were normal [Skin Color & Pigmentation] : normal skin color and pigmentation [Skin Turgor] : normal skin turgor [] : no rash [FreeTextEntry1] : soft left carotid bruit

## 2022-06-07 NOTE — CONSULT LETTER
[Dear  ___] : Dear  [unfilled], [Consult Letter:] : I had the pleasure of evaluating your patient, [unfilled]. [Please see my note below.] : Please see my note below. [Consult Closing:] : Thank you very much for allowing me to participate in the care of this patient.  If you have any questions, please do not hesitate to contact me. [Sincerely,] : Sincerely, [Richard B. Libman, MD, FRCP(C)] : Richard B. Libman, MD, FRCP(C) [Chief, Vascular Neurology] : Chief, Vascular Neurology [Director of Stroke Service, [facility]] : Director of Stroke Service, [facility] [Professor of Neurology] : Professor of Neurology [[facility]] : [facility] [FreeTextEntry2] : Tucker Perez M.D. [FreeTextEntry3] : Richard B. Libman, MD, FRCPC\par , Neurology \par Co-Director, Stroke Center\par Professor of Neurology\par [facility] [employer] [facility]\par

## 2022-06-07 NOTE — HISTORY OF PRESENT ILLNESS
[FreeTextEntry1] : 88-year-old ambidextrous lady \par Summary from  5/3/18\par She Came for an opinion regarding a right cavernous ICA aneurysm.\par \par Summary from Dr. 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 In 1/9/18, she was admitted to the hospital with urosepsis leading to E Coli bacteremia. She underwent work up including  for possible infective endocarditis. She was treated with ABx for total of 2 weeks. During the work up, she had CT brain performed due to unknown reasons. She denies any significant HAs at that time but reported to be confused/disoriented at that time due to infection/sepsis. She denies any significant head injury before her SAH but reports to have head injury about 2 weeks ago without any LOC. She reports to be taking warfarin for 3 months after the AVR surgery. Of note, she also reports to have history of migraines on Zomig.\par \par CT brain (1/10/18 12:12 AM): Subarachnoid hemorrhage noted in left high frontal and left fronto-parietal region\par \par MRA head (1/13/18): Small 2-3 mm aneurysm noted involving right cavernous left supraclinoid ICA.\par MRI brain (1/18): No evidence of acute infarct, previously noted subarachnoid hemorrhage showed complete resolution, an isolated micro-hemorrhage noted in the right high frontal region involving the cortex/juxta-cortical region\par \par 5/3/18. She came to the office today. She reports no new focal neurologic symptoms. She was told that she does not need to take aspirin from the standpoint of her bovine AVR\par \par Repeat MRI brain (4/18/18) to my eye  showed a small focus of susceptibility in the high right parietal cortex, consistent with either a chronic microhemorrhage, mineralization or a small cavernoma.\par Repeat MRA neck and head (4/18/18) showed a right cavernous carotid aneurysm, measured at 3.4 mm.\par \par 10/24/19. She Came to the Office Today. She has chronic, mild postural instability. 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 This service was provided using telehealth (video). The patient consented to this service.\par Location of patient: Home\par Location of provider: Office\par Names of all persons participating in the telehealth service and their role in the encounter: Patient\par \par She reports anxiety related to social isolation due to COVID 19 but she is coping.  She frequently awakens with brief right-sided headaches, diagnosed by Dr. Alegria as possible "hypnic headache".  No new focal neurologic symptoms.\par \par Carotid Doppler (5/21/2020) showed moderate heterogeneous plaque on the right with 40-60% stenosis by velocity criteria, 50% diameter reduction.  The right ICA was markedly tortuous.  The left ICA was tortuous but there was no plaque.  The extracranial vertebral arteries showed anterograde flow with a normal resistance pattern.  Heart rate was noted to be irregular.  Incidentally noted were bilateral complex thyroid nodules.\par \par Transcranial Doppler (5/21/2020) of the [place] was normal with no sign of stenosis.  Heart rate was noted to be irregular.\par \par Repeat MRA brain (6/24/2020) showed a right cavernous carotid aneurysm, measured at 2.7 x 2.2 mm, not significantly changed compared to the prior MRA.\par \par 5/13/2021. She came to the office today. She reports chronic lightheadedness, along with episodic "fogginess" and fatigue, possibly related to "reactive hypoglycemia". Otherwise no new focal neurologic symptoms.\par \par Repeat carotid Doppler (5/3/2021)  showed moderate heterogeneous plaque on the right with 40-60% stenosis by velocity criteria, 50% diameter reduction.  The right ICA was markedly tortuous.  The left ICA was tortuous but there was no plaque. Heart rate was noted to be irregular.  Incidentally noted were bilateral complex thyroid nodules.  Impression.  No significant change compared to 5/2020.\par \par Repeat transcranial Doppler (5/3/2021) was normal.  Heart rate was noted to be irregular.  Impression.  No significant change compared to 5/20.\par \par 6/6/22.  She came to the office today.  She has been diagnosed with CKD 4.  She feels generally weak, unsteady and "queasy" but without clear focal symptoms.  She has developed tremors which seem worse in the morning and improve as the day goes along.  This tremor interferes with putting on make-up but she otherwise remains functional and independent in ADLs.\par \par Repeat MRA brain (5/21/2022-compared to 6/24/2020) showed a stable right cavernous carotid aneurysm, measured at 2.7 x 2.4 mm.\par \par \par \par

## 2022-06-07 NOTE — DISCUSSION/SUMMARY
[FreeTextEntry1] : Summary from Dr. Temple of note of 3/26/18.\par  In 8/17, she underwent aortic valve replacement with bioprosthetic valve. In 1/18, she was admitted to OSH with urosepsis (E Coli). She reports to be confused/disoriented and febrile at the time of admission to OSH CT brain on 1/10/18  showed evidence of acute subarachnoid hemorrhage in left high frontal and left fronto-parietal region. MRI brain did not show any evidence of acute infarct and previously noted subarachnoid hemorrhage showed complete resolution; although incidentally showed an isolated micro-hemorrhage in the right high frontal region involving the cortex/juxta-cortical region. MRA head on 1/13/18 showed a 3 mm aneurysm of the right cavernous/supraclinoid ICA.  Transesophageal echo did not show any evidence of vegetations. \par \par Impression:\par 1. Nontraumatic left hemispheric (left high frontal and left frontal parietal region) subarachnoid hemorrhage of undetermined etiology; possible distal aneurysm, reversible cerebral vasoconstriction syndrome etc.\par 2. 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 She has mild postural instability, probably multifactorial, and otherwise she is neurologically intact.\par \par I suspect that her previous left-sided convexity subarachnoid hemorrhage was due to amyloid angiopathy.\par She has an incidental, unruptured small right cavernous carotid aneurysm. At that size and in that location, this aneurysm is of minimal risk to her. There is no role for treatment. She should continue her usual activities without restrictions. As a precaution, she could have repeat MRA in one year.\par \par 10/24/19. Overall she is neurologically stable and doing well. She has chronic, mild postural instability, probably multifactorial.  I gave her a prescription for physiotherapy.\par \par She should have repeat MRA brain in approximately 6 months to monitor her right cavernous carotid aneurysm for stability.\par \par 5/14/2020.  Overall she is neurologically stable and doing well.  She awakens with headaches diagnosed as possible "hypnic headache", and almost definitely unrelated to her small intracranial aneurysm.\par \par Carotid Doppler (5/21/2020) showed asymptomatic right carotid stenosis of about 50%, of no clinical significance.\par I defer to Dr. Perez's judgment as to whether the irregular heart rate, as well as thyroid nodules, incidentally detected on Doppler, warrant further investigation.\par \par Repeat MRA brain (6/24/2020) showed no significant change in the small right cavernous carotid aneurysm.  Given her age and the stability of the aneurysm, I do not believe that she will benefit from further routine monitoring of this aneurysm.\par \par 5/13/2021. Overall she is neurologically stable and doing well.\par \par Dopplers show stable, moderate, asymptomatic right carotid stenosis of about 50%, for which there is no role for revascularization.\par \par She has daytime fatigue and snoring, raising the possibility of sleep apnea, and I have requested a home sleep study\par \par I advised her to follow-up with you to ensure optimal blood pressure control.\par \par 6/6/22.  Overall she is neurologically stable.  She has a mild-moderate postural and action tremor which interferes slightly with her activities such as putting on make-up but otherwise is nondisabling.  It is unclear to me whether this tremor is related to her chronic kidney disease.  If the tremor becomes disabling, then there may be a role for pharmacological treatment.\par \par Repeat MRA brain (5/21/2022) showed a stable right cavernous carotid aneurysm, 2.7 mm in greatest diameter. As previously noted, given her age, follow-up imaging is not mandatory, but as a precaution, she can have repeat MRA brain in about 2 years, i.e. about 5/24. \par \par She has significant daytime fatigue, again raising the possibility of sleep apnea and I have again referred her for a home sleep study.\par \par She has significant low back pain and I have referred her for a physiatry evaluation.\par \par She can followup with me in about 6 months. I hope that she remains free of serious trouble.\par

## 2022-06-21 ENCOUNTER — APPOINTMENT (OUTPATIENT)
Dept: NUCLEAR MEDICINE | Facility: IMAGING CENTER | Age: 87
End: 2022-06-21

## 2022-06-21 ENCOUNTER — OUTPATIENT (OUTPATIENT)
Dept: OUTPATIENT SERVICES | Facility: HOSPITAL | Age: 87
LOS: 1 days | End: 2022-06-21
Payer: MEDICARE

## 2022-06-21 DIAGNOSIS — Z00.8 ENCOUNTER FOR OTHER GENERAL EXAMINATION: ICD-10-CM

## 2022-06-21 DIAGNOSIS — Z98.890 OTHER SPECIFIED POSTPROCEDURAL STATES: Chronic | ICD-10-CM

## 2022-06-21 DIAGNOSIS — Z98.49 CATARACT EXTRACTION STATUS, UNSPECIFIED EYE: Chronic | ICD-10-CM

## 2022-06-21 DIAGNOSIS — Z98.89 OTHER SPECIFIED POSTPROCEDURAL STATES: Chronic | ICD-10-CM

## 2022-06-21 PROCEDURE — 78830 RP LOCLZJ TUM SPECT W/CT 1: CPT | Mod: MH

## 2022-06-21 PROCEDURE — 78830 RP LOCLZJ TUM SPECT W/CT 1: CPT | Mod: 26,MH

## 2022-06-21 PROCEDURE — A9538: CPT

## 2022-06-21 PROCEDURE — 78800 RP LOCLZJ TUM 1 AREA 1 D IMG: CPT

## 2022-06-23 ENCOUNTER — APPOINTMENT (OUTPATIENT)
Dept: NEUROLOGY | Facility: CLINIC | Age: 87
End: 2022-06-23

## 2022-07-12 ENCOUNTER — APPOINTMENT (OUTPATIENT)
Dept: PHYSICAL MEDICINE AND REHAB | Facility: CLINIC | Age: 87
End: 2022-07-12

## 2022-07-12 VITALS — OXYGEN SATURATION: 99 % | SYSTOLIC BLOOD PRESSURE: 173 MMHG | DIASTOLIC BLOOD PRESSURE: 81 MMHG | HEART RATE: 79 BPM

## 2022-07-12 PROCEDURE — 99203 OFFICE O/P NEW LOW 30 MIN: CPT

## 2022-07-12 NOTE — HISTORY OF PRESENT ILLNESS
[FreeTextEntry1] : Referred from Dr. Libman for LBP. - he follows for Cavernoma\par \par Reports "tremendous amount" of LBP for several years and getting worse.\par Non-traumatic.\par Better when she sits down.\par Non-radiating.\par Achy in nature.\par Does not take any medicines.\par Has had PT. \par Had CAROLINE- December 2020- did not help. (Dr. randall) \par Lost 4" in height. \par \par \par \par Follows w Dr. Perez for HTN and Amyloid. gets dyspnea w exertion.

## 2022-07-12 NOTE — ASSESSMENT
[FreeTextEntry1] : - Patient w pain due to lumbar stenosis. Already tried PT with  no help. Most meds contraindicated due to Amyloid and kidney dz. Not a good candidate for surgery. Referred for evaluation for CAROLINE . f/u with me prn. Discussed plan and answered questions. Discussed with patient red flags such as bladder/bowel incontinence, weakness, significant increase in pain level in which case patient should immediately present to Emergency Room.\par

## 2022-07-12 NOTE — DATA REVIEWED
[MRI] : MRI [FreeTextEntry1] : MRI 12/12/20- Marked levoscolios of the lower thoracic and upper lumbar sine with moderate stenosis at L2-3 and mild degenerative spinal stenosis L3-4 due to the scoliosis. Grade 1 anterolisthesis on L4 on L5.

## 2022-07-12 NOTE — PHYSICAL EXAM
[Normal] : Oriented to person, place, and time, insight and judgement were intact and the affect was normal [de-identified] : slight dyspnea [de-identified] : well perfused [de-identified] : nml lumb flexion dec extension [de-identified] : motor 5/5, DTRs trace, sensation intact

## 2022-07-29 ENCOUNTER — OUTPATIENT (OUTPATIENT)
Dept: OUTPATIENT SERVICES | Facility: HOSPITAL | Age: 87
LOS: 1 days | Discharge: HOME | End: 2022-07-29

## 2022-07-29 DIAGNOSIS — Z98.890 OTHER SPECIFIED POSTPROCEDURAL STATES: Chronic | ICD-10-CM

## 2022-07-29 DIAGNOSIS — D47.2 MONOCLONAL GAMMOPATHY: ICD-10-CM

## 2022-07-29 DIAGNOSIS — D64.9 ANEMIA, UNSPECIFIED: ICD-10-CM

## 2022-07-29 DIAGNOSIS — Z98.49 CATARACT EXTRACTION STATUS, UNSPECIFIED EYE: Chronic | ICD-10-CM

## 2022-07-29 DIAGNOSIS — Z98.89 OTHER SPECIFIED POSTPROCEDURAL STATES: Chronic | ICD-10-CM

## 2022-08-09 ENCOUNTER — APPOINTMENT (OUTPATIENT)
Dept: HEMATOLOGY ONCOLOGY | Facility: CLINIC | Age: 87
End: 2022-08-09

## 2022-08-09 ENCOUNTER — RESULT REVIEW (OUTPATIENT)
Age: 87
End: 2022-08-09

## 2022-08-09 ENCOUNTER — LABORATORY RESULT (OUTPATIENT)
Age: 87
End: 2022-08-09

## 2022-08-09 VITALS
DIASTOLIC BLOOD PRESSURE: 85 MMHG | HEIGHT: 62 IN | BODY MASS INDEX: 21.91 KG/M2 | TEMPERATURE: 97.5 F | SYSTOLIC BLOOD PRESSURE: 176 MMHG | OXYGEN SATURATION: 97 % | HEART RATE: 75 BPM | WEIGHT: 119.05 LBS | RESPIRATION RATE: 16 BRPM

## 2022-08-09 DIAGNOSIS — D47.2 MONOCLONAL GAMMOPATHY: ICD-10-CM

## 2022-08-09 DIAGNOSIS — D64.9 ANEMIA, UNSPECIFIED: ICD-10-CM

## 2022-08-09 DIAGNOSIS — Z78.9 OTHER SPECIFIED HEALTH STATUS: ICD-10-CM

## 2022-08-09 LAB
BASOPHILS # BLD AUTO: 0.03 K/UL — SIGNIFICANT CHANGE UP (ref 0–0.2)
BASOPHILS NFR BLD AUTO: 0.5 % — SIGNIFICANT CHANGE UP (ref 0–2)
EOSINOPHIL # BLD AUTO: 0.2 K/UL — SIGNIFICANT CHANGE UP (ref 0–0.5)
EOSINOPHIL NFR BLD AUTO: 3.3 % — SIGNIFICANT CHANGE UP (ref 0–6)
HCT VFR BLD CALC: 31.8 % — LOW (ref 34.5–45)
HGB BLD-MCNC: 10.8 G/DL — LOW (ref 11.5–15.5)
IMM GRANULOCYTES NFR BLD AUTO: 0.5 % — SIGNIFICANT CHANGE UP (ref 0–1.5)
LYMPHOCYTES # BLD AUTO: 0.74 K/UL — LOW (ref 1–3.3)
LYMPHOCYTES # BLD AUTO: 12.1 % — LOW (ref 13–44)
MCHC RBC-ENTMCNC: 32.6 PG — SIGNIFICANT CHANGE UP (ref 27–34)
MCHC RBC-ENTMCNC: 34 G/DL — SIGNIFICANT CHANGE UP (ref 32–36)
MCV RBC AUTO: 96.1 FL — SIGNIFICANT CHANGE UP (ref 80–100)
MONOCYTES # BLD AUTO: 0.55 K/UL — SIGNIFICANT CHANGE UP (ref 0–0.9)
MONOCYTES NFR BLD AUTO: 9 % — SIGNIFICANT CHANGE UP (ref 2–14)
NEUTROPHILS # BLD AUTO: 4.59 K/UL — SIGNIFICANT CHANGE UP (ref 1.8–7.4)
NEUTROPHILS NFR BLD AUTO: 74.6 % — SIGNIFICANT CHANGE UP (ref 43–77)
NRBC # BLD: 0 /100 WBCS — SIGNIFICANT CHANGE UP (ref 0–0)
PLATELET # BLD AUTO: 172 K/UL — SIGNIFICANT CHANGE UP (ref 150–400)
RBC # BLD: 3.31 M/UL — LOW (ref 3.8–5.2)
RBC # FLD: 13.7 % — SIGNIFICANT CHANGE UP (ref 10.3–14.5)
WBC # BLD: 6.14 K/UL — SIGNIFICANT CHANGE UP (ref 3.8–10.5)
WBC # FLD AUTO: 6.14 K/UL — SIGNIFICANT CHANGE UP (ref 3.8–10.5)

## 2022-08-09 PROCEDURE — 99205 OFFICE O/P NEW HI 60 MIN: CPT

## 2022-08-11 LAB
ALBUMIN SERPL ELPH-MCNC: 4.4 G/DL
ALP BLD-CCNC: 68 U/L
ALT SERPL-CCNC: 13 U/L
ANION GAP SERPL CALC-SCNC: 15 MMOL/L
AST SERPL-CCNC: 14 U/L
BILIRUB SERPL-MCNC: 0.4 MG/DL
BUN SERPL-MCNC: 76 MG/DL
CALCIUM SERPL-MCNC: 9.5 MG/DL
CHLORIDE SERPL-SCNC: 105 MMOL/L
CO2 SERPL-SCNC: 20 MMOL/L
CREAT SERPL-MCNC: 3.92 MG/DL
DEPRECATED KAPPA LC FREE/LAMBDA SER: 6.51 RATIO
EGFR: 11 ML/MIN/1.73M2
FERRITIN SERPL-MCNC: 152 NG/ML
FOLATE SERPL-MCNC: >20 NG/ML
GLUCOSE SERPL-MCNC: 104 MG/DL
IRON SATN MFR SERPL: 22 %
IRON SERPL-MCNC: 61 UG/DL
KAPPA LC CSF-MCNC: 2.65 MG/DL
KAPPA LC SERPL-MCNC: 17.24 MG/DL
LDH SERPL-CCNC: 234 U/L
POTASSIUM SERPL-SCNC: 4.3 MMOL/L
PROT SERPL-MCNC: 7.4 G/DL
SODIUM SERPL-SCNC: 140 MMOL/L
TIBC SERPL-MCNC: 282 UG/DL
UIBC SERPL-MCNC: 220 UG/DL
VIT B12 SERPL-MCNC: 1147 PG/ML

## 2022-08-12 ENCOUNTER — RESULT REVIEW (OUTPATIENT)
Age: 87
End: 2022-08-12

## 2022-08-12 PROBLEM — Z78.9 GRAVIDA 2 PARA 2: Status: RESOLVED | Noted: 2022-08-12 | Resolved: 2022-08-12

## 2022-08-12 LAB — CRYOGLOB SERPL-MCNC: NEGATIVE

## 2022-08-16 LAB — HEMATOPATHOLOGY REPORT: SIGNIFICANT CHANGE UP

## 2022-08-16 NOTE — HISTORY OF PRESENT ILLNESS
[Disease:__________________________] : Disease: [unfilled] [de-identified] : Melanoma\par ONEIL\par CRF [de-identified] : 4/21 BM: +t(11;14) [de-identified] : 88-year-old female requesting second opinion consultation regarding anemia and monoclonal gammopathy of uncertain significance.  She has known of her monoclonal gammopathy for at least 20 years.  She also has chronic kidney disease of unclear etiology.  She has never had a renal biopsy.  In April 2021, bone marrow examination had revealed 5 to 7% plasma cells,  positive.  Iron stores were present.  Karyotype was normal.  Flow cytometry revealed only rare plasma cells.  FISH was positive for t(11; 14).  In October 2021, hemoglobin was 11.1, creatinine 3.08, and serum protein electrophoresis normal.  In June 2022, a nuclear medicine cardiac amyloidosis scan was negative.  In July 2022, hemoglobin 10.6, creatinine 3.82, and eGFR 11.\par \par She feels very tired.  She has dyspnea on exertion after walking 10 feet for the past 6 months. She has an occasional dry cough. Her stamina is poor.  She is often nauseated.  She feels that she is in a fog.  She has occasional migraine headaches.  She notes no visual problems, chest pain, abdominal pain, melena, rectal bleeding, hematuria, vaginal bleeding, jaundice, dark urine, swollen glands, bleeding, bruising, leg swelling, leg pain, fever, night sweats.  She has lost 5 pounds on a diet over the past few months.  She notes diffuse pruritus.  She has had chronic low back pain for many years related to her scoliosis.

## 2022-08-16 NOTE — ASSESSMENT
[Palliative Care Plan] : not applicable at this time [FreeTextEntry1] : 88-year-old female with an approximately 20-year history of monoclonal gammopathy who also has chronic kidney disease of unknown etiology and mild anemia.  Her symptomatology is out of proportion to her degree of anemia.  I suspect much of her symptomatology is due to her chronic renal insufficiency.  I will reevaluate her monoclonal gammopathy.  Consideration should be given to her potentially having monoclonal gammopathy with renal significance.\par \par Plan:\par Obtain bone marrow pathology slides for review\par CMP, LDH,SPEP, SPIEP, SFLC, cryoglobulins\par Urine protein electrophoresis/Bence-Aleman protein\par Iron/TIBC/Sat, B12/folate levels\par Dermatology consult\par Renal f/u\par Call me in 3 weeks

## 2022-08-16 NOTE — CONSULT LETTER
[Dear  ___] : Dear ~ALLEN, [Consult Letter:] : I had the pleasure of evaluating your patient, [unfilled]. [Please see my note below.] : Please see my note below. [Consult Closing:] : Thank you very much for allowing me to participate in the care of this patient.  If you have any questions, please do not hesitate to contact me. [Sincerely,] : Sincerely, [DrOsiris  ___] : Dr. BYRD [FreeTextEntry2] : Rafael Everett MD [FreeTextEntry3] : Sonido\par Brown Rothman M.D., FACP\par Professor of Medicine\par [facility] [employer] [facility]\par Associate Chief, Division of Hematology\par [facility]\par [facility]\par [address] [place] [address]\[place] [address]\par [phone]\par \par \par \par

## 2022-08-16 NOTE — REVIEW OF SYSTEMS
[Fatigue] : fatigue [Recent Change In Weight] : ~T recent weight change [Cough] : cough [SOB on Exertion] : shortness of breath during exertion [Joint Pain] : joint pain [Negative] : Allergic/Immunologic [FreeTextEntry7] : Nausea [de-identified] : Pruritus [de-identified] : HA

## 2022-08-16 NOTE — PHYSICAL EXAM
Please contact pt and review recent labs and recommendations. Letter printed to mail to pt with results. New pt--she came with her mom. [Restricted in physically strenuous activity but ambulatory and able to carry out work of a light or sedentary nature] : Status 1- Restricted in physically strenuous activity but ambulatory and able to carry out work of a light or sedentary nature, e.g., light house work, office work [Normal] : affect appropriate [de-identified] : RRR. S1S2 normal. Gr 2/6 SACHI RUSB to LUSB. No gallops. [de-identified] : Scoliosis [de-identified] : Senile purpura on sternum. Macular eythema with flaking skin on right medial buttock.

## 2022-08-18 LAB
ALBUMIN MFR SERPL ELPH: 56.2 %
ALBUMIN SERPL-MCNC: 4.2 G/DL
ALBUMIN/GLOB SERPL: 1.3 RATIO
ALBUPE: 52.8 %
ALPHA1 GLOB MFR SERPL ELPH: 3.5 %
ALPHA1 GLOB SERPL ELPH-MCNC: 0.3 G/DL
ALPHA1UPE: 14.4 %
ALPHA2 GLOB MFR SERPL ELPH: 10.5 %
ALPHA2 GLOB SERPL ELPH-MCNC: 0.8 G/DL
ALPHA2UPE: 9.1 %
B-GLOBULIN MFR SERPL ELPH: 16.8 %
B-GLOBULIN SERPL ELPH-MCNC: 1.2 G/DL
BETAUPE: 16.2 %
DEPRECATED KAPPA LC FREE/LAMBDA SER: 6.51 RATIO
GAMMA GLOB FLD ELPH-MCNC: 1 G/DL
GAMMA GLOB MFR SERPL ELPH: 13 %
GAMMAUPE: 7.5 %
IGA 24H UR QL IFE: NORMAL
IGA SER QL IEP: 679 MG/DL
IGG SER QL IEP: 953 MG/DL
IGM SER QL IEP: 44 MG/DL
INTERPRETATION SERPL IEP-IMP: NORMAL
KAPPA LC 24H UR QL: PRESENT
KAPPA LC CSF-MCNC: 2.65 MG/DL
KAPPA LC SERPL-MCNC: 17.24 MG/DL
M PROTEIN MFR SERPL ELPH: 8.2 %
M PROTEIN SPEC IFE-MCNC: NORMAL
M SPIKE RU: 2.5 %
MONOCLON BAND OBS SERPL: 0.6 G/DL
PROT PATTERN 24H UR ELPH-IMP: NORMAL
PROT SERPL-MCNC: 7.4 G/DL
PROT SERPL-MCNC: 7.4 G/DL
PROT UR-MCNC: 96 MG/DL
PROT UR-MCNC: 96 MG/DL

## 2022-09-11 ENCOUNTER — NON-APPOINTMENT (OUTPATIENT)
Age: 87
End: 2022-09-11

## 2022-09-15 ENCOUNTER — APPOINTMENT (OUTPATIENT)
Dept: PAIN MANAGEMENT | Facility: CLINIC | Age: 87
End: 2022-09-15

## 2022-09-15 VITALS
WEIGHT: 119 LBS | BODY MASS INDEX: 21.9 KG/M2 | HEART RATE: 72 BPM | SYSTOLIC BLOOD PRESSURE: 149 MMHG | DIASTOLIC BLOOD PRESSURE: 75 MMHG | HEIGHT: 62 IN

## 2022-09-15 DIAGNOSIS — G44.85 PRIMARY STABBING HEADACHE: ICD-10-CM

## 2022-09-15 PROCEDURE — 99214 OFFICE O/P EST MOD 30 MIN: CPT

## 2022-09-21 ENCOUNTER — APPOINTMENT (OUTPATIENT)
Dept: PHYSICAL MEDICINE AND REHAB | Facility: CLINIC | Age: 87
End: 2022-09-21

## 2022-09-21 DIAGNOSIS — G89.29 LOW BACK PAIN, UNSPECIFIED: ICD-10-CM

## 2022-09-21 DIAGNOSIS — M54.50 LOW BACK PAIN, UNSPECIFIED: ICD-10-CM

## 2022-09-21 DIAGNOSIS — M41.9 SCOLIOSIS, UNSPECIFIED: ICD-10-CM

## 2022-09-21 DIAGNOSIS — M48.061 SPINAL STENOSIS, LUMBAR REGION WITHOUT NEUROGENIC CLAUDICATION: ICD-10-CM

## 2022-09-21 PROCEDURE — 99213 OFFICE O/P EST LOW 20 MIN: CPT

## 2022-09-21 RX ORDER — KETOCONAZOLE 20 MG/G
2 CREAM TOPICAL
Qty: 15 | Refills: 0 | Status: DISCONTINUED | COMMUNITY
Start: 2022-07-28 | End: 2022-09-21

## 2022-09-21 RX ORDER — RIMEGEPANT SULFATE 75 MG/75MG
75 TABLET, ORALLY DISINTEGRATING ORAL
Qty: 1 | Refills: 3 | Status: DISCONTINUED | COMMUNITY
Start: 2022-09-15 | End: 2022-09-21

## 2022-09-21 NOTE — ASSESSMENT
[FreeTextEntry1] : 88 year old female with low back pain secondary to scoliosis.  Given the nature of the patient's complaints and lack of significant improvement following more conservative measures, we will obtain MRI lumbar spine to help delineate the exact etiology of the patient's pain.  The patient will return to see me once the study is complete so that we may review the results and discuss further treatment options.\par

## 2022-09-21 NOTE — REVIEW OF SYSTEMS
[Shortness Of Breath] : shortness of breath [Negative] : Heme/Lymph [FreeTextEntry6] : hx of ONEIL and bronchiectasis

## 2022-09-21 NOTE — DATA REVIEWED
[MRI] : MRI [FreeTextEntry1] : EXAM: MR SPINE LUMBAR\par \par \par PROCEDURE DATE: 12/12/2020\par \par \par \par INTERPRETATION: CLINICAL INDICATION: Low back pain, spinal stenosis\par \par Magnetic resonance imaging of the lumbosacral spine was carried out with sagittal surface coil imaging from T11 to S4 using T1 and fast spin echo T2 weighted images with and without fat saturation technique with axial T1 and fast spin echo T2 weighted imaging on a 1.5 Lima magnet.\par \par Comparison is made with the prior MRI of 7/21/2019 and demonstrates no significant interval change.\par \par There is a marked lower thoracic upper lumbar levoscoliosis. There is a grade 1 anterolisthesis of L4 on L5. There is fusion at the L5-S1 disc spaces.\par \par L1-2 is unremarkable.\par \par At L2-3 there is moderate degenerative spinal stenosis due to the scoliosis.\par \par At L3-4 there is mild degenerative spinal stenosis due to the scoliosis.\par \par L4-5 is a grade 1 anterolisthesis of L4 on L5. This left foraminal stenosis gliosis.\par \par L5-S1 is unremarkable.\par \par The conus medullaris terminates normally at the L1 level. The paraspinal soft tissues are unremarkable.\par \par IMPRESSION: Marked levoscoliosis of the lower thoracic and upper lumbar spine with moderate spinal stenosis L2-3 and mild degenerative spinal stenosis L3-4 due to the scoliosis. Grade 1 anterolisthesis of L4 on L5. No change from 7/21/2019\par \par \par \par MARIYA YANEZ MD, ATTENDING RADIOLOGIST\par This document has been electronically signed. Dec 14 2020 4:02PM\par

## 2022-09-21 NOTE — HISTORY OF PRESENT ILLNESS
[Back] : back [___ yrs] : [unfilled] year(s) ago [8] : a maximum pain level of 8/10 [Dull] : dull [Aching] : aching [Walking] : walking [Sitting] : sitting [Gait Dysfunction] : gait dysfunction [PT] : PT [Injections] : injections [FreeTextEntry6] : had 1 epidural injection a few years ago which didn't help

## 2022-09-22 NOTE — ASSESSMENT
[facility]  [facility] [place]      Pediatric Call Center Scheduling and Nurse Questions:  [phone]  Alison Fontaine, RN Care Coordinator    After hours urgent matters that cannot wait until the next business day:  [phone].  Ask for the on-call pediatric doctor for the specialty you are calling for be paged.    For dermatology urgent matters that cannot wait until the next business day, is over a holiday and/or a weekend please call [phone] and ask for the Dermatology Resident On-Call to be paged.    Prescription Renewals:  Please call your pharmacy first.  Your pharmacy must fax requests to [phone].  Please allow 2-3 days for prescriptions to be authorized.    If your physician has ordered a CT or MRI, you may schedule this test by calling [facility] in [place] at [phone].    **If your child is having a sedated procedure, they will need a history and physical done at their Primary Care Provider within 30 days of the procedure.  If your child was seen by the ordering provider in our office within 30 days of the procedure, their visit summary will work for the H&P unless they inform you otherwise.  If you have any questions, please call the RN Care Coordinator.**     [FreeTextEntry1] : Would give trial of Nurtec prn\par consider return to [facility].

## 2022-09-22 NOTE — HISTORY OF PRESENT ILLNESS
[Headache] : headache [Dizziness] : dizziness [Neck Pain] : neck pain [Scalp Tenderness] : scalp tenderness [> 15 days per month] : > 15 days per month [Worsened] : The patient reports ~his/her~ symptoms since the last visit have worsened [FreeTextEntry1] : Pt notes that since her last visit she has had worsening renal function.\par She does note that she had been doing better after 2017 when she had open heart surgery done and Botox at that time.  Had felt she was headache free for "a number of years."  \par Noted to have stable right cavernous carotid aneurysm measured at 2.7 by 2.4 mm.  Had been followed by Dr. Libman for her brain MRA showing carotid aneurysm.\par Remains mildly tremulous and overall unsteady and weak.\par Is considering visit with Dr. Mistry for steroid epidural for LBP but is awaiting feedback from Dr. Rothman.\par We discussed role of aneurysm and the risk with use of triptan and quality of life issues.\par Does have pain in right side and down into neck.

## 2022-09-22 NOTE — REVIEW OF SYSTEMS
[Fever] : no fever [Chills] : no chills [Feeling Poorly] : feeling poorly [Feeling Tired] : feeling tired [Eye Pain] : eye pain [Eyesight Problems] : no eyesight problems [Nasal Discharge] : no nasal discharge [Chest Pain] : no chest pain [Cough] : no cough [Arthralgias] : arthralgias [Neck Pain] : neck pain [Itching] : no itching [Fainting] : no fainting [As Noted in HPI] : as noted in HPI [Muscle Weakness] : no muscle weakness

## 2022-10-06 ENCOUNTER — APPOINTMENT (OUTPATIENT)
Dept: NEUROLOGY | Facility: CLINIC | Age: 87
End: 2022-10-06

## 2022-10-06 PROCEDURE — 93892 TCD EMBOLI DETECT W/O INJ: CPT

## 2022-10-06 PROCEDURE — 93880 EXTRACRANIAL BILAT STUDY: CPT

## 2022-10-06 PROCEDURE — 93886 INTRACRANIAL COMPLETE STUDY: CPT

## 2022-10-11 ENCOUNTER — NON-APPOINTMENT (OUTPATIENT)
Age: 87
End: 2022-10-11

## 2022-10-18 ENCOUNTER — NON-APPOINTMENT (OUTPATIENT)
Age: 87
End: 2022-10-18

## 2022-10-20 ENCOUNTER — NON-APPOINTMENT (OUTPATIENT)
Age: 87
End: 2022-10-20

## 2022-10-27 ENCOUNTER — APPOINTMENT (OUTPATIENT)
Dept: NUCLEAR MEDICINE | Facility: IMAGING CENTER | Age: 87
End: 2022-10-27

## 2022-11-17 ENCOUNTER — APPOINTMENT (OUTPATIENT)
Dept: NEUROLOGY | Facility: CLINIC | Age: 87
End: 2022-11-17

## 2022-12-29 ENCOUNTER — APPOINTMENT (OUTPATIENT)
Dept: NEUROLOGY | Facility: CLINIC | Age: 87
End: 2022-12-29
Payer: MEDICARE

## 2022-12-29 VITALS
DIASTOLIC BLOOD PRESSURE: 77 MMHG | HEIGHT: 62 IN | HEART RATE: 66 BPM | WEIGHT: 116 LBS | SYSTOLIC BLOOD PRESSURE: 149 MMHG | BODY MASS INDEX: 21.35 KG/M2

## 2022-12-29 DIAGNOSIS — I60.9 NONTRAUMATIC SUBARACHNOID HEMORRHAGE, UNSPECIFIED: ICD-10-CM

## 2022-12-29 PROCEDURE — 99215 OFFICE O/P EST HI 40 MIN: CPT

## 2022-12-29 RX ORDER — UBROGEPANT 100 MG/1
100 TABLET ORAL
Qty: 1 | Refills: 3 | Status: DISCONTINUED | COMMUNITY
Start: 2022-09-22 | End: 2022-12-29

## 2022-12-29 NOTE — CONSULT LETTER
[Dear  ___] : Dear  [unfilled], [Consult Letter:] : I had the pleasure of evaluating your patient, [unfilled]. [Please see my note below.] : Please see my note below. [Consult Closing:] : Thank you very much for allowing me to participate in the care of this patient.  If you have any questions, please do not hesitate to contact me. [Sincerely,] : Sincerely, [FreeTextEntry2] : Dr. Tucker Perez [FreeTextEntry3] : Richard B. Libman, MD, FRCPC \par , Neurology \par Co-Director, Stroke Center\par Professor of Neurology\par [facility] [employer] at [facility]\par

## 2022-12-29 NOTE — PHYSICAL EXAM
[General Appearance - Alert] : alert [General Appearance - In No Acute Distress] : in no acute distress [Oriented To Time, Place, And Person] : oriented to person, place, and time [Impaired Insight] : insight and judgment were intact [Affect] : the affect was normal [Sclera] : the sclera and conjunctiva were normal [PERRL With Normal Accommodation] : pupils were equal in size, round, reactive to light, with normal accommodation [Extraocular Movements] : extraocular movements were intact [Outer Ear] : the ears and nose were normal in appearance [Oropharynx] : the oropharynx was normal [Neck Appearance] : the appearance of the neck was normal [Neck Cervical Mass (___cm)] : no neck mass was observed [Jugular Venous Distention Increased] : there was no jugular-venous distention [Thyroid Diffuse Enlargement] : the thyroid was not enlarged [Thyroid Nodule] : there were no palpable thyroid nodules [Auscultation Breath Sounds / Voice Sounds] : lungs were clear to auscultation bilaterally [Heart Rate And Rhythm] : heart rate was normal and rhythm regular [Heart Sounds] : normal S1 and S2 [Heart Sounds Gallop] : no gallops [Heart Sounds Pericardial Friction Rub] : no pericardial rub [Edema] : there was no peripheral edema [Veins - Varicosity Changes] : there were no varicosital changes [No CVA Tenderness] : no ~M costovertebral angle tenderness [No Spinal Tenderness] : no spinal tenderness [Nail Clubbing] : no clubbing  or cyanosis of the fingernails [Musculoskeletal - Swelling] : no joint swelling seen [Motor Tone] : muscle strength and tone were normal [Skin Color & Pigmentation] : normal skin color and pigmentation [Skin Turgor] : normal skin turgor [] : no rash [FreeTextEntry1] : soft left carotid bruit

## 2022-12-29 NOTE — REVIEW OF SYSTEMS
[Feeling Tired] : feeling tired [Recent Weight Loss (___ Lbs)] : recent [unfilled] ~Ulb weight loss [As Noted in HPI] : as noted in HPI [Shortness Of Breath] : shortness of breath [SOB on Exertion] : shortness of breath during exertion [Itching] : itching [Negative] : Heme/Lymph [FreeTextEntry2] : Probable snoring [FreeTextEntry9] : Chronic low back pain, recently exacerbated [de-identified] : Pruritus  [de-identified] : Episodes of fatigue, mental fogginess and hunger, related to "reactive hypoglycemia"

## 2022-12-29 NOTE — DISCUSSION/SUMMARY
[FreeTextEntry1] : Summary from Dr. Temple of note of 3/26/18.\par  In 8/17, she underwent aortic valve replacement with bioprosthetic valve. In 1/18, she was admitted to OSH with urosepsis (E Coli). She reports to be confused/disoriented and febrile at the time of admission to OSH CT brain on 1/10/18  showed evidence of acute subarachnoid hemorrhage in left high frontal and left fronto-parietal region. MRI brain did not show any evidence of acute infarct and previously noted subarachnoid hemorrhage showed complete resolution; although incidentally showed an isolated micro-hemorrhage in the right high frontal region involving the cortex/juxta-cortical region. MRA head on 1/13/18 showed a 3 mm aneurysm of the right cavernous/supraclinoid ICA.  Transesophageal echo did not show any evidence of vegetations. \par \par Impression:\par 1. Nontraumatic left hemispheric (left high frontal and left frontal parietal region) subarachnoid hemorrhage of undetermined etiology; possible distal aneurysm, reversible cerebral vasoconstriction syndrome etc.\par 2. 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 She has mild postural instability, probably multifactorial, and otherwise she is neurologically intact.\par \par I suspect that her previous left-sided convexity subarachnoid hemorrhage was due to amyloid angiopathy.\par She has an incidental, unruptured small right cavernous carotid aneurysm. At that size and in that location, this aneurysm is of minimal risk to her. There is no role for treatment. She should continue her usual activities without restrictions. As a precaution, she could have repeat MRA in one year.\par \par 10/24/19. Overall she is neurologically stable and doing well. She has chronic, mild postural instability, probably multifactorial.  I gave her a prescription for physiotherapy.\par \par She should have repeat MRA brain in approximately 6 months to monitor her right cavernous carotid aneurysm for stability.\par \par 5/14/2020.  Overall she is neurologically stable and doing well.  She awakens with headaches diagnosed as possible "hypnic headache", and almost definitely unrelated to her small intracranial aneurysm.\par \par Carotid Doppler (5/21/2020) showed asymptomatic right carotid stenosis of about 50%, of no clinical significance.\par I defer to Dr. Perez's judgment as to whether the irregular heart rate, as well as thyroid nodules, incidentally detected on Doppler, warrant further investigation.\par \par Repeat MRA brain (6/24/2020) showed no significant change in the small right cavernous carotid aneurysm.  Given her age and the stability of the aneurysm, I do not believe that she will benefit from further routine monitoring of this aneurysm.\par \par 5/13/2021. Overall she is neurologically stable and doing well.\par \par Dopplers show stable, moderate, asymptomatic right carotid stenosis of about 50%, for which there is no role for revascularization.\par \par She has daytime fatigue and snoring, raising the possibility of sleep apnea, and I have requested a home sleep study\par \par I advised her to follow-up with you to ensure optimal blood pressure control.\par \par 6/6/22.  Overall she is neurologically stable.  She has a mild-moderate postural and action tremor which interferes slightly with her activities such as putting on make-up but otherwise is nondisabling.  It is unclear to me whether this tremor is related to her chronic kidney disease.  If the tremor becomes disabling, then there may be a role for pharmacological treatment.\par \par Repeat MRA brain (5/21/2022) showed a stable right cavernous carotid aneurysm, 2.7 mm in greatest diameter. As previously noted, given her age, follow-up imaging is not mandatory, but as a precaution, she can have repeat MRA brain in about 2 years, i.e. about 5/24. \par \par She has significant daytime fatigue, again raising the possibility of sleep apnea and I have again referred her for a home sleep study.\par \par She has significant low back pain and I have referred her for a physiatry evaluation.\par \par 12/29/22\par - Overall she is neurologically stable, with renal and possibly pulmonary problems taking priority at present\par - Dopplers on 10/6/22 revealed stable moderate right ICA stenosis, 40-60%. She develop a small increase in left ICA stenosis; still mild, <40%.\par - She has significant daytime fatigue, again raising the possibility of sleep apnea and I have again requested a home sleep study.\par \par She can followup in one year with repeat Dopplers. I hope that she remains free of serious trouble.\par

## 2022-12-29 NOTE — HISTORY OF PRESENT ILLNESS
[FreeTextEntry1] : 88-year-old ambidextrous lady \par Summary from  5/3/18\par She Came for an opinion regarding a right cavernous ICA aneurysm.\par \par Summary from Dr. 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 In 1/9/18, she was admitted to the hospital with urosepsis leading to E Coli bacteremia. She underwent work up including  for possible infective endocarditis. She was treated with ABx for total of 2 weeks. During the work up, she had CT brain performed due to unknown reasons. She denies any significant HAs at that time but reported to be confused/disoriented at that time due to infection/sepsis. She denies any significant head injury before her SAH but reports to have head injury about 2 weeks ago without any LOC. She reports to be taking warfarin for 3 months after the AVR surgery. Of note, she also reports to have history of migraines on Zomig.\par \par CT brain (1/10/18 12:12 AM): Subarachnoid hemorrhage noted in left high frontal and left fronto-parietal region\par \par MRA head (1/13/18): Small 2-3 mm aneurysm noted involving right cavernous left supraclinoid ICA.\par MRI brain (1/18): No evidence of acute infarct, previously noted subarachnoid hemorrhage showed complete resolution, an isolated micro-hemorrhage noted in the right high frontal region involving the cortex/juxta-cortical region\par \par 5/3/18. She came to the office today. She reports no new focal neurologic symptoms. She was told that she does not need to take aspirin from the standpoint of her bovine AVR\par \par Repeat MRI brain (4/18/18) to my eye  showed a small focus of susceptibility in the high right parietal cortex, consistent with either a chronic microhemorrhage, mineralization or a small cavernoma.\par Repeat MRA neck and head (4/18/18) showed a right cavernous carotid aneurysm, measured at 3.4 mm.\par \par 10/24/19. She Came to the Office Today. She has chronic, mild postural instability. 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 This service was provided using telehealth (video). The patient consented to this service.\par Location of patient: Home\par Location of provider: Office\par Names of all persons participating in the telehealth service and their role in the encounter: Patient\par \par She reports anxiety related to social isolation due to COVID 19 but she is coping.  She frequently awakens with brief right-sided headaches, diagnosed by Dr. Alegria as possible "hypnic headache".  No new focal neurologic symptoms.\par \par Carotid Doppler (5/21/2020) showed moderate heterogeneous plaque on the right with 40-60% stenosis by velocity criteria, 50% diameter reduction.  The right ICA was markedly tortuous.  The left ICA was tortuous but there was no plaque.  The extracranial vertebral arteries showed anterograde flow with a normal resistance pattern.  Heart rate was noted to be irregular.  Incidentally noted were bilateral complex thyroid nodules.\par \par Transcranial Doppler (5/21/2020) of the [place] was normal with no sign of stenosis.  Heart rate was noted to be irregular.\par \par Repeat MRA brain (6/24/2020) showed a right cavernous carotid aneurysm, measured at 2.7 x 2.2 mm, not significantly changed compared to the prior MRA.\par \par 5/13/2021. She came to the office today. She reports chronic lightheadedness, along with episodic "fogginess" and fatigue, possibly related to "reactive hypoglycemia". Otherwise no new focal neurologic symptoms.\par \par Repeat carotid Doppler (5/3/2021)  showed moderate heterogeneous plaque on the right with 40-60% stenosis by velocity criteria, 50% diameter reduction.  The right ICA was markedly tortuous.  The left ICA was tortuous but there was no plaque. Heart rate was noted to be irregular.  Incidentally noted were bilateral complex thyroid nodules.  Impression.  No significant change compared to 5/2020.\par \par Repeat transcranial Doppler (5/3/2021) was normal.  Heart rate was noted to be irregular.  Impression.  No significant change compared to 5/20.\par \par 6/6/22.  She came to the office today.  She has been diagnosed with CKD 4.  She feels generally weak, unsteady and "queasy" but without clear focal symptoms.  She has developed tremors which seem worse in the morning and improve as the day goes along.  This tremor interferes with putting on make-up but she otherwise remains functional and independent in ADLs.\par \par Repeat MRA brain (5/21/2022-compared to 6/24/2020) showed a stable right cavernous carotid aneurysm, measured at 2.7 x 2.4 mm.\par \par 12/29/22 She came to the office today. She has been dealing with multiple medical concerns, CKD, shortness of breath and low back pain. She denies any focal neurologic symptoms.\par \par Repeat carotid Doppler (10/6/2022) showed moderate heterogeneous plaque on the right with 40-60% stenosis by velocity criteria, 50% diameter reduction; on the left there was mild heterogeneous plaque with less than 40% stenosis. Incidentally noted were bilateral thyroid nodules. Impression. Very mild increase in left ICA stenosis compared to 5/21.\par \par Repeat transcranial Doppler (10/6/2022) was normal. \par \par PCP Dr. Tucker Perez, [facility]

## 2023-02-10 ENCOUNTER — APPOINTMENT (OUTPATIENT)
Dept: ULTRASOUND IMAGING | Facility: CLINIC | Age: 88
End: 2023-02-10

## 2023-02-10 ENCOUNTER — APPOINTMENT (OUTPATIENT)
Dept: MAMMOGRAPHY | Facility: CLINIC | Age: 88
End: 2023-02-10

## 2023-03-21 ENCOUNTER — APPOINTMENT (OUTPATIENT)
Dept: MAMMOGRAPHY | Facility: CLINIC | Age: 88
End: 2023-03-21

## 2023-03-21 ENCOUNTER — APPOINTMENT (OUTPATIENT)
Dept: ULTRASOUND IMAGING | Facility: CLINIC | Age: 88
End: 2023-03-21

## 2023-06-01 ENCOUNTER — INPATIENT (INPATIENT)
Facility: HOSPITAL | Age: 88
LOS: 2 days | Discharge: HOME | DRG: 286 | End: 2023-06-04
Attending: INTERNAL MEDICINE | Admitting: INTERNAL MEDICINE
Payer: MEDICARE

## 2023-06-01 VITALS
OXYGEN SATURATION: 99 % | HEART RATE: 88 BPM | RESPIRATION RATE: 16 BRPM | TEMPERATURE: 98 F | SYSTOLIC BLOOD PRESSURE: 135 MMHG | DIASTOLIC BLOOD PRESSURE: 79 MMHG

## 2023-06-01 DIAGNOSIS — Z98.49 CATARACT EXTRACTION STATUS, UNSPECIFIED EYE: Chronic | ICD-10-CM

## 2023-06-01 DIAGNOSIS — R63.4 ABNORMAL WEIGHT LOSS: ICD-10-CM

## 2023-06-01 DIAGNOSIS — Z98.89 OTHER SPECIFIED POSTPROCEDURAL STATES: Chronic | ICD-10-CM

## 2023-06-01 DIAGNOSIS — Z98.890 OTHER SPECIFIED POSTPROCEDURAL STATES: Chronic | ICD-10-CM

## 2023-06-01 DIAGNOSIS — I10 ESSENTIAL (PRIMARY) HYPERTENSION: ICD-10-CM

## 2023-06-01 DIAGNOSIS — R91.8 OTHER NONSPECIFIC ABNORMAL FINDING OF LUNG FIELD: ICD-10-CM

## 2023-06-01 DIAGNOSIS — R07.9 CHEST PAIN, UNSPECIFIED: ICD-10-CM

## 2023-06-01 DIAGNOSIS — Z29.9 ENCOUNTER FOR PROPHYLACTIC MEASURES, UNSPECIFIED: ICD-10-CM

## 2023-06-01 DIAGNOSIS — N18.6 END STAGE RENAL DISEASE: ICD-10-CM

## 2023-06-01 LAB
ALBUMIN SERPL ELPH-MCNC: 4.1 G/DL — SIGNIFICANT CHANGE UP (ref 3.3–5)
ALP SERPL-CCNC: 90 U/L — SIGNIFICANT CHANGE UP (ref 40–120)
ALT FLD-CCNC: 11 U/L — SIGNIFICANT CHANGE UP (ref 10–45)
ANION GAP SERPL CALC-SCNC: 15 MMOL/L — SIGNIFICANT CHANGE UP (ref 5–17)
APPEARANCE UR: CLEAR — SIGNIFICANT CHANGE UP
APTT BLD: 29.1 SEC — SIGNIFICANT CHANGE UP (ref 27.5–35.5)
AST SERPL-CCNC: 18 U/L — SIGNIFICANT CHANGE UP (ref 10–40)
BACTERIA # UR AUTO: NEGATIVE — SIGNIFICANT CHANGE UP
BASOPHILS # BLD AUTO: 0 K/UL — SIGNIFICANT CHANGE UP (ref 0–0.2)
BASOPHILS NFR BLD AUTO: 0 % — SIGNIFICANT CHANGE UP (ref 0–2)
BILIRUB SERPL-MCNC: 0.6 MG/DL — SIGNIFICANT CHANGE UP (ref 0.2–1.2)
BILIRUB UR-MCNC: NEGATIVE — SIGNIFICANT CHANGE UP
BUN SERPL-MCNC: 22 MG/DL — SIGNIFICANT CHANGE UP (ref 7–23)
CALCIUM SERPL-MCNC: 9.2 MG/DL — SIGNIFICANT CHANGE UP (ref 8.4–10.5)
CHLORIDE SERPL-SCNC: 100 MMOL/L — SIGNIFICANT CHANGE UP (ref 96–108)
CO2 SERPL-SCNC: 23 MMOL/L — SIGNIFICANT CHANGE UP (ref 22–31)
COLOR SPEC: COLORLESS — SIGNIFICANT CHANGE UP
CREAT SERPL-MCNC: 2.02 MG/DL — HIGH (ref 0.5–1.3)
DIFF PNL FLD: NEGATIVE — SIGNIFICANT CHANGE UP
EGFR: 23 ML/MIN/1.73M2 — LOW
EOSINOPHIL # BLD AUTO: 0 K/UL — SIGNIFICANT CHANGE UP (ref 0–0.5)
EOSINOPHIL NFR BLD AUTO: 0 % — SIGNIFICANT CHANGE UP (ref 0–6)
EPI CELLS # UR: 0 /HPF — SIGNIFICANT CHANGE UP
GLUCOSE SERPL-MCNC: 103 MG/DL — HIGH (ref 70–99)
GLUCOSE UR QL: NEGATIVE — SIGNIFICANT CHANGE UP
HCT VFR BLD CALC: 38 % — SIGNIFICANT CHANGE UP (ref 34.5–45)
HGB BLD-MCNC: 12.1 G/DL — SIGNIFICANT CHANGE UP (ref 11.5–15.5)
HYALINE CASTS # UR AUTO: 0 /LPF — SIGNIFICANT CHANGE UP (ref 0–2)
INR BLD: 0.96 RATIO — SIGNIFICANT CHANGE UP (ref 0.88–1.16)
KETONES UR-MCNC: NEGATIVE — SIGNIFICANT CHANGE UP
LEUKOCYTE ESTERASE UR-ACNC: NEGATIVE — SIGNIFICANT CHANGE UP
LIDOCAIN IGE QN: 43 U/L — SIGNIFICANT CHANGE UP (ref 7–60)
LYMPHOCYTES # BLD AUTO: 0.33 K/UL — LOW (ref 1–3.3)
LYMPHOCYTES # BLD AUTO: 6.1 % — LOW (ref 13–44)
MAGNESIUM SERPL-MCNC: 2.2 MG/DL — SIGNIFICANT CHANGE UP (ref 1.6–2.6)
MANUAL SMEAR VERIFICATION: SIGNIFICANT CHANGE UP
MCHC RBC-ENTMCNC: 31.8 GM/DL — LOW (ref 32–36)
MCHC RBC-ENTMCNC: 31.9 PG — SIGNIFICANT CHANGE UP (ref 27–34)
MCV RBC AUTO: 100.3 FL — HIGH (ref 80–100)
MONOCYTES # BLD AUTO: 0.42 K/UL — SIGNIFICANT CHANGE UP (ref 0–0.9)
MONOCYTES NFR BLD AUTO: 7.8 % — SIGNIFICANT CHANGE UP (ref 2–14)
NEUTROPHILS # BLD AUTO: 4.65 K/UL — SIGNIFICANT CHANGE UP (ref 1.8–7.4)
NEUTROPHILS NFR BLD AUTO: 86.1 % — HIGH (ref 43–77)
NITRITE UR-MCNC: NEGATIVE — SIGNIFICANT CHANGE UP
NT-PROBNP SERPL-SCNC: 6844 PG/ML — HIGH (ref 0–300)
PH UR: 8 — SIGNIFICANT CHANGE UP (ref 5–8)
PLAT MORPH BLD: NORMAL — SIGNIFICANT CHANGE UP
PLATELET # BLD AUTO: 116 K/UL — LOW (ref 150–400)
POTASSIUM SERPL-MCNC: 4 MMOL/L — SIGNIFICANT CHANGE UP (ref 3.5–5.3)
POTASSIUM SERPL-SCNC: 4 MMOL/L — SIGNIFICANT CHANGE UP (ref 3.5–5.3)
PROT SERPL-MCNC: 7.2 G/DL — SIGNIFICANT CHANGE UP (ref 6–8.3)
PROT UR-MCNC: ABNORMAL
PROTHROM AB SERPL-ACNC: 11.1 SEC — SIGNIFICANT CHANGE UP (ref 10.5–13.4)
RBC # BLD: 3.79 M/UL — LOW (ref 3.8–5.2)
RBC # FLD: 14 % — SIGNIFICANT CHANGE UP (ref 10.3–14.5)
RBC BLD AUTO: SIGNIFICANT CHANGE UP
RBC CASTS # UR COMP ASSIST: 0 /HPF — SIGNIFICANT CHANGE UP (ref 0–4)
SODIUM SERPL-SCNC: 138 MMOL/L — SIGNIFICANT CHANGE UP (ref 135–145)
SP GR SPEC: 1 — LOW (ref 1.01–1.02)
TROPONIN T, HIGH SENSITIVITY RESULT: 53 NG/L — HIGH (ref 0–51)
TROPONIN T, HIGH SENSITIVITY RESULT: 76 NG/L — HIGH (ref 0–51)
UROBILINOGEN FLD QL: NEGATIVE — SIGNIFICANT CHANGE UP
WBC # BLD: 5.4 K/UL — SIGNIFICANT CHANGE UP (ref 3.8–10.5)
WBC # FLD AUTO: 5.4 K/UL — SIGNIFICANT CHANGE UP (ref 3.8–10.5)
WBC UR QL: 1 /HPF — SIGNIFICANT CHANGE UP (ref 0–5)

## 2023-06-01 PROCEDURE — 71045 X-RAY EXAM CHEST 1 VIEW: CPT | Mod: 26

## 2023-06-01 PROCEDURE — 99223 1ST HOSP IP/OBS HIGH 75: CPT

## 2023-06-01 PROCEDURE — 70450 CT HEAD/BRAIN W/O DYE: CPT | Mod: 26,MA

## 2023-06-01 PROCEDURE — 99285 EMERGENCY DEPT VISIT HI MDM: CPT | Mod: FS

## 2023-06-01 RX ORDER — ASPIRIN/CALCIUM CARB/MAGNESIUM 324 MG
81 TABLET ORAL DAILY
Refills: 0 | Status: DISCONTINUED | OUTPATIENT
Start: 2023-06-02 | End: 2023-06-04

## 2023-06-01 RX ORDER — CHOLECALCIFEROL (VITAMIN D3) 125 MCG
1 CAPSULE ORAL
Qty: 0 | Refills: 0 | DISCHARGE

## 2023-06-01 RX ORDER — ACETAMINOPHEN 500 MG
650 TABLET ORAL EVERY 6 HOURS
Refills: 0 | Status: DISCONTINUED | OUTPATIENT
Start: 2023-06-01 | End: 2023-06-04

## 2023-06-01 RX ORDER — METOPROLOL TARTRATE 50 MG
12.5 TABLET ORAL
Refills: 0 | Status: DISCONTINUED | OUTPATIENT
Start: 2023-06-01 | End: 2023-06-04

## 2023-06-01 RX ORDER — ASCORBIC ACID 60 MG
1 TABLET,CHEWABLE ORAL
Qty: 0 | Refills: 0 | DISCHARGE

## 2023-06-01 RX ORDER — PREGABALIN 225 MG/1
1 CAPSULE ORAL
Qty: 0 | Refills: 0 | DISCHARGE

## 2023-06-01 RX ORDER — AMLODIPINE BESYLATE 2.5 MG/1
2.5 TABLET ORAL AT BEDTIME
Refills: 0 | Status: DISCONTINUED | OUTPATIENT
Start: 2023-06-01 | End: 2023-06-04

## 2023-06-01 RX ORDER — HEPARIN SODIUM 5000 [USP'U]/ML
5000 INJECTION INTRAVENOUS; SUBCUTANEOUS EVERY 12 HOURS
Refills: 0 | Status: DISCONTINUED | OUTPATIENT
Start: 2023-06-01 | End: 2023-06-04

## 2023-06-01 RX ORDER — UBIDECARENONE 100 MG
1 CAPSULE ORAL
Qty: 0 | Refills: 0 | DISCHARGE

## 2023-06-01 RX ORDER — ZOLMITRIPTAN 5 MG/1
0 TABLET ORAL
Qty: 0 | Refills: 0 | DISCHARGE

## 2023-06-01 RX ADMIN — AMLODIPINE BESYLATE 2.5 MILLIGRAM(S): 2.5 TABLET ORAL at 23:16

## 2023-06-01 RX ADMIN — Medication 12.5 MILLIGRAM(S): at 23:16

## 2023-06-01 RX ADMIN — Medication 650 MILLIGRAM(S): at 23:50

## 2023-06-01 RX ADMIN — Medication 650 MILLIGRAM(S): at 23:16

## 2023-06-01 NOTE — ED PROVIDER NOTE - NSICDXPASTSURGICALHX_GEN_ALL_CORE_FT
PAST SURGICAL HISTORY:  Cataract extraction status     H/O colonoscopy     S/P rotator cuff repair left

## 2023-06-01 NOTE — H&P ADULT - NSHPSOCIALHISTORY_GEN_ALL_CORE
No primary tobacco use (possible second hand exposure from spouse/father).  NO ethanol.  .   Supportive daughter.

## 2023-06-01 NOTE — ED PROVIDER NOTE - NSICDXFAMILYHX_GEN_ALL_CORE_FT
FAMILY HISTORY:  Father  Still living? Unknown  FH: CAD (coronary artery disease), Age at diagnosis: Age Unknown  FH: MI (myocardial infarction), Age at diagnosis: Age Unknown

## 2023-06-01 NOTE — PATIENT PROFILE ADULT - FALL HARM RISK - HARM RISK INTERVENTIONS
Assistance with ambulation/Assistance OOB with selected safe patient handling equipment/Communicate Risk of Fall with Harm to all staff/Discuss with provider need for PT consult/Monitor gait and stability/Provide patient with walking aids - walker, cane, crutches/Reinforce activity limits and safety measures with patient and family/Sit up slowly, dangle for a short time, stand at bedside before walking/Tailored Fall Risk Interventions/Visual Cue: Yellow wristband and red socks/Bed in lowest position, wheels locked, appropriate side rails in place/Call bell, personal items and telephone in reach/Instruct patient to call for assistance before getting out of bed or chair/Non-slip footwear when patient is out of bed/[place] to call system/Physically safe environment - no spills, clutter or unnecessary equipment/Purposeful Proactive Rounding/Room/bathroom lighting operational, light cord in reach

## 2023-06-01 NOTE — ED PROVIDER NOTE - OBJECTIVE STATEMENT
88 y/o female with PMHx of ESRD on dialysis (Tues, thurs, sat), aortic valve replacement presents to the ED complaining of chest pain during dialysis session today. Patient states that she was doing dialysis. She got up to go to the bathroom and had sudden onset of generalized weakness come over her associated with mid sternal chest pain that radiated to both arms. Both arms felt very heavy and weak. She felt as though she would fall down. Someone helped her into a wheel chair and gave her aspirin and nitroglycerin. She then reports chest pain resolved. The pain/heaviness to the arms is persistent. She reports mild headache currently. 2

## 2023-06-01 NOTE — H&P ADULT - ASSESSMENT
NIGHT HOSPITALIST:   NIGHT HOSPITALIST:    Presentation of patient with chest pain syndrome with B/L arm weakness/tingling in the setting of patient with past aortic valve replacement and nonobstructive CAD, ESRD on HD with troponin suspected from troponin leak but will assume cardiac equivalent for now>>will trend troponin and CK mass study.   Will obtain an echo.   Doubt aortic catastrophic event.    Patient with B/L nodules on chest radiograph presumably from patient's known ONEIL but patient refuses a non contrast CTT of the chest.    Nephrology to be contacted for arrangement of interval HD sessions while inpatient.    Patient agrees to pharmacologic DVT prophylaxis. NIGHT HOSPITALIST:    Presentation of patient with chest pain syndrome with B/L arm weakness/tingling in the setting of patient with past aortic valve replacement and nonobstructive CAD, ESRD on HD with troponin suspected from troponin leak but will assume cardiac equivalent for now>>will trend troponin and CK mass study.   Will obtain an echo.   Doubt aortic catastrophic event.    Patient with B/L nodules on chest radiograph presumably from patient's known ONEIL but patient refuses a non contrast CTT of the chest.    Nephrology to be contacted for arrangement of interval HD sessions while inpatient.    Will continue patient's Lopressor, Norvasc.    Would consider clarifying in the AM if the patient has had a recent outpatient workup for weight loss.    Patient agrees to pharmacologic DVT prophylaxis. NIGHT HOSPITALIST:    Presentation of patient with chest pain syndrome with B/L arm weakness/tingling in the setting of patient with past aortic valve replacement and nonobstructive CAD, ESRD on HD with troponin suspected from troponin leak but will assume cardiac equivalent for now>>will trend troponin and CK mass study.   Will obtain an echo.   Doubt aortic catastrophic event.    Patient with B/L nodules on chest radiograph presumably from patient's known ONEIL but patient refuses a non contrast CTT of the chest.   Daughter in attendance and defers to patient's decision.    Nephrology to be contacted for arrangement of interval HD sessions while inpatient.    Will continue patient's Lopressor, Norvasc.    Would consider clarifying in the AM if the patient has had a recent outpatient workup for weight loss.    Patient agrees to pharmacologic DVT prophylaxis.

## 2023-06-01 NOTE — H&P ADULT - TIME BILLING
Review of prior medical records, evaluation and management and coordination of care with provider team.

## 2023-06-01 NOTE — H&P ADULT - NSHPREVIEWOFSYSTEMS_GEN_ALL_CORE
NO HA, no focal weakness.  NO cough, no hemoptysis.   Poor exercise tolerance.  NO fever, no chills, no rigors.  NO breast symptoms  NO rash.    NO abdominal pain, no red blood per rectum or melena.  + weight loss.  NO vaginal bleeding.  NO dysuria, no hematuria.  NO joint pain.  NO SI/HI>  NO thyroid symptoms.    Patient reports COVID1-9 vaccine x 4.

## 2023-06-01 NOTE — H&P ADULT - NSICDXPASTMEDICALHX_GEN_ALL_CORE_FT
PAST MEDICAL HISTORY:  Bronchiolectasis     ESRD on hemodialysis     Migraine     Mitral valve prolapse     Nonrheumatic aortic (valve) stenosis     Palpitations     Scoliosis unspecified, unspecified site

## 2023-06-01 NOTE — H&P ADULT - PROBLEM SELECTOR PLAN 3
See above.   Presumably from patient's history of bronchiectasis and ONEIL>>patient refuses CTT chest no contrast.    Would consider contacting patient's pulmonologist in the AM of patient's admission.

## 2023-06-01 NOTE — ED ADULT NURSE NOTE - OBJECTIVE STATEMENT
Pt is an 89y F with PMH of aortic valve replacement (2017) and ESRD on dialysis (tues, thurs, sat) complaining of chest pain during dialysis this morning. Pt reports walking to restroom when pt felt sudden sharp sternal chest pain, dizziness, and pain in upper extremities bilaterally. Pt also reports SOB. Pt received 81mg ASA and 1 dose of nitroglycerin sublingual at dialysis. Upon assessment, pt denies chest pain, SOB, reports discomfort in upper extremities and mild nausea. Vitals WNL. A&Ox4, belongings at bedside.

## 2023-06-01 NOTE — ED PROVIDER NOTE - NSICDXPASTMEDICALHX_GEN_ALL_CORE_FT
PAST MEDICAL HISTORY:  Bronchiolectasis     Elevated serum creatinine baseline  renal Md [place]    Migraine     Mitral valve prolapse     Nonrheumatic aortic (valve) stenosis     Palpitations     Scoliosis unspecified, unspecified site

## 2023-06-01 NOTE — H&P ADULT - HISTORY OF PRESENT ILLNESS
NIGHT HOSPITALIST:    Patient UNKNOWN to me previously, assigned to me at this point via the ER and by Dr. Perez to admit this 89 y/o independent F--with a history of ESRD on HD (T/TH/S)  with etiology of ESRD unclear (reported history of MGUS), S/P aortic valve replacement at [place] in Aug 2017 following severe aortic insufficiency, bronchiectasis, history of ONEIL followed by pulmonary as above, essential HTN maintained on Lopressor and Norvasc, with the patient self referring to the ER following episodes of substernal chest pressure during a truncated session of HD this AM, with B/L arm weakness during the episode with tingling sensation.   Patient denies chest pain/pressure/arm symptoms at present.   Patient observed to be walking in the ER with assistance by staff with patient denies symptoms.  NO cough, but poor exercise tolerance, notes dyspnoea with changing clothes.  No back pain, no tearing back pain.  NO N/V.  Patient does report unspecified weight loss past year.  No diaphoresis. NIGHT HOSPITALIST:    Patient UNKNOWN to me previously, assigned to me at this point via the ER and by Dr. Perez to admit this 89 y/o independent F--with a history of ESRD on HD (T/TH/S)  with etiology of ESRD unclear (reported history of MGUS), S/P aortic valve replacement at [place] in Aug 2017 following severe aortic insufficiency, bronchiectasis, history of ONEIL followed by pulmonary as above, essential HTN maintained on Lopressor and Norvasc, with the patient self referring to the ER following episodes of substernal chest pressure during a truncated session of HD this AM, with B/L arm weakness during the episode with tingling sensation.   Received full dose ASA and nitrate at the HD center.  Patient denies chest pain/pressure/arm symptoms at present.   Patient observed to be walking in the ER with assistance by staff with patient denies symptoms.  NO cough, but poor exercise tolerance, notes dyspnoea with changing clothes.  No back pain, no tearing back pain.  NO N/V.  Patient does report unspecified weight loss past year.  No diaphoresis.

## 2023-06-01 NOTE — ED PROVIDER NOTE - DIFFERENTIAL DIAGNOSIS
Ddx includes, however, is not limited to: ACS, MSK pain, GERD, arrhythmia, electrolyte abnormality, other Differential Diagnosis

## 2023-06-01 NOTE — ED PROVIDER NOTE - PHYSICAL EXAMINATION
CONSTITUTIONAL: Patient is awake, alert and oriented x 3. Patient is well appearing and in no acute distress.  HEAD: NCAT  ENT: Airway patent, Nasal mucosa clear.  NECK: Supple,   LUNGS: CTA B/L  HEART: RRR.+S1S2   ABDOMEN: Soft, non-tender to palpation throughout all four quadrants  MSK: No edema or calf tenderness b/l, FROM upper and lower ext b/l,   SKIN: No rash or lesions  NEURO: CN 3-12 grossly intact, No focal deficits, Strength5/5 UE and LE b/l; Sensation intact;

## 2023-06-01 NOTE — H&P ADULT - NSHPSOURCEINFOTX_GEN_ALL_CORE
Adult daughter in attendance with patient's permission. Adult daughter in attendance with patient's permission.  Reviewed Medex with patient via patient recall.

## 2023-06-01 NOTE — H&P ADULT - NSHPLABSRESULTS_GEN_ALL_CORE
EKG tracing interpreted by me with NSR at 87 with LAE, LAD.    Chest radiograph interpreted by me with multiple B/L pulmonary nodules>>reviewed with the patient who refuses noncontrast CTT chest.    WBC 5.4  86N    Hgb 12.1    Platelets of 116K    HS troponin 53>>76    Random glucose of 103.    Cr 2.02  K+ 4.0    Mg++2.2    CTT head>>negative.    BNP 6844    UA with 30 protein.

## 2023-06-01 NOTE — ED ADULT NURSE NOTE - NSFALLRISKINTERV_ED_ALL_ED

## 2023-06-01 NOTE — ED PROVIDER NOTE - PROGRESS NOTE DETAILS
Received call from patients doctor Dr. Tucker Perez and discussed case. Patient is pending ct brain. He stated that as long as CT scan is unremarkable patient can be admitted to his service. Requests that we contact hospitalist to write H&P. Guillermina Moore PA-C hospitalist notified. Will admit to Dr. Perez. Guillermina Moore PA-C

## 2023-06-01 NOTE — H&P ADULT - NSHPADDITIONALINFOADULT_GEN_ALL_CORE
NIGHT HOSPITALIST:    Patient/ daughter in attendance aware of course and agree with plan/care as above.   Given patient's comorbidities, patient's long term prognosis is guarded.   Emotional support provided to patient/daughter.   The patient is not yet ready to discuss advance directives.   Care reviewed with covering NP/PA for endorsement to Dr. Perez.    Terrance Lorenzo MD  Available on [employer] Teams.

## 2023-06-01 NOTE — ED PROVIDER NOTE - CLINICAL SUMMARY MEDICAL DECISION MAKING FREE TEXT BOX
MANDY Do MD: 88 y/o F with ESRD on HD T/Th/Sat, Ao valve replacement who p/w sudden onset of CP that began when she got up at HD to go to the bathroom, followed by pain down b/l arms. No back pain. CP resolved with NTG, arm pain resolved. Plan: cardiac w/u, TBA for further eval/mgt

## 2023-06-01 NOTE — ED ADULT NURSE NOTE - CAS EDP DISCH DISPOSITION ADMI
Called patient to inform him that his Viagra Rx from [facility] has arrived.    He will come to pick this up.    Jayson Hernandez PharmJamalD.    
Telemetry

## 2023-06-02 LAB
ANION GAP SERPL CALC-SCNC: 14 MMOL/L — SIGNIFICANT CHANGE UP (ref 5–17)
BASOPHILS # BLD AUTO: 0.03 K/UL — SIGNIFICANT CHANGE UP (ref 0–0.2)
BASOPHILS NFR BLD AUTO: 0.6 % — SIGNIFICANT CHANGE UP (ref 0–2)
BUN SERPL-MCNC: 32 MG/DL — HIGH (ref 7–23)
CALCIUM SERPL-MCNC: 9.3 MG/DL — SIGNIFICANT CHANGE UP (ref 8.4–10.5)
CHLORIDE SERPL-SCNC: 105 MMOL/L — SIGNIFICANT CHANGE UP (ref 96–108)
CK MB CFR SERPL CALC: 1.6 NG/ML — SIGNIFICANT CHANGE UP (ref 0–3.8)
CO2 SERPL-SCNC: 20 MMOL/L — LOW (ref 22–31)
CREAT SERPL-MCNC: 3.14 MG/DL — HIGH (ref 0.5–1.3)
EGFR: 14 ML/MIN/1.73M2 — LOW
EOSINOPHIL # BLD AUTO: 0.49 K/UL — SIGNIFICANT CHANGE UP (ref 0–0.5)
EOSINOPHIL NFR BLD AUTO: 10 % — HIGH (ref 0–6)
GLUCOSE SERPL-MCNC: 81 MG/DL — SIGNIFICANT CHANGE UP (ref 70–99)
HBV SURFACE AG SER-ACNC: SIGNIFICANT CHANGE UP
HCT VFR BLD CALC: 34.5 % — SIGNIFICANT CHANGE UP (ref 34.5–45)
HGB BLD-MCNC: 10.9 G/DL — LOW (ref 11.5–15.5)
IMM GRANULOCYTES NFR BLD AUTO: 0.4 % — SIGNIFICANT CHANGE UP (ref 0–0.9)
LYMPHOCYTES # BLD AUTO: 0.56 K/UL — LOW (ref 1–3.3)
LYMPHOCYTES # BLD AUTO: 11.5 % — LOW (ref 13–44)
MCHC RBC-ENTMCNC: 31.6 GM/DL — LOW (ref 32–36)
MCHC RBC-ENTMCNC: 31.8 PG — SIGNIFICANT CHANGE UP (ref 27–34)
MCV RBC AUTO: 100.6 FL — HIGH (ref 80–100)
MONOCYTES # BLD AUTO: 0.56 K/UL — SIGNIFICANT CHANGE UP (ref 0–0.9)
MONOCYTES NFR BLD AUTO: 11.5 % — SIGNIFICANT CHANGE UP (ref 2–14)
NEUTROPHILS # BLD AUTO: 3.22 K/UL — SIGNIFICANT CHANGE UP (ref 1.8–7.4)
NEUTROPHILS NFR BLD AUTO: 66 % — SIGNIFICANT CHANGE UP (ref 43–77)
NRBC # BLD: 0 /100 WBCS — SIGNIFICANT CHANGE UP (ref 0–0)
PHOSPHATE SERPL-MCNC: 3.7 MG/DL — SIGNIFICANT CHANGE UP (ref 2.5–4.5)
PLATELET # BLD AUTO: 117 K/UL — LOW (ref 150–400)
POTASSIUM SERPL-MCNC: 4.1 MMOL/L — SIGNIFICANT CHANGE UP (ref 3.5–5.3)
POTASSIUM SERPL-SCNC: 4.1 MMOL/L — SIGNIFICANT CHANGE UP (ref 3.5–5.3)
RBC # BLD: 3.43 M/UL — LOW (ref 3.8–5.2)
RBC # FLD: 14.3 % — SIGNIFICANT CHANGE UP (ref 10.3–14.5)
SODIUM SERPL-SCNC: 139 MMOL/L — SIGNIFICANT CHANGE UP (ref 135–145)
TROPONIN T, HIGH SENSITIVITY RESULT: 67 NG/L — HIGH (ref 0–51)
WBC # BLD: 4.88 K/UL — SIGNIFICANT CHANGE UP (ref 3.8–10.5)
WBC # FLD AUTO: 4.88 K/UL — SIGNIFICANT CHANGE UP (ref 3.8–10.5)

## 2023-06-02 PROCEDURE — 93454 CORONARY ARTERY ANGIO S&I: CPT | Mod: 26

## 2023-06-02 PROCEDURE — 99152 MOD SED SAME PHYS/QHP 5/>YRS: CPT

## 2023-06-02 RX ADMIN — AMLODIPINE BESYLATE 2.5 MILLIGRAM(S): 2.5 TABLET ORAL at 21:48

## 2023-06-02 RX ADMIN — Medication 81 MILLIGRAM(S): at 11:30

## 2023-06-02 RX ADMIN — Medication 12.5 MILLIGRAM(S): at 05:56

## 2023-06-02 RX ADMIN — Medication 12.5 MILLIGRAM(S): at 18:09

## 2023-06-02 RX ADMIN — HEPARIN SODIUM 5000 UNIT(S): 5000 INJECTION INTRAVENOUS; SUBCUTANEOUS at 05:56

## 2023-06-02 NOTE — CONSULT NOTE ADULT - ASSESSMENT
Impression:  Exertional dyspnea and episodes of chest and arm discomfort during dialysis yesterday with possible nitrate responsive chest pain and abnormal delta trop.                           S/P AVR for severe insufficiency.                               mitral valve prolapse with non-severe MR.                                 Pulmonary disease, AI and bronchiectasis.                        CKD with patient still urinating well.                          Prior weight loss probably related to change in appetite prior to dialysis started.     Plan:  In view of risk factors for CAD, known non-obstructive LAD disease 2017, episode of nitrate responsive chest pain during dialysis run yesterday, abnormal delta trop and prior negative ischemic workup earlier this year, coronary            angiography to be scheduled for definitive diagnosis.              Renal contacted without objection to cath today and dialysis tomorrow which would be routine schedule.

## 2023-06-02 NOTE — CONSULT NOTE ADULT - NS ATTEND AMEND GEN_ALL_CORE FT
Seen, examined with, formulated plan with and  agree with above as scribed by NP Alfredo [[place]]     lungs decrease bs   heart RRR   ext no edema   avf + bruit and thrill       esrd   for coronary angio today for cp     HD consent obtained witnessed and placed in jaime  hd in am   coronary angio today   htn norvasc 2.5 mg

## 2023-06-02 NOTE — PHYSICAL THERAPY INITIAL EVALUATION ADULT - ADDITIONAL COMMENTS
As per the pt, she lives alone in a Coop apt, +elevator, PTA, stated she was I with mobility/ADls and community ambulator and drives. Utilizes a cane for mobility also owns RW which she doesn't use. Son/grandkids live close by come in to check as needed.

## 2023-06-02 NOTE — PHYSICAL THERAPY INITIAL EVALUATION ADULT - PERTINENT HX OF CURRENT PROBLEM, REHAB EVAL
89 y/o independent F--with PMH: ESRD on HD (T/TH/S)  with etiology of ESRD unclear (reported history of MGUS), S/P aortic valve replacement at [place] in Aug 2017 following severe aortic insufficiency, bronchiectasis, history of ONEIL followed by pulmonary as above, essential HTN maintained on Lopressor and Norvasc, with the pt self referring to the ER following episodes of substernal chest pressure during a truncated session of HD this AM, with B/L arm weakness during the episode with tingling sensation.   Received full dose ASA and nitrate at the HD center.  Pt denies CP/pressure/arm symptoms at present.   Pt observed to be walking in the ER with assistance by staff with pt denies symptoms.  NO cough, but poor exercise tolerance, notes VILLAFUERTE with changing clothes. 88 y/o independent F--with PMH: ESRD on HD (T/TH/S)  with etiology of ESRD unclear (reported history of MGUS), S/P aortic valve replacement at [place] in Aug 2017 following severe aortic insufficiency, bronchiectasis, history of ONEIL followed by pulmonary as above, essential HTN maintained on Lopressor and Norvasc, with the pt self referring to the ER following episodes of substernal chest pressure during a truncated session of HD this AM, with B/L arm weakness during the episode with tingling sensation.   Received full dose ASA and nitrate at the HD center.  Pt denies CP/pressure/arm symptoms at present.   Pt observed to be walking in the ER with assistance by staff with pt denies symptoms.  NO cough, but poor exercise tolerance, notes VILLAFUERTE with changing clothes. CXR: Multiple pulmonary nodules are better appreciated on prior CT chest. CTH: no acute changes.

## 2023-06-02 NOTE — CONSULT NOTE ADULT - SUBJECTIVE AND OBJECTIVE BOX
Patient seen and evaluated @ bedside  Chief Complaint: Chest and arm pain    HPI:  89 y/o female with a history of ESRD on HD (T/TH/S)  with etiology of ESRD unclear (reported history of MGUS), S/P aortic valve replacement at [facility] in Aug 2017 following severe aortic insufficiency, bronchiectasis, history of ONEIL  essential HTN maintained on Lopressor and Norvasc, with the patient self referring to the ER following episodes of substernal chest pressure during a truncated session of HD yesterday      About 2-3 hours into her dialysis run with possibly more fluid taken off than usual she was temporarily disconnected to allow her to walk to the bathroom.  About 30 secondes into walking felt lightheaded, had retrosternal chest pain and BL arm pain and weakness with tingling sensation.   Blood pressure systolic when attended to by staff at that time was with systolic 170s.  There was associated dyspnea and nausea, no diaphoresis.  She also noted some tremor in arms which has persisted to some extent.  Received full dose ASA and nitrate at the HD center. Chest discomfort gradually subsided after NTG and resolved 5 minutes later.  Arm discomfort took a few hours to disappear and did so in the ER.      Patient has exertional dyspnea after 1 block.  W/U earlier this years revealed non-ischemic nuclear stress.  Echo with LVH with sparkling echogenicity.  Slightly increased trans prosthetic aortic valve velocity compared to previous but not severe stenosis.   Amyloid heart scan negative.      In ER initial troponin elevated however delta trop was abnormal.  CK normal  No further chest or arm discomfort.    In 2017 angiography revealed 20% proximal LAD and severe AI.      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 650 milliGRAM(s) Oral every 6 hours PRN  amLODIPine   Tablet 2.5 milliGRAM(s) Oral at bedtime  aspirin enteric coated 81 milliGRAM(s) Oral daily  heparin   Injectable 5000 Unit(s) SubCutaneous every 12 hours  metoprolol tartrate 12.5 milliGRAM(s) Oral two times a day    Allergies:  epinephrine (Other)  No Known Allergies    FAMILY HISTORY:  FH: CAD (coronary artery disease) (Father) MI age 65    Mother HTN 50s, colon cancer 70    Mat G HTN      Social History:  Smoking: None    REVIEW OF SYSTEMS:  CONSTITUTIONAL: + weakness - fevers or chills  CARDIOVASCULAR: See HPI.  Denies orthopnea PND, current edema.  No prior exertional CP but + chronic exertional dyspnea  All other review of systems is negative unless indicated above    Physical Exam:  T(C): 36.8 (06-02-23 @ 05:43), Max: 37.1 (06-01-23 @ 22:00)  HR: 69 (06-02-23 @ 05:43) (69 - 89)  BP: 131/71 (06-02-23 @ 05:43) (126/64 - 149/80)  RR: 18 (06-02-23 @ 05:43) (16 - 18)  SpO2: 95% (06-02-23 @ 05:43) (95% - 100%)  Wt(kg): --    Daily     Daily     Appearance:  Normal, NAD  Eyes:  EOMI  HEENT: Normal oral mucosa NC/AT  Neck:  No JVD  Respiratory: Clear to auscultation bilaterally  Cardiovascular: S1 obscurred by 1/6 SACHI base and LSB.  No rubs or gallops  Abdomen:   BS normal, Soft,  Non-tender without organomegaly or masses  Extremities: Without edema, pulses are full      Labs:                        10.9   4.88  )-----------( 117      ( 02 Jun 2023 06:52 )             34.5     06-02    139  |  105  |  32<H>  ----------------------------<  81  4.1   |  20<L>  |  3.14<H>    Ca    9.3      02 Jun 2023 06:52  Mg     2.2     06-01    TPro  7.2  /  Alb  4.1  /  TBili  0.6  /  DBili  x   /  AST  18  /  ALT  11  /  AlkPhos  90  06-01    PT/INR - ( 01 Jun 2023 14:34 )   PT: 11.1 sec;   INR: 0.96 ratio         PTT - ( 01 Jun 2023 14:34 )  PTT:29.1 sec  CARDIAC MARKERS ( 02 Jun 2023 01:21 )  x     / x     / x     / x     / 1.6 ng/mL  CARDIAC MARKERS ( 01 Jun 2023 14:34 )  x     / x     / x     / x     / 2.1 ng/mL      Trop 53/76/67        ECG:  NSR.. Left aqxis.  LAE.  Superior placement of precordial leads.  No acute changes

## 2023-06-02 NOTE — PHYSICAL THERAPY INITIAL EVALUATION ADULT - NSPTDISCHREC_GEN_A_CORE
home w/ family. Needs transport, No skilled PT needs Pt owns RW and cane at home./No skilled PT needs

## 2023-06-02 NOTE — CONSULT NOTE ADULT - SUBJECTIVE AND OBJECTIVE BOX
[facility]  [phone]  NEPHROLOGY      INITIAL CONSULT NOTE  --------------------------------------------------------------------------------  HPI:    89 year old Female, with PMHx ESRD on HD (T/TH/S), last HD yesterday, completed 2 hours of treatment, with etiology of ESRD unclear (reported history of MGUS), S/P aortic valve replacement at [facility] in Aug 2017 following severe aortic insufficiency, bronchiectasis, history of ONEIL followed by pulmonary as above, essential HTN maintained on Lopressor and Norvasc, with the patient self referring to the ER following episodes of substernal chest pressure during HD with B/L arm weakness during the episode with tingling sensation.   Received full dose ASA and nitrate at the HD center. Renal consult called for HD management.    PAST HISTORY  --------------------------------------------------------------------------------  PAST MEDICAL & SURGICAL HISTORY:  Bronchiolectasis      Migraine      Mitral valve prolapse      Palpitations      Nonrheumatic aortic (valve) stenosis      Scoliosis unspecified, unspecified site      ESRD on hemodialysis      S/P rotator cuff repair  left      Cataract extraction status      H/O colonoscopy        FAMILY HISTORY:  FH: CAD (coronary artery disease) (Father)    FH: MI (myocardial infarction) (Father)      PAST SOCIAL HISTORY:  no tobacco use    ALLERGIES & MEDICATIONS  --------------------------------------------------------------------------------  Allergies    No Known Allergies    Intolerances    epinephrine (Other)    Standing Inpatient Medications  amLODIPine   Tablet 2.5 milliGRAM(s) Oral at bedtime  aspirin enteric coated 81 milliGRAM(s) Oral daily  heparin   Injectable 5000 Unit(s) SubCutaneous every 12 hours  metoprolol tartrate 12.5 milliGRAM(s) Oral two times a day    PRN Inpatient Medications  acetaminophen     Tablet .. 650 milliGRAM(s) Oral every 6 hours PRN      REVIEW OF SYSTEMS  --------------------------------------------------------------------------------  Gen: No fevers/chills   Head/Eyes/Ears/Mouth: No headache; Normal hearing;    Respiratory: No dyspnea, cough, wheezing,  CV: No current chest pain, orthopnea  GI: No abdominal pain, diarrhea, nausea, vomiting,   : No dysuria  MSK: No joint pain/swelling; no back pain  Neuro: B/L hand tremors, No dizziness/lightheadedness, weakness  also with no edema     VITALS/PHYSICAL EXAM  --------------------------------------------------------------------------------  T(C): 36.6 (06-02-23 @ 12:01), Max: 37.1 (06-01-23 @ 22:00)  HR: 78 (06-02-23 @ 12:01) (69 - 89)  BP: 169/74 (06-02-23 @ 12:01) (126/64 - 169/74)  RR: 16 (06-02-23 @ 12:01) (16 - 18)  SpO2: 98% (06-02-23 @ 12:01) (95% - 100%)  Wt(kg): --  Height (cm): 157.5 (06-02-23 @ 12:13)  Weight (kg): 51.3 (06-02-23 @ 12:13)  BMI (kg/m2): 20.7 (06-02-23 @ 12:13)  BSA (m2): 1.5 (06-02-23 @ 12:13)      06-02-23 @ 07:01  -  06-02-23 @ 12:35  --------------------------------------------------------  IN: 190 mL / OUT: 0 mL / NET: 190 mL      Physical Exam:  	Gen: Non toxic comfortable appearing   	Pulm: decrease bs  no rales or ronchi or wheezing  	CV: No JVD. RRR, S1S2; no rub  	Back: No CVA tenderness; no sacral edema  	Abd: +BS, soft, nontender/nondistended  	: No suprapubic tenderness  	UE: Warm, no cyanosis  no clubbing,  no edema; no asterixis, +hand tremors  	LE: Warm, no cyanosis  no clubbing, no edema  	Neuro: alert and oriented. speech coherent   	Skin: Warm, no decrease skin turgor   	Vascular access: R IJ HD cath, L AVF, not mature    LABS/STUDIES  --------------------------------------------------------------------------------              10.9   4.88  >-----------<  117      [06-02-23 @ 06:52]              34.5     139  |  105  |  32  ----------------------------<  81      [06-02-23 @ 06:52]  4.1   |  20  |  3.14        Ca     9.3     [06-02-23 @ 06:52]      Mg     2.2     [06-01-23 @ 14:34]    TPro  7.2  /  Alb  4.1  /  TBili  0.6  /  DBili  x   /  AST  18  /  ALT  11  /  AlkPhos  90  [06-01-23 @ 14:34]    PT/INR: PT 11.1 , INR 0.96       [06-01-23 @ 14:34]  PTT: 29.1       [06-01-23 @ 14:34]      Creatinine Trend:  SCr 3.14 [06-02 @ 06:52]  SCr 2.02 [06-01 @ 14:34]    Urinalysis - [06-01-23 @ 16:25]      Color Colorless / Appearance Clear / SG 1.003 / pH 8.0      Gluc Negative / Ketone Negative  / Bili Negative / Urobili Negative       Blood Negative / Protein 30 mg/dL / Leuk Est Negative / Nitrite Negative      RBC 0 / WBC 1 / Hyaline 0 / Gran  / Sq Epi  / Non Sq Epi  / Bacteria Negative

## 2023-06-02 NOTE — CHART NOTE - NSCHARTNOTEFT_GEN_A_CORE
RADIAL ASSESSMENT NOTE    Right radial artery site; slight ecchymosis noted; nontender to touch. Radial pulses palpable      Karen Mckeon [place] [id number]   Department  of Medicine

## 2023-06-02 NOTE — CONSULT NOTE ADULT - ASSESSMENT
89 year old Female, with PMHx ESRD on HD (T/TH/S), last HD yesterday, completed 2 hours of treatment, with etiology of ESRD unclear (reported history of MGUS), S/P aortic valve replacement at [facility] in Aug 2017 following severe aortic insufficiency, bronchiectasis, history of ONEIL followed by pulmonary as above, essential HTN maintained on Lopressor and Norvasc, with the patient self referring to the ER following episodes of substernal chest pressure during HD with B/L arm weakness during the episode with tingling sensation.   Received full dose ASA and nitrate at the HD center      1- ESRD on HD  2- chest pain  3- Hx s/p AVR  4- HTN  5- anemia      HD consent, obtained, witnessed, and placed in chart  plan for HD tomorrow.  amlodipine 2.5 mg daily  metoprolol 12.5 mg BID  trend bp  check echo  cards planning ischemic workup, may proceed from renal standpoint.   noted with worsening tremors that patient states started with episodes of chest pain.   check phos, check pth  anemia, trend hgb

## 2023-06-03 LAB
ANION GAP SERPL CALC-SCNC: 19 MMOL/L — HIGH (ref 5–17)
BUN SERPL-MCNC: 48 MG/DL — HIGH (ref 7–23)
CALCIUM SERPL-MCNC: 9.6 MG/DL — SIGNIFICANT CHANGE UP (ref 8.4–10.5)
CALCIUM SERPL-MCNC: 9.7 MG/DL — SIGNIFICANT CHANGE UP (ref 8.4–10.5)
CHLORIDE SERPL-SCNC: 106 MMOL/L — SIGNIFICANT CHANGE UP (ref 96–108)
CO2 SERPL-SCNC: 18 MMOL/L — LOW (ref 22–31)
CREAT SERPL-MCNC: 3.94 MG/DL — HIGH (ref 0.5–1.3)
EGFR: 10 ML/MIN/1.73M2 — LOW
GLUCOSE SERPL-MCNC: 97 MG/DL — SIGNIFICANT CHANGE UP (ref 70–99)
HBV SURFACE AB SER-ACNC: <3 MIU/ML — LOW
HCT VFR BLD CALC: 38.5 % — SIGNIFICANT CHANGE UP (ref 34.5–45)
HCV AB S/CO SERPL IA: 0.12 S/CO — SIGNIFICANT CHANGE UP (ref 0–0.99)
HCV AB SERPL-IMP: SIGNIFICANT CHANGE UP
HGB BLD-MCNC: 12.1 G/DL — SIGNIFICANT CHANGE UP (ref 11.5–15.5)
MAGNESIUM SERPL-MCNC: 2.5 MG/DL — SIGNIFICANT CHANGE UP (ref 1.6–2.6)
MCHC RBC-ENTMCNC: 31.4 GM/DL — LOW (ref 32–36)
MCHC RBC-ENTMCNC: 31.8 PG — SIGNIFICANT CHANGE UP (ref 27–34)
MCV RBC AUTO: 101.3 FL — HIGH (ref 80–100)
NRBC # BLD: 0 /100 WBCS — SIGNIFICANT CHANGE UP (ref 0–0)
PHOSPHATE SERPL-MCNC: 4.7 MG/DL — HIGH (ref 2.5–4.5)
PLATELET # BLD AUTO: 131 K/UL — LOW (ref 150–400)
POTASSIUM SERPL-MCNC: 4.4 MMOL/L — SIGNIFICANT CHANGE UP (ref 3.5–5.3)
POTASSIUM SERPL-SCNC: 4.4 MMOL/L — SIGNIFICANT CHANGE UP (ref 3.5–5.3)
PTH-INTACT FLD-MCNC: 276 PG/ML — HIGH (ref 15–65)
RBC # BLD: 3.8 M/UL — SIGNIFICANT CHANGE UP (ref 3.8–5.2)
RBC # FLD: 14.3 % — SIGNIFICANT CHANGE UP (ref 10.3–14.5)
SODIUM SERPL-SCNC: 143 MMOL/L — SIGNIFICANT CHANGE UP (ref 135–145)
WBC # BLD: 5.63 K/UL — SIGNIFICANT CHANGE UP (ref 3.8–10.5)
WBC # FLD AUTO: 5.63 K/UL — SIGNIFICANT CHANGE UP (ref 3.8–10.5)

## 2023-06-03 PROCEDURE — 99232 SBSQ HOSP IP/OBS MODERATE 35: CPT

## 2023-06-03 PROCEDURE — 99222 1ST HOSP IP/OBS MODERATE 55: CPT

## 2023-06-03 RX ADMIN — Medication 81 MILLIGRAM(S): at 14:14

## 2023-06-03 RX ADMIN — Medication 12.5 MILLIGRAM(S): at 14:14

## 2023-06-03 RX ADMIN — AMLODIPINE BESYLATE 2.5 MILLIGRAM(S): 2.5 TABLET ORAL at 21:41

## 2023-06-03 NOTE — DIETITIAN INITIAL EVALUATION ADULT - ORAL INTAKE PTA/DIET HISTORY
Pt reports overall good PO intake and appetite PTA, follows renal diet at home. NKFA. Pt denies chewing/swallowing difficulty, nausea, vomiting, diarrhea, constipation.

## 2023-06-03 NOTE — DIETITIAN INITIAL EVALUATION ADULT - OTHER INFO
Weight: pt reports UBW used to be ~124lbs, endorses ~ 10lbs weight loss after starting dialysis. Weight noted as 123.4 on 8/22/17 per previous RD note. Pt states weight now as been stable ~ 113lbs but has been unable to regain weight originally lost. Post HD weight today is 128lbs (6/3)--? accuracy, will continue to monitor.

## 2023-06-03 NOTE — DIETITIAN INITIAL EVALUATION ADULT - ENERGY INTAKE
Pt reports appetite and PO intake remain intact in-house, consumed most of breakfast this morning prior to dialysis session, eating lunch during RD visit. Provided patient with menu and reviewed menu ordering procedures. Discussed with patient importance of PO intake and prioritizing sources of protein to maintain lean body mass in setting of increased protein needs. Pt reports understanding with no nutrition related questions at this time. Pt made aware RD remains available as needed.  Adequate (%)

## 2023-06-03 NOTE — DIETITIAN INITIAL EVALUATION ADULT - PERTINENT LABORATORY DATA
06-03    143  |  106  |  48<H>  ----------------------------<  97  4.4   |  18<L>  |  3.94<H>    Ca    9.6      03 Jun 2023 07:00  Phos  4.7     06-03  Mg     2.5     06-03    TPro  7.2  /  Alb  4.1  /  TBili  0.6  /  DBili  x   /  AST  18  /  ALT  11  /  AlkPhos  90  06-01

## 2023-06-03 NOTE — DIETITIAN INITIAL EVALUATION ADULT - FACTORS AFF FOOD INTAKE
Pt reporting new onset of hand trembling during admission (reports team is aware), requring assistance with cutting up food. Able to feed self fine/difficulty feeding self

## 2023-06-03 NOTE — CONSULT NOTE ADULT - SUBJECTIVE AND OBJECTIVE BOX
Neurology - Consult Note    -  Spectra: [phone] ([facility]), [phone] ([facility])  -    HPI: Patient JOHN LUND is a 89y (20-Apr-1934) woman with Hx aortic valve replacement at [facility] in Aug 2017 following severe aortic insufficiency, bronchiectasis, history of ONEIL, essential HTN, maintained on Lopressor and Norvasc, ESRD on HD T/Th/S, with last HD 6/1, completed 2 hours of treatment,  with the patient self referring to the ER following episodes of substernal chest pressure during HD with B/L arm weakness during the episode with tingling sensation. Received full dose ASA and nitrate at the HD center. Per nephro, patient continued on amlodipine 2.5mg daily, metoprolol 12.5mg BID. Cardiology saw patient, cath with nonobstructive coronary disease.     WBC 5.63. HGB 12.1 .3. .   . Ca 9.7.   BUN 48. Cr 3.94. EGFR 10.   Mg 2.5. Ph 4.7.   Trop T 67.     Neuro consulted for tremors during HD session.    Review of Systems:  INCOMPLETE   CONSTITUTIONAL: No fevers or chills  EYES AND ENT: No visual changes or no throat pain   NECK: No pain or stiffness  RESPIRATORY: No hemoptysis or shortness of breath  CARDIOVASCULAR: No chest pain or palpitations  GASTROINTESTINAL: No melena or hematochezia  GENITOURINARY: No dysuria or hematuria  NEUROLOGICAL: +As stated in HPI above  SKIN: No itching, burning, rashes, or lesions   All other review of systems is negative unless indicated above.    Allergies:  epinephrine (Other)  No Known Allergies      PMHx/PSHx/Family Hx: As above, otherwise see below   Bronchiolectasis    Migraine    Mitral valve prolapse    Palpitations    Elevated serum creatinine    Nonrheumatic aortic (valve) stenosis    Scoliosis unspecified, unspecified site    ESRD on hemodialysis        Social Hx:  No current use of tobacco, alcohol, or illicit drugs  Lives with ***    Medications:  MEDICATIONS  (STANDING):  amLODIPine   Tablet 2.5 milliGRAM(s) Oral at bedtime  aspirin enteric coated 81 milliGRAM(s) Oral daily  heparin   Injectable 5000 Unit(s) SubCutaneous every 12 hours  metoprolol tartrate 12.5 milliGRAM(s) Oral two times a day    MEDICATIONS  (PRN):  acetaminophen     Tablet .. 650 milliGRAM(s) Oral every 6 hours PRN Temp greater or equal to 38C (100.4F), Mild Pain (1 - 3)      Vitals:  T(C): 36.7 (06-03-23 @ 14:04), Max: 36.8 (06-02-23 @ 14:54)  HR: 95 (06-03-23 @ 14:04) (70 - 95)  BP: 115/68 (06-03-23 @ 14:04) (115/68 - 171/78)  RR: 18 (06-03-23 @ 14:04) (17 - 18)  SpO2: 98% (06-03-23 @ 14:04) (92% - 98%)    Physical Examination: INCOMPLETE  General - NAD  Cardiovascular - Peripheral pulses palpable, no edema  Eyes - Fundoscopy with flat, sharp optic discs and no hemorrhage or exudates; Fundoscopy not well visualized; Fundoscopy not performed due to safety precautions in the setting of the COVID-19 pandemic    Neurologic Exam:  Mental status - Awake, Alert, Oriented to person, place, and time. Speech fluent, repetition and naming intact. Follows simple and complex commands. Attention/concentration, recent and remote memory (including registration and recall), and fund of knowledge intact    Cranial nerves - PERRLA, VFF, EOMI, face sensation (V1-V3) intact b/l, facial strength intact without asymmetry b/l, hearing intact b/l, palate with symmetric elevation, trapezius OR sternocleidomastoid 5/5 strength b/l, tongue midline on protrusion with full lateral movement    Motor - Normal bulk and tone throughout. No pronator drift.  Strength testing            Deltoid      Biceps      Triceps     Wrist Extension    Wrist Flexion     Interossei         R            5                 5               5                     5                              5                        5                 5  L             5                 5               5                     5                              5                        5                 5              Hip Flexion    Hip Extension    Knee Flexion    Knee Extension    Dorsiflexion    Plantar Flexion  R              5                           5                       5                           5                            5                          5  L              5                           5                        5                           5                            5                          5    Sensation - Light touch/temperature OR pain/vibration intact throughout    DTR's -             Biceps      Triceps     Brachioradialis      Patellar    Ankle    Toes/plantar response  R             2+             2+                  2+                       2+            2+                 Down  L              2+             2+                 2+                        2+           2+                 Down    Coordination - Finger to Nose intact b/l. No tremors appreciated    Gait and station - Normal casual gait. Romberg (-)    Labs:                        12.1   5.63  )-----------( 131      ( 03 Jun 2023 07:02 )             38.5     06-03    143  |  106  |  48<H>  ----------------------------<  97  4.4   |  18<L>  |  3.94<H>    Ca    9.6      03 Jun 2023 07:00  Phos  4.7     06-03  Mg     2.5     06-03    TPro  7.2  /  Alb  4.1  /  TBili  0.6  /  DBili  x   /  AST  18  /  ALT  11  /  AlkPhos  90  06-01    CAPILLARY BLOOD GLUCOSE        LIVER FUNCTIONS - ( 01 Jun 2023 14:34 )  Alb: 4.1 g/dL / Pro: 7.2 g/dL / ALK PHOS: 90 U/L / ALT: 11 U/L / AST: 18 U/L / GGT: x             PT/INR - ( 01 Jun 2023 14:34 )   PT: 11.1 sec;   INR: 0.96 ratio         PTT - ( 01 Jun 2023 14:34 )  PTT:29.1 sec  CSF:        Radiology:  CT Head No Cont:  (01 Jun 2023 19:25)  ACC: [id number] EXAM: CT BRAIN ORDERED BY: KELSEY PA  PROCEDURE DATE: 06/01/2023  INTERPRETATION: Exam Date: 6/1/2023 7:25 PM  CT head without IV contrast  CLINICAL INFORMATION: headache headache  TECHNIQUE: Contiguous axial sections were obtained through the head. Coronal and sagittal reformats were obtained.  COMPARISON: None  FINDINGS:  The ventricles and sulci are prominent, compatible with age-related generalized cerebral volume loss. There is prominence of the extra-axial space overlying the by frontal lobes, likely reflecting small chronic subdural hygromas. There is no CT evidence for acute cerebral cortical infarct. There is no evidence of hemorrhage. There is periventricular and subcortical white matter hypoattenuation, most compatible with chronic microvascular ischemic changes. No mass effect is found in the brain. There is no midline shift or herniation pattern.  The visualized portions of the paranasal sinuses and mastoid air cells are clear.    IMPRESSION:  No evidence of acute intracranial abnormality. No evidence of hemorrhage.  Chronic changes as above.     Neurology - Consult Note    -  Spectra: [phone] ([facility]), [phone] ([facility])  -    HPI: Patient JOHN LUND is a 89y (20-Apr-1934) woman with Hx aortic valve replacement at [facility] in Aug 2017 following severe aortic insufficiency, bronchiectasis, history of ONEIL, essential HTN, maintained on Lopressor and Norvasc, ESRD on HD T/Th/S, with last HD PTA 6/1, where she completed 2 hours of treatment, with the patient self referring to the ER following episodes of substernal chest pressure during HD with B/L arm weakness during the episode with tingling sensation. Received full dose ASA and nitrate at the HD center. Per nephro, patient continued on amlodipine 2.5mg daily, metoprolol 12.5mg BID. Cardiology saw patient, cath with nonobstructive coronary disease.     Patient states she was [place] her [place] on Wednesday. On Thursday after her HD, she was on the way to the bathroom, when she felt a "sudden bolt of lightning" and had pain in b/l arms described as a heavy dullness over entire arm/forearm/hand area, not radiating. The dull pain lasted for half the day. She also complained of hand flailing b/l     WBC 5.63. HGB 12.1 .3. .   . Ca 9.7.   BUN 48. Cr 3.94. EGFR 10.   Mg 2.5. Ph 4.7.   Trop T 67.     Neuro consulted for tremors during HD session.    Review of Systems:    CONSTITUTIONAL: No fevers or chills  EYES AND ENT: No visual changes or no throat pain   NECK: No pain or stiffness  RESPIRATORY: No hemoptysis or shortness of breath  CARDIOVASCULAR: No chest pain or palpitations  GASTROINTESTINAL: No melena or hematochezia  GENITOURINARY: No dysuria or hematuria  NEUROLOGICAL: +As stated in HPI above  SKIN: No itching, burning, rashes, or lesions   All other review of systems is negative unless indicated above.    Allergies:  epinephrine (Other)  No Known Allergies      PMHx/PSHx/Family Hx: As above, otherwise see below   Bronchiolectasis    Migraine    Mitral valve prolapse    Palpitations    Elevated serum creatinine    Nonrheumatic aortic (valve) stenosis    Scoliosis unspecified, unspecified site    ESRD on hemodialysis        Social Hx:  No current use of tobacco, alcohol, or illicit drugs  Lives with ***    Medications:  MEDICATIONS  (STANDING):  amLODIPine   Tablet 2.5 milliGRAM(s) Oral at bedtime  aspirin enteric coated 81 milliGRAM(s) Oral daily  heparin   Injectable 5000 Unit(s) SubCutaneous every 12 hours  metoprolol tartrate 12.5 milliGRAM(s) Oral two times a day    MEDICATIONS  (PRN):  acetaminophen     Tablet .. 650 milliGRAM(s) Oral every 6 hours PRN Temp greater or equal to 38C (100.4F), Mild Pain (1 - 3)      Vitals:  T(C): 36.7 (06-03-23 @ 14:04), Max: 36.8 (06-02-23 @ 14:54)  HR: 95 (06-03-23 @ 14:04) (70 - 95)  BP: 115/68 (06-03-23 @ 14:04) (115/68 - 171/78)  RR: 18 (06-03-23 @ 14:04) (17 - 18)  SpO2: 98% (06-03-23 @ 14:04) (92% - 98%)    Physical Examination: INCOMPLETE  General - NAD  Cardiovascular - Peripheral pulses palpable, no edema  Eyes - Fundoscopy with flat, sharp optic discs and no hemorrhage or exudates; Fundoscopy not well visualized; Fundoscopy not performed due to safety precautions in the setting of the COVID-19 pandemic    Neurologic Exam:  Mental status - Awake, Alert, Oriented to person, place, and time. Speech fluent, repetition and naming intact. Follows simple and complex commands. Attention/concentration, recent and remote memory (including registration and recall), and fund of knowledge intact    Cranial nerves - PERRLA, VFF, EOMI, face sensation (V1-V3) intact b/l, facial strength intact without asymmetry b/l, hearing intact b/l, palate with symmetric elevation, trapezius OR sternocleidomastoid 5/5 strength b/l, tongue midline on protrusion with full lateral movement    Motor - Normal bulk and tone throughout. No pronator drift.  Strength testing            Deltoid      Biceps      Triceps     Wrist Extension    Wrist Flexion     Interossei         R            5                 5               5                     5                              5                        5                 5  L             5                 5               5                     5                              5                        5                 5              Hip Flexion    Hip Extension    Knee Flexion    Knee Extension    Dorsiflexion    Plantar Flexion  R              5                           5                       5                           5                            5                          5  L              5                           5                        5                           5                            5                          5    Sensation - Light touch/temperature OR pain/vibration intact throughout    DTR's -             Biceps      Triceps     Brachioradialis      Patellar    Ankle    Toes/plantar response  R             2+             2+                  2+                       2+            2+                 Down  L              2+             2+                 2+                        2+           2+                 Down    Coordination - Finger to Nose intact b/l. No tremors appreciated    Gait and station - Normal casual gait. Romberg (-)    Labs:                        12.1   5.63  )-----------( 131      ( 03 Jun 2023 07:02 )             38.5     06-03    143  |  106  |  48<H>  ----------------------------<  97  4.4   |  18<L>  |  3.94<H>    Ca    9.6      03 Jun 2023 07:00  Phos  4.7     06-03  Mg     2.5     06-03    TPro  7.2  /  Alb  4.1  /  TBili  0.6  /  DBili  x   /  AST  18  /  ALT  11  /  AlkPhos  90  06-01    CAPILLARY BLOOD GLUCOSE        LIVER FUNCTIONS - ( 01 Jun 2023 14:34 )  Alb: 4.1 g/dL / Pro: 7.2 g/dL / ALK PHOS: 90 U/L / ALT: 11 U/L / AST: 18 U/L / GGT: x             PT/INR - ( 01 Jun 2023 14:34 )   PT: 11.1 sec;   INR: 0.96 ratio         PTT - ( 01 Jun 2023 14:34 )  PTT:29.1 sec  CSF:        Radiology:  CT Head No Cont:  (01 Jun 2023 19:25)  ACC: [id number] EXAM: CT BRAIN ORDERED BY: KELSEY PA  PROCEDURE DATE: 06/01/2023  INTERPRETATION: Exam Date: 6/1/2023 7:25 PM  CT head without IV contrast  CLINICAL INFORMATION: headache headache  TECHNIQUE: Contiguous axial sections were obtained through the head. Coronal and sagittal reformats were obtained.  COMPARISON: None  FINDINGS:  The ventricles and sulci are prominent, compatible with age-related generalized cerebral volume loss. There is prominence of the extra-axial space overlying the by frontal lobes, likely reflecting small chronic subdural hygromas. There is no CT evidence for acute cerebral cortical infarct. There is no evidence of hemorrhage. There is periventricular and subcortical white matter hypoattenuation, most compatible with chronic microvascular ischemic changes. No mass effect is found in the brain. There is no midline shift or herniation pattern.  The visualized portions of the paranasal sinuses and mastoid air cells are clear.    IMPRESSION:  No evidence of acute intracranial abnormality. No evidence of hemorrhage.  Chronic changes as above.     Neurology - Consult Note    -  Spectra: [phone] ([facility]), [phone] ([facility])  -    HPI: Patient JOHN LUND is a 89y (20-Apr-1934) woman with Hx aortic valve replacement at [facility] in Aug 2017 following severe aortic insufficiency, bronchiectasis, history of ONEIL, essential HTN, maintained on Lopressor and Norvasc, ESRD on HD T/Th/S, with last HD PTA 6/1, where she completed 2 hours of treatment, with the patient self referring to the ER following episodes of substernal chest pressure during HD with B/L arm weakness during the episode with tingling sensation. Received full dose ASA and nitrate at the HD center. Per nephro, patient continued on amlodipine 2.5mg daily, metoprolol 12.5mg BID. Cardiology saw patient, cath with nonobstructive coronary disease.   Neuro consulted for tremors during HD session.   Patient states she was [place] her [place] on Wednesday. On Thursday after her HD, she was on the way to the bathroom, when she felt a "sudden bolt of lightning" and had pain in b/l arms described as a heavy dullness over entire arm/forearm/hand area, not radiating. The dull pain lasted for half the day. She also complained of hand flailing b/l at the time of the event, which improved over time. She complains of continued tremors in her b/l hands that are causing her difficulty with fine movements such as holding fork to eat.     WBC 5.63. HGB 12.1 .3. .   . Ca 9.7.   BUN 48. Cr 3.94. EGFR 10.   Mg 2.5. Ph 4.7.   Trop T 67.       Review of Systems:    CONSTITUTIONAL: No fevers or chills  EYES AND ENT: No visual changes or no throat pain   NECK: No pain or stiffness  RESPIRATORY: No hemoptysis or shortness of breath  CARDIOVASCULAR: No chest pain or palpitations  GASTROINTESTINAL: No melena or hematochezia  GENITOURINARY: No dysuria or hematuria  NEUROLOGICAL: +As stated in HPI above  SKIN: No itching, burning, rashes, or lesions   All other review of systems is negative unless indicated above.    Allergies:  epinephrine (Other)  No Known Allergies      PMHx/PSHx/Family Hx: As above, otherwise see below   Bronchiolectasis    Migraine    Mitral valve prolapse    Palpitations    Elevated serum creatinine    Nonrheumatic aortic (valve) stenosis    Scoliosis unspecified, unspecified site    ESRD on hemodialysis        Social Hx:  No current use of tobacco, alcohol, or illicit drugs      Medications:  MEDICATIONS  (STANDING):  amLODIPine   Tablet 2.5 milliGRAM(s) Oral at bedtime  aspirin enteric coated 81 milliGRAM(s) Oral daily  heparin   Injectable 5000 Unit(s) SubCutaneous every 12 hours  metoprolol tartrate 12.5 milliGRAM(s) Oral two times a day    MEDICATIONS  (PRN):  acetaminophen     Tablet .. 650 milliGRAM(s) Oral every 6 hours PRN Temp greater or equal to 38C (100.4F), Mild Pain (1 - 3)      Vitals:  T(C): 36.7 (06-03-23 @ 14:04), Max: 36.8 (06-02-23 @ 14:54)  HR: 95 (06-03-23 @ 14:04) (70 - 95)  BP: 115/68 (06-03-23 @ 14:04) (115/68 - 171/78)  RR: 18 (06-03-23 @ 14:04) (17 - 18)  SpO2: 98% (06-03-23 @ 14:04) (92% - 98%)    Physical Examination:   General - NAD  Cardiovascular - Peripheral pulses palpable, no edema  Eyes - Fundoscopy not performed due to safety precautions in the setting of the COVID-19 pandemic    Neurologic Exam:  Mental status - Awake, Alert, Oriented to person, place, and time. Speech fluent, repetition and naming intact. Follows simple and complex commands. Attention/concentration, recent and remote memory (including registration and recall), and fund of knowledge intact    Cranial nerves - PERRLA, VFF, EOMI, face sensation (V1-V3) intact b/l, facial strength intact without asymmetry b/l, hearing intact b/l, palate with symmetric elevation, trapezius OR sternocleidomastoid 5/5 strength b/l, tongue midline on protrusion with full lateral movement    Motor - Very thin extremities. Normal tone throughout. No obvious pronator drift.    Strength testing            Deltoid      Biceps      Triceps     Wrist Extension    Wrist Flexion     Interossei         R            5                 5               5                     5                              5                        5                 5  L             5                 5               5                     5                              5                        5                 5              Hip Flexion    Hip Extension    Knee Flexion    Knee Extension    Dorsiflexion    Plantar Flexion  R              5                           5                       5                           5                            5                          5  L              5                           5                        5                           5                            5                          5    Sensation - Light touch intact throughout    DTR's -             Biceps      Triceps     Brachioradialis      Patellar    Ankle    Toes/plantar response  R             2+             2+                  2+          0               2+                 Down  L              2+             2+                 2+          0               2+                 Down    Coordination - Finger to Nose intact b/l.  Tremors appreciated with action, very low amplitude, high frequency, but no dymetria    Gait and station - deferred for patient safety    Labs:                        12.1   5.63  )-----------( 131      ( 03 Jun 2023 07:02 )             38.5     06-03    143  |  106  |  48<H>  ----------------------------<  97  4.4   |  18<L>  |  3.94<H>    Ca    9.6      03 Jun 2023 07:00  Phos  4.7     06-03  Mg     2.5     06-03    TPro  7.2  /  Alb  4.1  /  TBili  0.6  /  DBili  x   /  AST  18  /  ALT  11  /  AlkPhos  90  06-01    CAPILLARY BLOOD GLUCOSE        LIVER FUNCTIONS - ( 01 Jun 2023 14:34 )  Alb: 4.1 g/dL / Pro: 7.2 g/dL / ALK PHOS: 90 U/L / ALT: 11 U/L / AST: 18 U/L / GGT: x             PT/INR - ( 01 Jun 2023 14:34 )   PT: 11.1 sec;   INR: 0.96 ratio         PTT - ( 01 Jun 2023 14:34 )  PTT:29.1 sec  CSF:        Radiology:  CT Head No Cont:  (01 Jun 2023 19:25)  ACC: [id number] EXAM: CT BRAIN ORDERED BY: KELSEY PA  PROCEDURE DATE: 06/01/2023  INTERPRETATION: Exam Date: 6/1/2023 7:25 PM  CT head without IV contrast  CLINICAL INFORMATION: headache headache  TECHNIQUE: Contiguous axial sections were obtained through the head. Coronal and sagittal reformats were obtained.  COMPARISON: None  FINDINGS:  The ventricles and sulci are prominent, compatible with age-related generalized cerebral volume loss. There is prominence of the extra-axial space overlying the by frontal lobes, likely reflecting small chronic subdural hygromas. There is no CT evidence for acute cerebral cortical infarct. There is no evidence of hemorrhage. There is periventricular and subcortical white matter hypoattenuation, most compatible with chronic microvascular ischemic changes. No mass effect is found in the brain. There is no midline shift or herniation pattern.  The visualized portions of the paranasal sinuses and mastoid air cells are clear.    IMPRESSION:  No evidence of acute intracranial abnormality. No evidence of hemorrhage.  Chronic changes as above.

## 2023-06-03 NOTE — PROGRESS NOTE ADULT - ASSESSMENT
89 year old Female, with PMHx ESRD on HD (T/TH/S), last HD yesterday, completed 2 hours of treatment, with etiology of ESRD unclear (reported history of MGUS), S/P aortic valve replacement at [facility] in Aug 2017 following severe aortic insufficiency, bronchiectasis, history of ONEIL followed by pulmonary as above, essential HTN maintained on Lopressor and Norvasc, with the patient self referring to the ER following episodes of substernal chest pressure during HD with B/L arm weakness during the episode with tingling sensation.   Received full dose ASA and nitrate at the HD center      1- ESRD on HD  2- chest pain  3- Hx s/p AVR  4- HTN        HD F160 3 hr bfr 400 dfr 600 0.5 liter fluid removal   see hd flwo sheet   amlodipine 2.5 mg daily  metoprolol 12.5 mg BID  tremors --> neuro consult

## 2023-06-03 NOTE — DIETITIAN INITIAL EVALUATION ADULT - NS FNS DIET ORDER
Diet, Renal Restrictions:   For patients receiving Renal Replacement - No Protein Restr, No Conc K, No Conc Phos, Low Sodium  DASH/TLC {Sodium & Cholesterol Restricted} (DASH)  No Beef  No Pork (06-01-23 @ 21:55) [Active]

## 2023-06-03 NOTE — PROGRESS NOTE ADULT - SUBJECTIVE AND OBJECTIVE BOX
HPI:  Patient seen and examined at bedside on 3 DSU.  Cath reviewed.  No events overnight.    Review Of Systems:           Respiratory: No shortness of breath, cough, or wheezing  Cardiovascular: No chest pain or palpitations  10 point review of systems is otherwise negative except as mentioned above        Medications:  acetaminophen     Tablet .. 650 milliGRAM(s) Oral every 6 hours PRN  amLODIPine   Tablet 2.5 milliGRAM(s) Oral at bedtime  aspirin enteric coated 81 milliGRAM(s) Oral daily  heparin   Injectable 5000 Unit(s) SubCutaneous every 12 hours  metoprolol tartrate 12.5 milliGRAM(s) Oral two times a day    PAST MEDICAL & SURGICAL HISTORY:  Bronchiolectasis      Migraine      Mitral valve prolapse      Palpitations      Nonrheumatic aortic (valve) stenosis      Scoliosis unspecified, unspecified site      ESRD on hemodialysis      S/P rotator cuff repair  left      Cataract extraction status      H/O colonoscopy        Vitals:  T(C): 36.7 (06-03-23 @ 04:36), Max: 36.8 (06-02-23 @ 14:54)  HR: 72 (06-03-23 @ 04:36) (70 - 86)  BP: 142/78 (06-03-23 @ 04:36) (133/76 - 185/87)  BP(mean): --  RR: 18 (06-03-23 @ 04:36) (16 - 18)  SpO2: 96% (06-03-23 @ 04:36) (92% - 98%)  Wt(kg): --  Daily Height in cm: 157.48 (02 Jun 2023 12:01)    Daily   I&O's Summary    02 Jun 2023 07:01  -  03 Jun 2023 07:00  --------------------------------------------------------  IN: 190 mL / OUT: 0 mL / NET: 190 mL        Physical Exam:  Appearance: No acute distress; well appearing  Eyes: PERRL, EOMI, pink conjunctiva  HENT: Normal oral mucosa  Cardiovascular: RRR, S1, S2, no murmurs, rubs, or gallops; no edema; no JVD  Respiratory: Clear to auscultation bilaterally  Gastrointestinal: soft, non-tender, non-distended with normal bowel sounds  Musculoskeletal: No clubbing; no joint deformity   Neurologic: Non-focal  Lymphatic: No lymphadenopathy  Psychiatry: AAOx3, mood & affect appropriate  Skin: No rashes, ecchymoses, or cyanosis                          12.1   5.63  )-----------( 131      ( 03 Jun 2023 07:02 )             38.5     06-03    143  |  106  |  48<H>  ----------------------------<  97  4.4   |  18<L>  |  3.94<H>    Ca    9.6      03 Jun 2023 07:00  Phos  4.7     06-03  Mg     2.5     06-03    TPro  7.2  /  Alb  4.1  /  TBili  0.6  /  DBili  x   /  AST  18  /  ALT  11  /  AlkPhos  90  06-01    PT/INR - ( 01 Jun 2023 14:34 )   PT: 11.1 sec;   INR: 0.96 ratio         PTT - ( 01 Jun 2023 14:34 )  PTT:29.1 sec  CARDIAC MARKERS ( 02 Jun 2023 01:21 )  x     / x     / x     / x     / 1.6 ng/mL  CARDIAC MARKERS ( 01 Jun 2023 14:34 )  x     / x     / x     / x     / 2.1 ng/mL       Weekend coverage for Tucker Perez MD    HPI:  Patient seen and examined at bedside on 3 DSU.  Cath reviewed.  No events overnight.    Review Of Systems:           Respiratory: No shortness of breath, cough, or wheezing  Cardiovascular: No chest pain or palpitations  10 point review of systems is otherwise negative except as mentioned above        Medications:  acetaminophen     Tablet .. 650 milliGRAM(s) Oral every 6 hours PRN  amLODIPine   Tablet 2.5 milliGRAM(s) Oral at bedtime  aspirin enteric coated 81 milliGRAM(s) Oral daily  heparin   Injectable 5000 Unit(s) SubCutaneous every 12 hours  metoprolol tartrate 12.5 milliGRAM(s) Oral two times a day    PAST MEDICAL & SURGICAL HISTORY:  Bronchiolectasis      Migraine      Mitral valve prolapse      Palpitations      Nonrheumatic aortic (valve) stenosis      Scoliosis unspecified, unspecified site      ESRD on hemodialysis      S/P rotator cuff repair  left      Cataract extraction status      H/O colonoscopy        Vitals:  T(C): 36.7 (06-03-23 @ 04:36), Max: 36.8 (06-02-23 @ 14:54)  HR: 72 (06-03-23 @ 04:36) (70 - 86)  BP: 142/78 (06-03-23 @ 04:36) (133/76 - 185/87)  BP(mean): --  RR: 18 (06-03-23 @ 04:36) (16 - 18)  SpO2: 96% (06-03-23 @ 04:36) (92% - 98%)  Wt(kg): --  Daily Height in cm: 157.48 (02 Jun 2023 12:01)    Daily   I&O's Summary    02 Jun 2023 07:01  -  03 Jun 2023 07:00  --------------------------------------------------------  IN: 190 mL / OUT: 0 mL / NET: 190 mL        Physical Exam:  Appearance: No acute distress; well appearing  Eyes: PERRL, EOMI, pink conjunctiva  HENT: Normal oral mucosa  Cardiovascular: RRR, S1, S2, I/VI systolic murmur at base  Respiratory: Clear to auscultation bilaterally  Gastrointestinal: soft, non-tender, non-distended with normal bowel sounds  Musculoskeletal: No clubbing; no joint deformity   Neurologic: Non-focal  Lymphatic: No lymphadenopathy  Psychiatry: AAOx3, mood & affect appropriate  Skin: No rashes, ecchymoses, or cyanosis                          12.1   5.63  )-----------( 131      ( 03 Jun 2023 07:02 )             38.5     06-03    143  |  106  |  48<H>  ----------------------------<  97  4.4   |  18<L>  |  3.94<H>    Ca    9.6      03 Jun 2023 07:00  Phos  4.7     06-03  Mg     2.5     06-03    TPro  7.2  /  Alb  4.1  /  TBili  0.6  /  DBili  x   /  AST  18  /  ALT  11  /  AlkPhos  90  06-01    PT/INR - ( 01 Jun 2023 14:34 )   PT: 11.1 sec;   INR: 0.96 ratio         PTT - ( 01 Jun 2023 14:34 )  PTT:29.1 sec  CARDIAC MARKERS ( 02 Jun 2023 01:21 )  x     / x     / x     / x     / 1.6 ng/mL  CARDIAC MARKERS ( 01 Jun 2023 14:34 )  x     / x     / x     / x     / 2.1 ng/mL

## 2023-06-03 NOTE — DIETITIAN INITIAL EVALUATION ADULT - ADD RECOMMEND
1) Recommend liberalize diet to Renal diet (remove DASH). 2) Recommend addition of Nepro shake 1x daily to optimize PO intake. 3) RD to remain available and follow-up as medically appropriate.

## 2023-06-03 NOTE — DIETITIAN INITIAL EVALUATION ADULT - REASON FOR ADMISSION
Chest pain    Chart reviewed, events noted. This is a "88 y/o independent F--with a history of ESRD on HD (T/TH/S)  with etiology of ESRD unclear (reported history of MGUS), S/P aortic valve replacement at [place] in Aug 2017 following severe aortic insufficiency, bronchiectasis, history of ONEIL followed by pulmonary as above, essential HTN maintained on Lopressor and Norvasc, with the patient self referring to the ER following episodes of substernal chest pressure during a truncated session of HD this AM"

## 2023-06-03 NOTE — CONSULT NOTE ADULT - REASON FOR ADMISSION
Episode of substernal chest pressure with B/L arm weakness during interrupted HD session this AM.

## 2023-06-03 NOTE — DIETITIAN INITIAL EVALUATION ADULT - PERTINENT MEDS FT
MEDICATIONS  (STANDING):  amLODIPine   Tablet 2.5 milliGRAM(s) Oral at bedtime  aspirin enteric coated 81 milliGRAM(s) Oral daily  heparin   Injectable 5000 Unit(s) SubCutaneous every 12 hours  metoprolol tartrate 12.5 milliGRAM(s) Oral two times a day    MEDICATIONS  (PRN):  acetaminophen     Tablet .. 650 milliGRAM(s) Oral every 6 hours PRN Temp greater or equal to 38C (100.4F), Mild Pain (1 - 3)

## 2023-06-03 NOTE — PROGRESS NOTE ADULT - SUBJECTIVE AND OBJECTIVE BOX
[facility]   [phone]  RENAL FOLLOW UP NOTE  --------------------------------------------------------------------------------  Chief Complaint:    24 hour events/subjective:    seen on hd  c/o tremors     PAST HISTORY  --------------------------------------------------------------------------------  No significant changes to PMH, PSH, FHx, SHx, unless otherwise noted    ALLERGIES & MEDICATIONS  --------------------------------------------------------------------------------  Allergies    No Known Allergies    Intolerances    epinephrine (Other)    Standing Inpatient Medications  amLODIPine   Tablet 2.5 milliGRAM(s) Oral at bedtime  aspirin enteric coated 81 milliGRAM(s) Oral daily  heparin   Injectable 5000 Unit(s) SubCutaneous every 12 hours  metoprolol tartrate 12.5 milliGRAM(s) Oral two times a day    PRN Inpatient Medications  acetaminophen     Tablet .. 650 milliGRAM(s) Oral every 6 hours PRN      REVIEW OF SYSTEMS  --------------------------------------------------------------------------------    Gen: denies  fevers/chills,  CVS: denies chest pain/palpitations  Resp: denies SOB/Cough  GI: Denies N/V/Abd pain  : Denies dysuria    VITALS/PHYSICAL EXAM  --------------------------------------------------------------------------------  T(C): 37.1 (06-03-23 @ 21:13), Max: 37.1 (06-03-23 @ 21:13)  HR: 88 (06-03-23 @ 21:13) (70 - 95)  BP: 135/74 (06-03-23 @ 21:13) (115/68 - 158/81)  RR: 18 (06-03-23 @ 21:13) (17 - 18)  SpO2: 96% (06-03-23 @ 21:13) (95% - 98%)  Wt(kg): --  Height (cm): 157.5 (06-02-23 @ 12:13)  Weight (kg): 51.3 (06-02-23 @ 12:13)  BMI (kg/m2): 20.7 (06-02-23 @ 12:13)  BSA (m2): 1.5 (06-02-23 @ 12:13)      06-02-23 @ 07:01  -  06-03-23 @ 07:00  --------------------------------------------------------  IN: 190 mL / OUT: 0 mL / NET: 190 mL    06-03-23 @ 07:01  -  06-03-23 @ 23:25  --------------------------------------------------------  IN: 0 mL / OUT: 500 mL / NET: -500 mL      Physical Exam:  	    	Gen: Non toxic comfortable appearing   	Pulm: decrease bs  no rales or ronchi or wheezing  	CV: No JVD. RRR, S1S2; no rub  	Back: No CVA tenderness; no sacral edema  	Abd: +BS, soft, nontender/nondistended  	: No suprapubic tenderness  	UE: Warm, no cyanosis  no clubbing,  no edema; no asterixis, +hand tremors  	LE: Warm, no cyanosis  no clubbing, no edema  	Neuro: alert and oriented. speech coherent   	Vascular access: R IJ HD cath, L AVF, not mature      LABS/STUDIES  --------------------------------------------------------------------------------              12.1   5.63  >-----------<  131      [06-03-23 @ 07:02]              38.5     143  |  106  |  48  ----------------------------<  97      [06-03-23 @ 07:00]  4.4   |  18  |  3.94        Ca     9.6     [06-03-23 @ 07:00]      Mg     2.5     [06-03-23 @ 07:00]      Phos  4.7     [06-03-23 @ 07:00]            Creatinine Trend:  SCr 3.94 [06-03 @ 07:00]  SCr 3.14 [06-02 @ 06:52]  SCr 2.02 [06-01 @ 14:34]              Urinalysis - [06-01-23 @ 16:25]      Color Colorless / Appearance Clear / SG 1.003 / pH 8.0      Gluc Negative / Ketone Negative  / Bili Negative / Urobili Negative       Blood Negative / Protein 30 mg/dL / Leuk Est Negative / Nitrite Negative      RBC 0 / WBC 1 / Hyaline 0 / Gran  / Sq Epi  / Non Sq Epi  / Bacteria Negative      PTH -- (Ca 9.7)      [06-03-23 @ 07:00]   276

## 2023-06-03 NOTE — CONSULT NOTE ADULT - ASSESSMENT
Patient JOHN LUND is a 89y (20-Apr-1934) woman with Hx aortic valve replacement at [place] in Aug 2017 following severe aortic insufficiency, bronchiectasis, history of ONEIL, essential HTN, maintained on Lopressor and Norvasc, ESRD on HD T/Th/S, with last HD PTA 6/1, where she completed 2 hours of treatment, with the patient self referring to the ER following episodes of substernal chest pressure during HD with B/L arm weakness during the episode with tingling sensation. Received full dose ASA and nitrate at the HD center. Per nephro, patient continued on amlodipine 2.5mg daily, metoprolol 12.5mg BID. Cardiology saw patient, cath with nonobstructive coronary disease.   Neuro consulted for tremors during HD session. Patient states she was at her [place]aeline on Wednesday. On Thursday after her HD, she was on the way to the bathroom, when she felt a "sudden bolt of lightning" and had pain in b/l arms described as a heavy dullness over entire arm/forearm/hand area, not radiating. The dull pain lasted for half the day. She also complained of hand flailing b/l at the time of the event, which improved over time. She complains of continued tremors in her b/l hands that are causing her difficulty with fine movements such as holding fork to eat.     No evidence of acute intracranial abnormality on CTH.    Impression: Action tremor, tremor likely worsened from baseline in setting of medical comorbidities, fluid shifts during dialysis, tremor now improving.    Recommend:   [] Continue to monitor for improvement  [] No medications needed for the tremor at this time    Patient seen on PM neurology rounds with attending.   Case and plan not final until attending attestation.      Patient JOHN LUND is a 89y (20-Apr-1934) woman with Hx aortic valve replacement at [place] in Aug 2017 following severe aortic insufficiency, bronchiectasis, history of ONEIL, essential HTN, maintained on Lopressor and Norvasc, ESRD on HD T/Th/S, with last HD PTA 6/1, where she completed 2 hours of treatment, with the patient self referring to the ER following episodes of substernal chest pressure during HD with B/L arm weakness during the episode with tingling sensation. Received full dose ASA and nitrate at the HD center. Per nephro, patient continued on amlodipine 2.5mg daily, metoprolol 12.5mg BID. Cardiology saw patient, cath with nonobstructive coronary disease.   Neuro consulted for tremors during HD session. Patient states she was at her basaeline on Wednesday. On Thursday after her HD, she was on the way to the bathroom, when she felt a "sudden bolt of lightning" and had pain in b/l arms described as a heavy dullness over entire arm/forearm/hand area, not radiating. The dull pain lasted for half the day. She also complained of hand flailing b/l at the time of the event, which improved over time. She complains of continued tremors in her b/l hands that are causing her difficulty with fine movements such as holding fork to eat.     No evidence of acute intracranial abnormality on CTH.    Impression: Action tremor, tremor likely worsened from baseline in setting of medical comorbidities, fluid shifts during dialysis, tremor now improving.    Recommend:   [] Continue to monitor for improvement  [] No medications needed for the tremor at this time  [] TSH, Free T4, Vit B12, B9    Patient seen on PM neurology rounds with attending.   Case and plan not final until attending attestation.

## 2023-06-03 NOTE — PROGRESS NOTE ADULT - ASSESSMENT
Impression:  Exertional dyspnea and episodes of chest and arm discomfort during dialysis yesterday with possible nitrate responsive chest pain and abnormal delta trop.                           S/P AVR for severe insufficiency.                               mitral valve prolapse with non-severe MR.                                 Pulmonary disease, AI and bronchiectasis.                        CKD with patient still urinating well.                          Prior weight loss probably related to change in appetite prior to dialysis started.                           Cath with nonobstructive coronary disease    Plan:  Follow-up TTE              HD per nephrology              Discharge after HD.

## 2023-06-03 NOTE — CONSULT NOTE ADULT - ATTENDING COMMENTS
Ms. Aragon is an 89y (20-Apr-1934) woman with PMHx Chronic Migraine and aortic valve replacement at [place] in Aug 2017 following severe aortic insufficiency, bronchiectasis, history of ONEIL, essential HTN (maintained on Lopressor and Norvasc) ESRD on HD T/Th/S, with last HD PTA 6/1, where she completed 2 hours of treatment.  She had gone to ED after episodes of substernal chest pressure during HD with B/L arm weakness, new onset tremor and tingling sensation. Received full dose ASA and nitrate at the HD center and continued on amlodipine 2.5mg daily, metoprolol 12.5mg BID. Cardiology saw patient, cath with nonobstructive coronary disease.   This last Thursday after her HD, she was on the way to the bathroom, when she felt a "sudden bolt of lightning" and had pain in b/l arms described as a heavy dullness over entire arm/forearm/hand area, not radiating down her arms and lasting a half day and initially associated with "hand flailing" or clumsiness She complains of continued tremors in her b/l hands that are causing her difficulty with fine movements such as holding fork to eat but does acknowledge that this seems to have gotten somewhat better over time.    On  exam Pt is awake, alert and oriented * 3  cn 2-12 grossly intact without nystagmus or facial droop.  Strength 5/5 in 4/4  No noted focal sensory change.    No evidence of acute intracranial abnormality on CTH.    Impression: Action tremor likely worsened from baseline in setting of medical comorbidities, fluid shifts during dialysis    Recommend:   [] Continue to monitor for improvement  [] No medications needed for the tremor at this time  [] TSH, Free T4, Vit B12, B9

## 2023-06-04 ENCOUNTER — TRANSCRIPTION ENCOUNTER (OUTPATIENT)
Age: 88
End: 2023-06-04

## 2023-06-04 VITALS — SYSTOLIC BLOOD PRESSURE: 136 MMHG | HEART RATE: 76 BPM | OXYGEN SATURATION: 96 % | DIASTOLIC BLOOD PRESSURE: 75 MMHG

## 2023-06-04 PROCEDURE — 80053 COMPREHEN METABOLIC PANEL: CPT

## 2023-06-04 PROCEDURE — 97161 PT EVAL LOW COMPLEX 20 MIN: CPT

## 2023-06-04 PROCEDURE — 82310 ASSAY OF CALCIUM: CPT

## 2023-06-04 PROCEDURE — 36415 COLL VENOUS BLD VENIPUNCTURE: CPT

## 2023-06-04 PROCEDURE — 71045 X-RAY EXAM CHEST 1 VIEW: CPT

## 2023-06-04 PROCEDURE — 81001 URINALYSIS AUTO W/SCOPE: CPT

## 2023-06-04 PROCEDURE — 85025 COMPLETE CBC W/AUTO DIFF WBC: CPT

## 2023-06-04 PROCEDURE — C1894: CPT

## 2023-06-04 PROCEDURE — 83970 ASSAY OF PARATHORMONE: CPT

## 2023-06-04 PROCEDURE — 85730 THROMBOPLASTIN TIME PARTIAL: CPT

## 2023-06-04 PROCEDURE — 84100 ASSAY OF PHOSPHORUS: CPT

## 2023-06-04 PROCEDURE — 86803 HEPATITIS C AB TEST: CPT

## 2023-06-04 PROCEDURE — 85610 PROTHROMBIN TIME: CPT

## 2023-06-04 PROCEDURE — 84484 ASSAY OF TROPONIN QUANT: CPT

## 2023-06-04 PROCEDURE — 70450 CT HEAD/BRAIN W/O DYE: CPT | Mod: MA

## 2023-06-04 PROCEDURE — 80048 BASIC METABOLIC PNL TOTAL CA: CPT

## 2023-06-04 PROCEDURE — 85027 COMPLETE CBC AUTOMATED: CPT

## 2023-06-04 PROCEDURE — C1769: CPT

## 2023-06-04 PROCEDURE — 99261: CPT

## 2023-06-04 PROCEDURE — 86706 HEP B SURFACE ANTIBODY: CPT

## 2023-06-04 PROCEDURE — C1887: CPT

## 2023-06-04 PROCEDURE — 83880 ASSAY OF NATRIURETIC PEPTIDE: CPT

## 2023-06-04 PROCEDURE — 93454 CORONARY ARTERY ANGIO S&I: CPT

## 2023-06-04 PROCEDURE — 83735 ASSAY OF MAGNESIUM: CPT

## 2023-06-04 PROCEDURE — 82553 CREATINE MB FRACTION: CPT

## 2023-06-04 PROCEDURE — 99233 SBSQ HOSP IP/OBS HIGH 50: CPT

## 2023-06-04 PROCEDURE — 87340 HEPATITIS B SURFACE AG IA: CPT

## 2023-06-04 PROCEDURE — 99285 EMERGENCY DEPT VISIT HI MDM: CPT | Mod: 25

## 2023-06-04 PROCEDURE — 83690 ASSAY OF LIPASE: CPT

## 2023-06-04 RX ORDER — ASPIRIN/CALCIUM CARB/MAGNESIUM 324 MG
1 TABLET ORAL
Qty: 30 | Refills: 0
Start: 2023-06-04 | End: 2023-07-03

## 2023-06-04 RX ADMIN — Medication 12.5 MILLIGRAM(S): at 05:41

## 2023-06-04 NOTE — DISCHARGE NOTE PROVIDER - NSDCCPCAREPLAN_GEN_ALL_CORE_FT
PRINCIPAL DISCHARGE DIAGNOSIS  Diagnosis: Chest pain  Assessment and Plan of Treatment: Resolved.  Cardiac cath on 6/2/23 with non obstructive CAD.  Follow up with your PMD within one week of discharge for further management.      SECONDARY DISCHARGE DIAGNOSES  Diagnosis: ESRD on hemodialysis  Assessment and Plan of Treatment: Continue with dialysis as scheduled.  Follow up with your nephrologist.    Diagnosis: Tremor  Assessment and Plan of Treatment: Seen by neurology inpatient.  Please follow up with your neurologist Dr. Alegria within 2 weeks of discharge.     PRINCIPAL DISCHARGE DIAGNOSIS  Diagnosis: Chest pain  Assessment and Plan of Treatment: Resolved.  Cardiac cath on 6/2/23 with non obstructive CAD.  Follow up with your PMD within one week of discharge for further management.      SECONDARY DISCHARGE DIAGNOSES  Diagnosis: ESRD on hemodialysis  Assessment and Plan of Treatment: Continue with dialysis as scheduled.  Follow up with your nephrologist.    Diagnosis: Tremor  Assessment and Plan of Treatment: Seen by neurology inpatient.  Please follow up with your neurologist Dr. Alegria within 2 weeks of discharge.    Diagnosis: Abnormal chest xray  Assessment and Plan of Treatment: Noted with multiple bilateral lung nodules on chest xray (noted on prior chest CT).  Patient refused chest CT inpatient.  Follow up with your PMD to discuss possible CT.

## 2023-06-04 NOTE — DISCHARGE NOTE PROVIDER - CARE PROVIDER_API CALL
Tucker Perez  Cardiovascular Disease  [address], [address]  [place] [address]  Phone: [phone]  Fax: [phone]  Follow Up Time: 1 week    Gildardo Alegria  Neurology  [address]  [place] [address]  Phone: [phone]  Fax: [phone]  Follow Up Time: 2 weeks    Trip Tompkins  Nephrology  [address] [place] [address], [address]  [place] [address]  Phone: [phone]  Fax: [phone]  Follow Up Time:

## 2023-06-04 NOTE — DISCHARGE NOTE PROVIDER - PROVIDER TOKENS
PROVIDER:[TOKEN:[[id number]],FOLLOWUP:[1 week]],PROVIDER:[TOKEN:[[id number]],FOLLOWUP:[2 weeks]],PROVIDER:[TOKEN:[[id number]]]

## 2023-06-04 NOTE — PROGRESS NOTE ADULT - ASSESSMENT
Patient JOHN LUND is a 89y (20-Apr-1934) woman with Hx aortic valve replacement at [place] in Aug 2017 following severe aortic insufficiency, bronchiectasis, history of ONEIL, essential HTN, maintained on Lopressor and Norvasc, ESRD on HD T/Th/S, with last HD PTA 6/1, where she completed 2 hours of treatment, with the patient self referring to the ER following episodes of substernal chest pressure during HD with B/L arm weakness during the episode with tingling sensation. Received full dose ASA and nitrate at the HD center. Per nephro, patient continued on amlodipine 2.5mg daily, metoprolol 12.5mg BID. Cardiology saw patient, cath with nonobstructive coronary disease.   Neuro consulted for tremors during HD session. Patient states she was at her basaeline on Wednesday. On Thursday after her HD, she was on the way to the bathroom, when she felt a "sudden bolt of lightning" and had pain in b/l arms described as a heavy dullness over entire arm/forearm/hand area, not radiating. The dull pain lasted for half the day. She also complained of hand flailing b/l at the time of the event, which improved over time. She complains of continued tremors in her b/l hands that are causing her difficulty with fine movements such as holding fork to eat.   No evidence of acute intracranial abnormality on CTH.    Impression: Improved Action tremor, tremor likely worsened from baseline in setting of medical comorbidities, fluid shifts during dialysis with components of toxic/metabolic etiology ( CHAI with CKD).    Recommend:   [] Continue to monitor for improvement  [] No medications needed for the tremor at this time  [] consider labs: TSH, Free T4, Vit B12, B9  [] rest of care per primary team  [] No further inpatient neurology recommendation, will sign off, please call consult service [phone] with any questions.  [] Patient can follow up with her neurologist, Dr. Alegria at [address] [place] [address] 1-2 weeks after discharge by calling [phone] to schedule this appointment    Plans discussed with neurology attending, Dr. Alegria

## 2023-06-04 NOTE — PROGRESS NOTE ADULT - SUBJECTIVE AND OBJECTIVE BOX
NEUROLOGY FOLLOW-UP CONSULT NOTE    RFC: tremors    Interval history: No acute neurologic events overnight. Patient reports significant improvement of tremors today.     Meds:  MEDICATIONS  (STANDING):  amLODIPine   Tablet 2.5 milliGRAM(s) Oral at bedtime  aspirin enteric coated 81 milliGRAM(s) Oral daily  heparin   Injectable 5000 Unit(s) SubCutaneous every 12 hours  metoprolol tartrate 12.5 milliGRAM(s) Oral two times a day    MEDICATIONS  (PRN):  acetaminophen     Tablet .. 650 milliGRAM(s) Oral every 6 hours PRN Temp greater or equal to 38C (100.4F), Mild Pain (1 - 3)      PMHx/PSHx/FHx/SHx:  Chest pain    Bronchiolectasis    Migraine    Mitral valve prolapse    Palpitations    Elevated serum creatinine    Nonrheumatic aortic (valve) stenosis    Scoliosis unspecified, unspecified site    ESRD on hemodialysis    Chest pain    Multiple pulmonary nodules    Essential hypertension    Abnormal weight loss    S/P rotator cuff repair    Cataract extraction status    H/O colonoscopy      Allergies:  epinephrine (Other)  No Known Allergies      ROS: All systems negative except as documented in Interval history    O:  T(C): 36.5 (06-04-23 @ 05:08), Max: 37.1 (06-03-23 @ 21:13)  HR: 69 (06-04-23 @ 05:08) (69 - 95)  BP: 129/70 (06-04-23 @ 05:08) (115/68 - 135/74)  RR: 18 (06-04-23 @ 05:08) (18 - 18)  SpO2: 96% (06-04-23 @ 05:08) (96% - 98%)    Focused neurologic exam:  MS - AAO x3, speech fluent, rep/naming intact, follows commands, attn/conc/recent and remote memory/fund of knowledge WNL  CN - PERRLA, EOMI, VFF, face sens/str/hearing WNL b/l, tongue/palate midline, trap 5/5 b/l  Motor - Normal bulk/tone, 5/5 all  Sens - LT/temp/vib intact all  DTR's - 2+ all except mute b/l patella and downgoing b/l plantar response  Coord - FtN intact b/l, postural and intention tremors b/l arms  Gait and station - Not assessed due to fall risk    Pertinent labs/studies:    LABS:  cret                        12.1   5.63  )-----------( 131      ( 03 Jun 2023 07:02 )             38.5     06-03    143  |  106  |  48<H>  ----------------------------<  97  4.4   |  18<L>  |  3.94<H>    Ca    9.6      03 Jun 2023 07:00  Phos  4.7     06-03  Mg     2.5     06-03       CT Head No Cont (06.01.23 @ 19:25)   FINDINGS:  The ventricles and sulci are prominent, compatible with age-related   generalized cerebral volume loss. There is prominence of the extra-axial   space overlying the by frontal lobes, likely reflecting small chronic   subdural hygromas. There is no CT evidence for acute cerebral cortical   infarct. There is no evidence of hemorrhage. There is periventricular and   subcortical white matter hypoattenuation,  most compatible with chronic   microvascular ischemic changes.   No mass effect is found in the brain.    There is no midline shift or herniation pattern.      The visualized portions of the paranasal sinuses andmastoid air cells   are clear.    IMPRESSION:    No evidence of acute intracranial abnormality.  No evidence of hemorrhage.    Chronic changes as above.      < end of copied text >

## 2023-06-04 NOTE — DISCHARGE NOTE PROVIDER - NSDCMRMEDTOKEN_GEN_ALL_CORE_FT
aspirin 81 mg oral delayed release tablet: 1 tab(s) orally once a day  metoprolol tartrate 25 mg oral tablet: 0.5 orally 2 times a day  Norvasc 2.5 mg oral tablet: 1 orally once a day (at bedtime)

## 2023-06-04 NOTE — PROGRESS NOTE ADULT - REASON FOR ADMISSION
Episode of substernal chest pressure with B/L arm weakness during interrupted HD session this AM.

## 2023-06-04 NOTE — PROGRESS NOTE ADULT - ASSESSMENT
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   Received full dose ASA and nitrate at the HD center      1- ESRD on HD  2- chest pain  3- Hx s/p AVR  4- HTN        no hd today    amlodipine 2.5 mg daily  metoprolol 12.5 mg BID  tremors --> neuro consult  noted and sx improving

## 2023-06-04 NOTE — PROGRESS NOTE ADULT - SUBJECTIVE AND OBJECTIVE BOX
[facility]   [phone]  RENAL FOLLOW UP NOTE  --------------------------------------------------------------------------------  Chief Complaint:    24 hour events/subjective:    seen earlier   states tremor have been decreasing today and is able to hold objects     PAST HISTORY  --------------------------------------------------------------------------------  No significant changes to PMH, PSH, FHx, SHx, unless otherwise noted    ALLERGIES & MEDICATIONS  --------------------------------------------------------------------------------  Allergies    No Known Allergies    Intolerances    epinephrine (Other)    Standing Inpatient Medications  amLODIPine   Tablet 2.5 milliGRAM(s) Oral at bedtime  aspirin enteric coated 81 milliGRAM(s) Oral daily  heparin   Injectable 5000 Unit(s) SubCutaneous every 12 hours  metoprolol tartrate 12.5 milliGRAM(s) Oral two times a day    PRN Inpatient Medications  acetaminophen     Tablet .. 650 milliGRAM(s) Oral every 6 hours PRN      REVIEW OF SYSTEMS  --------------------------------------------------------------------------------    Gen: denies  fevers/chills,  CVS: denies chest pain/palpitations  Resp: denies SOB/Cough  GI: Denies N/V/Abd pain  : Denies dysuria    VITALS/PHYSICAL EXAM  --------------------------------------------------------------------------------  T(C): 36.8 (06-04-23 @ 11:16), Max: 36.8 (06-04-23 @ 11:16)  HR: 76 (06-04-23 @ 12:45) (69 - 77)  BP: 136/75 (06-04-23 @ 12:45) (124/71 - 136/75)  RR: 18 (06-04-23 @ 11:16) (18 - 18)  SpO2: 96% (06-04-23 @ 12:45) (95% - 96%)  Wt(kg): --        06-03-23 @ 07:01  -  06-04-23 @ 07:00  --------------------------------------------------------  IN: 0 mL / OUT: 500 mL / NET: -500 mL    06-04-23 @ 07:01  -  06-04-23 @ 21:18  --------------------------------------------------------  IN: 480 mL / OUT: 0 mL / NET: 480 mL      Physical Exam:  	    Gen: Non toxic comfortable appearing   	Pulm: decrease bs  no rales or ronchi or wheezing  	CV: No JVD. RRR, S1S2; no rub  	Abd: +BS, soft, nontender/nondistended  	: No suprapubic tenderness  	UE: Warm, no cyanosis  no clubbing,  no edema; no asterixis, +hand tremors have decreased   	LE: Warm, no cyanosis  no clubbing, no edema  	Neuro: alert and oriented. speech coherent   	Vascular access: R IJ HD cath, L AVF, not mature      LABS/STUDIES  --------------------------------------------------------------------------------              12.1   5.63  >-----------<  131      [06-03-23 @ 07:02]              38.5     143  |  106  |  48  ----------------------------<  97      [06-03-23 @ 07:00]  4.4   |  18  |  3.94        Ca     9.6     [06-03-23 @ 07:00]      Mg     2.5     [06-03-23 @ 07:00]      Phos  4.7     [06-03-23 @ 07:00]            Creatinine Trend:  SCr 3.94 [06-03 @ 07:00]  SCr 3.14 [06-02 @ 06:52]  SCr 2.02 [06-01 @ 14:34]              Urinalysis - [06-01-23 @ 16:25]      Color Colorless / Appearance Clear / SG 1.003 / pH 8.0      Gluc Negative / Ketone Negative  / Bili Negative / Urobili Negative       Blood Negative / Protein 30 mg/dL / Leuk Est Negative / Nitrite Negative      RBC 0 / WBC 1 / Hyaline 0 / Gran  / Sq Epi  / Non Sq Epi  / Bacteria Negative      PTH -- (Ca 9.7)      [06-03-23 @ 07:00]   276

## 2023-06-04 NOTE — DISCHARGE NOTE PROVIDER - HOSPITAL COURSE
89 y.o. female PMH AVR for severe insufficiency, mitral valve prolapse with non-severe MR, Pulmonary disease, AI and bronchiectasis, CKDpresents with Exertional dyspnea and episodes of chest and b/l arm discomfort during dialysis with possible nitrate responsive chest pain and abnormal delta trop.  s/p Cath with nonobstructive coronary disease on 6/2/23.  Patient feeling well.  Per cardiology no need for Echo and medically cleared for d/c with follow up to PMD and Neurology.

## 2023-06-04 NOTE — DISCHARGE NOTE NURSING/CASE MANAGEMENT/SOCIAL WORK - PATIENT PORTAL LINK FT
You can access the FollowMyHealth Patient Portal offered by [facility] by registering at the following website: [email or web address]. By joining [employer]’s FollowMyHealth portal, you will also be able to view your health information using other applications (apps) compatible with our system.

## 2023-06-04 NOTE — DISCHARGE NOTE PROVIDER - REASON FOR ADMISSION
Chest pain Episode of substernal chest pressure with B/L arm weakness during interrupted HD session this AM.

## 2023-06-07 PROBLEM — N18.6 END STAGE RENAL DISEASE: Chronic | Status: ACTIVE | Noted: 2023-06-01

## 2023-08-11 ENCOUNTER — APPOINTMENT (OUTPATIENT)
Dept: NEUROLOGY | Facility: CLINIC | Age: 88
End: 2023-08-11

## 2023-12-11 ENCOUNTER — APPOINTMENT (OUTPATIENT)
Dept: NEUROLOGY | Facility: CLINIC | Age: 88
End: 2023-12-11
Payer: MEDICARE

## 2023-12-11 VITALS — DIASTOLIC BLOOD PRESSURE: 79 MMHG | HEART RATE: 61 BPM | SYSTOLIC BLOOD PRESSURE: 170 MMHG

## 2023-12-11 VITALS — WEIGHT: 112 LBS | BODY MASS INDEX: 20.61 KG/M2 | HEIGHT: 62 IN

## 2023-12-11 DIAGNOSIS — R51.9 HEADACHE, UNSPECIFIED: ICD-10-CM

## 2023-12-11 PROCEDURE — 99214 OFFICE O/P EST MOD 30 MIN: CPT

## 2023-12-11 RX ORDER — FERROUS SULFATE 325(65) MG
125 MCG TABLET ORAL
Refills: 0 | Status: COMPLETED | COMMUNITY
Start: 2022-06-06 | End: 2023-12-11

## 2023-12-11 RX ORDER — SODIUM BICARBONATE
POWDER (GRAM) MISCELLANEOUS
Refills: 0 | Status: COMPLETED | COMMUNITY
Start: 2022-08-09 | End: 2023-12-11

## 2023-12-11 RX ORDER — B-COMPLEX WITH VITAMIN C
TABLET ORAL DAILY
Refills: 0 | Status: COMPLETED | COMMUNITY
Start: 2022-08-09 | End: 2023-12-11

## 2023-12-29 ENCOUNTER — APPOINTMENT (OUTPATIENT)
Dept: NEUROLOGY | Facility: CLINIC | Age: 88
End: 2023-12-29

## 2024-01-11 ENCOUNTER — NON-APPOINTMENT (OUTPATIENT)
Age: 89
End: 2024-01-11

## 2024-01-11 ENCOUNTER — APPOINTMENT (OUTPATIENT)
Dept: NEUROLOGY | Facility: CLINIC | Age: 89
End: 2024-01-11

## 2024-01-26 ENCOUNTER — APPOINTMENT (OUTPATIENT)
Dept: NEUROLOGY | Facility: CLINIC | Age: 89
End: 2024-01-26
Payer: MEDICARE

## 2024-01-26 VITALS
DIASTOLIC BLOOD PRESSURE: 77 MMHG | HEART RATE: 66 BPM | HEIGHT: 62 IN | WEIGHT: 110 LBS | BODY MASS INDEX: 20.24 KG/M2 | SYSTOLIC BLOOD PRESSURE: 170 MMHG

## 2024-01-26 PROCEDURE — 99215 OFFICE O/P EST HI 40 MIN: CPT

## 2024-01-26 NOTE — PHYSICAL EXAM
[General Appearance - Alert] : alert [General Appearance - In No Acute Distress] : in no acute distress [Oriented To Time, Place, And Person] : oriented to person, place, and time [Impaired Insight] : insight and judgment were intact [Affect] : the affect was normal [Sclera] : the sclera and conjunctiva were normal [PERRL With Normal Accommodation] : pupils were equal in size, round, reactive to light, with normal accommodation [Extraocular Movements] : extraocular movements were intact [Outer Ear] : the ears and nose were normal in appearance [Oropharynx] : the oropharynx was normal [Neck Appearance] : the appearance of the neck was normal [Neck Cervical Mass (___cm)] : no neck mass was observed [Jugular Venous Distention Increased] : there was no jugular-venous distention [Thyroid Diffuse Enlargement] : the thyroid was not enlarged [Thyroid Nodule] : there were no palpable thyroid nodules [Auscultation Breath Sounds / Voice Sounds] : lungs were clear to auscultation bilaterally [Heart Rate And Rhythm] : heart rate was normal and rhythm regular [Heart Sounds] : normal S1 and S2 [Heart Sounds Gallop] : no gallops [Heart Sounds Pericardial Friction Rub] : no pericardial rub [Edema] : there was no peripheral edema [Veins - Varicosity Changes] : there were no varicosital changes [No CVA Tenderness] : no ~M costovertebral angle tenderness [No Spinal Tenderness] : no spinal tenderness [Musculoskeletal - Swelling] : no joint swelling seen [Nail Clubbing] : no clubbing  or cyanosis of the fingernails [Skin Color & Pigmentation] : normal skin color and pigmentation [Motor Tone] : muscle strength and tone were normal [Skin Turgor] : normal skin turgor [] : no rash [FreeTextEntry1] : soft left carotid bruit

## 2024-01-26 NOTE — DISCUSSION/SUMMARY
[FreeTextEntry1] : Summary from Dr. Temple of note of 3/26/18.  In 8/17, she underwent aortic valve replacement with bioprosthetic valve. In 1/18, she was admitted to OSH with urosepsis (E Coli). She reports to be confused/disoriented and febrile at the time of admission to OSH CT brain on 1/10/18  showed evidence of acute subarachnoid hemorrhage in left high frontal and left fronto-parietal region. MRI brain did not show any evidence of acute infarct and previously noted subarachnoid hemorrhage showed complete resolution; although incidentally showed an isolated micro-hemorrhage in the right high frontal region involving the cortex/juxta-cortical region. MRA head on 1/13/18 showed a 3 mm aneurysm of the right cavernous/supraclinoid ICA.  Transesophageal echo did not show any evidence of vegetations.   Impression: 1. Nontraumatic left hemispheric (left high frontal and left frontal parietal region) subarachnoid hemorrhage of undetermined etiology; possible distal aneurysm, reversible cerebral vasoconstriction syndrome etc. 2. 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 She has mild postural instability, probably multifactorial, and otherwise she is neurologically intact.  I suspect that her previous left-sided convexity subarachnoid hemorrhage was due to amyloid angiopathy. She has an incidental, unruptured small right cavernous carotid aneurysm. At that size and in that location, this aneurysm is of minimal risk to her. There is no role for treatment. She should continue her usual activities without restrictions. As a precaution, she could have repeat MRA in one year.  10/24/19. Overall she is neurologically stable and doing well. She has chronic, mild postural instability, probably multifactorial.  I gave her a prescription for physiotherapy.  She should have repeat MRA brain in approximately 6 months to monitor her right cavernous carotid aneurysm for stability.  5/14/2020.  Overall she is neurologically stable and doing well.  She awakens with headaches diagnosed as possible "hypnic headache", and almost definitely unrelated to her small intracranial aneurysm.  Carotid Doppler (5/21/2020) showed asymptomatic right carotid stenosis of about 50%, of no clinical significance. I defer to Dr. Perez's judgment as to whether the irregular heart rate, as well as thyroid nodules, incidentally detected on Doppler, warrant further investigation.  Repeat MRA brain (6/24/2020) showed no significant change in the small right cavernous carotid aneurysm.  Given her age and the stability of the aneurysm, I do not believe that she will benefit from further routine monitoring of this aneurysm.  5/13/2021. Overall she is neurologically stable and doing well.  Dopplers show stable, moderate, asymptomatic right carotid stenosis of about 50%, for which there is no role for revascularization.  She has daytime fatigue and snoring, raising the possibility of sleep apnea, and I have requested a home sleep study  I advised her to follow-up with you to ensure optimal blood pressure control.  6/6/22.  Overall she is neurologically stable.  She has a mild-moderate postural and action tremor which interferes slightly with her activities such as putting on make-up but otherwise is nondisabling.  It is unclear to me whether this tremor is related to her chronic kidney disease.  If the tremor becomes disabling, then there may be a role for pharmacological treatment.  Repeat MRA brain (5/21/2022) showed a stable right cavernous carotid aneurysm, 2.7 mm in greatest diameter. As previously noted, given her age, follow-up imaging is not mandatory, but as a precaution, she can have repeat MRA brain in about 2 years, i.e. about 5/24.   She has significant daytime fatigue, again raising the possibility of sleep apnea and I have again referred her for a home sleep study.  She has significant low back pain and I have referred her for a physiatry evaluation.  12/29/22 - Overall she is neurologically stable, with renal and possibly pulmonary problems taking priority at present - Dopplers on 10/6/22 revealed stable moderate right ICA stenosis, 40-60%. She developed a small increase in left ICA stenosis; still mild, <40%. - She has significant daytime fatigue, again raising the possibility of sleep apnea and I have again requested a home sleep study.  1/26/2024.  Overall she is neurologically stable.  In 6/23 she had an episode of right arm shaking and/or possible weakness versus ataxia, associated with chest pain.  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  The exact diagnosis remains unclear to me.  After years of remission from either migraine or hypnic left-sided headaches, since 5/23, her headaches have recurred, either occurring during sleep at night, or towards the end of dialysis.  They are identical to her previous headaches, but are now right-sided.  I highly doubt that this reflects serious intracranial pathology such as hemorrhage or mass lesion.  I suggested that she reconsult Dr. Gildardo Alegria for further management.  She can followup in about 6 months with Dopplers.  I hope that she remains free of serious trouble.

## 2024-01-26 NOTE — REVIEW OF SYSTEMS
[Feeling Tired] : feeling tired [Recent Weight Loss (___ Lbs)] : recent [unfilled] ~Ulb weight loss [As Noted in HPI] : as noted in HPI [Shortness Of Breath] : shortness of breath [SOB on Exertion] : shortness of breath during exertion [Itching] : itching [Negative] : Heme/Lymph [FreeTextEntry2] : Probable snoring [FreeTextEntry9] : Chronic low back pain, recently exacerbated [de-identified] : Pruritus  [de-identified] : Episodes of fatigue, mental fogginess and hunger, related to "reactive hypoglycemia"

## 2024-01-26 NOTE — HISTORY OF PRESENT ILLNESS
[FreeTextEntry1] : 89-year-old ambidextrous lady  Summary from  5/3/18 She Came for an opinion regarding a right cavernous ICA aneurysm.  Summary from Dr. Humza Temple's note of 3/26/18-with modification In 8/17, she underwent aortic valve replacement with bioprosthetic aortic valve. In 1/9/18, she was admitted to the hospital with urosepsis leading to E Coli bacteremia. She underwent work up including  for possible infective endocarditis. She was treated with ABx for total of 2 weeks. During the work up, she had CT brain performed due to unknown reasons. She denies any significant HAs at that time but reported to be confused/disoriented at that time due to infection/sepsis. She denies any significant head injury before her SAH but reports to have head injury about 2 weeks ago without any LOC. She reports to be taking warfarin for 3 months after the AVR surgery. Of note, she also reports to have history of migraines on Zomig.  CT brain (1/10/18 12:12 AM): Subarachnoid hemorrhage noted in left high frontal and left fronto-parietal region  MRA head (1/13/18): Small 2-3 mm aneurysm noted involving right cavernous left supraclinoid ICA. MRI brain (1/18): No evidence of acute infarct, previously noted subarachnoid hemorrhage showed complete resolution, an isolated micro-hemorrhage noted in the right high frontal region involving the cortex/juxta-cortical region  5/3/18. She came to the office today. She reports no new focal neurologic symptoms. She was told that she does not need to take aspirin from the standpoint of her bovine AVR  Repeat MRI brain (4/18/18) to my eye  showed a small focus of susceptibility in the high right parietal cortex, consistent with either a chronic microhemorrhage, mineralization or a small cavernoma. Repeat MRA neck and head (4/18/18) showed a right cavernous carotid aneurysm, measured at 3.4 mm.  10/24/19. She Came to the Office Today. She has chronic, mild postural instability. No new focal neurologic symptoms.  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 Repeat MRA neck and head (4/30/19) showed stability of the right cavernous carotid aneurysm, measured at 3.6 x 2.1 x 2.9 mm.  5/14/2020. This service was provided using telehealth (video). The patient consented to this service. Location of patient: Home Location of provider: Office Names of all persons participating in the telehealth service and their role in the encounter: Patient  She reports anxiety related to social isolation due to COVID 19 but she is coping.  She frequently awakens with brief right-sided headaches, diagnosed by Dr. Alegria as possible "hypnic headache".  No new focal neurologic symptoms.  Carotid Doppler (5/21/2020) showed moderate heterogeneous plaque on the right with 40-60% stenosis by velocity criteria, 50% diameter reduction.  The right ICA was markedly tortuous.  The left ICA was tortuous but there was no plaque.  The extracranial vertebral arteries showed anterograde flow with a normal resistance pattern.  Heart rate was noted to be irregular.  Incidentally noted were bilateral complex thyroid nodules.  Transcranial Doppler (5/21/2020) of the [place] was normal with no sign of stenosis.  Heart rate was noted to be irregular.  Repeat MRA brain (6/24/2020) showed a right cavernous carotid aneurysm, measured at 2.7 x 2.2 mm, not significantly changed compared to the prior MRA.  5/13/2021. She came to the office today. She reports chronic lightheadedness, along with episodic "fogginess" and fatigue, possibly related to "reactive hypoglycemia". Otherwise no new focal neurologic symptoms.  Repeat carotid Doppler (5/3/2021)  showed moderate heterogeneous plaque on the right with 40-60% stenosis by velocity criteria, 50% diameter reduction.  The right ICA was markedly tortuous.  The left ICA was tortuous but there was no plaque. Heart rate was noted to be irregular.  Incidentally noted were bilateral complex thyroid nodules.  Impression.  No significant change compared to 5/2020.  Repeat transcranial Doppler (5/3/2021) was normal.  Heart rate was noted to be irregular.  Impression.  No significant change compared to 5/20.  6/6/22.  She came to the office today.  She has been diagnosed with CKD 4.  She feels generally weak, unsteady and "queasy" but without clear focal symptoms.  She has developed tremors which seem worse in the morning and improve as the day goes along.  This tremor interferes with putting on make-up but she otherwise remains functional and independent in ADLs.  Repeat MRA brain (5/21/2022-compared to 6/24/2020) showed a stable right cavernous carotid aneurysm, measured at 2.7 x 2.4 mm.  12/29/22 She came to the office today. She has been dealing with multiple medical concerns, CKD, shortness of breath and low back pain. She denies any focal neurologic symptoms.  Repeat carotid Doppler (10/6/2022) showed moderate heterogeneous plaque on the right with 40-60% stenosis by velocity criteria, 50% diameter reduction; on the left there was mild heterogeneous plaque with less than 40% stenosis. Incidentally noted were bilateral thyroid nodules. Impression. Very mild increase in left ICA stenosis compared to 5/21.  Repeat transcranial Doppler (10/6/2022) was normal.  1/26/2024.  She came to the office today.  In 6/23, she developed an episode of right arm shaking with difficulty controlling her right arm, possibly with associated weakness.  There was associated chest pain.  The right arm symptoms lasted less than 24 hours.  [facility].  Cardiac workup by her report was negative.  In the past, she had headaches, either migraine or hypnic headaches, exclusively left-sided.  Since about 5/23, the headaches have recurred, either awakening her from sleep or towards the end of dialysis, and now exclusively right-sided.  Repeat CT head (6/1/2023) showed small subdural hygromas and was otherwise unremarkable.  PCP Dr. Tucker Perez, [facility]

## 2024-01-26 NOTE — CONSULT LETTER
[Dear  ___] : Dear  [unfilled], [Consult Letter:] : I had the pleasure of evaluating your patient, [unfilled]. [Please see my note below.] : Please see my note below. [Consult Closing:] : Thank you very much for allowing me to participate in the care of this patient.  If you have any questions, please do not hesitate to contact me. [Sincerely,] : Sincerely, [FreeTextEntry2] : Dr. Tucker Perez [FreeTextEntry3] : Richard B. Libman, MD, FRCPC \par  , Neurology \par  Co-Director, Stroke Center\par  Professor of Neurology\par  [facility] [employer] at [facility]\par

## 2024-01-29 ENCOUNTER — APPOINTMENT (OUTPATIENT)
Dept: OBGYN | Facility: CLINIC | Age: 89
End: 2024-01-29

## 2024-02-02 ENCOUNTER — APPOINTMENT (OUTPATIENT)
Dept: NEUROLOGY | Facility: CLINIC | Age: 89
End: 2024-02-02

## 2024-02-09 ENCOUNTER — APPOINTMENT (OUTPATIENT)
Dept: PAIN MANAGEMENT | Facility: CLINIC | Age: 89
End: 2024-02-09
Payer: MEDICARE

## 2024-02-09 VITALS — WEIGHT: 110 LBS | HEIGHT: 62 IN | BODY MASS INDEX: 20.24 KG/M2

## 2024-02-09 DIAGNOSIS — Z86.69 PERSONAL HISTORY OF OTHER DISEASES OF THE NERVOUS SYSTEM AND SENSE ORGANS: ICD-10-CM

## 2024-02-09 PROCEDURE — 99213 OFFICE O/P EST LOW 20 MIN: CPT

## 2024-02-13 NOTE — ASSESSMENT
[FreeTextEntry1] : H/O migraine that has now retured after 7-8 years .  Pt now has dialysis TIW Dr Trip Tompkins - Neprology at [facility] [phone] Consider Ubrelvy prn  I spoke to Dr Alegria - plan to order MRI and re -try Botox.

## 2024-02-13 NOTE — REVIEW OF SYSTEMS
[Fever] : no fever [Chills] : no chills [Chest Pain] : no chest pain [Palpitations] : no palpitations [Fainting] : no fainting [Easy Bleeding] : no tendency for easy bleeding [Easy Bruising] : no tendency for easy bruising

## 2024-02-13 NOTE — PHYSICAL EXAM
[General Appearance - Alert] : alert [General Appearance - In No Acute Distress] : in no acute distress [General Appearance - Well-Appearing] : healthy appearing [] : normal voice and communication [Oriented To Time, Place, And Person] : oriented to person, place, and time [Impaired Insight] : insight and judgment were intact [Affect] : the affect was normal [Mood] : the mood was normal [Memory Remote] : remote memory was not impaired [Memory Recent] : recent memory was not impaired [Cranial Nerves Facial (VII)] : face symmetrical [Cranial Nerves Hypoglossal (XII)] : there was no tongue deviation with protrusion [Cranial Nerves Accessory (XI - Cranial And Spinal)] : head turning and shoulder shrug symmetric [Motor Strength] : muscle strength was normal in all four extremities [Sclera] : the sclera and conjunctiva were normal [PERRL With Normal Accommodation] : pupils were equal in size, round, reactive to light, with normal accommodation [Extraocular Movements] : extraocular movements were intact [Paresis Pronator Drift Right-Sided] : no pronator drift on the right [Paresis Pronator Drift Left-Sided] : no pronator drift on the left [Motor Strength Upper Extremities Bilaterally] : strength was normal in both upper extremities [Motor Strength Lower Extremities Bilaterally] : strength was normal in both lower extremities [Coordination - Dysmetria Impaired Finger-to-Nose Bilateral] : not present

## 2024-02-28 ENCOUNTER — APPOINTMENT (OUTPATIENT)
Dept: NEUROLOGY | Facility: CLINIC | Age: 89
End: 2024-02-28

## 2024-03-09 ENCOUNTER — OUTPATIENT (OUTPATIENT)
Dept: OUTPATIENT SERVICES | Facility: HOSPITAL | Age: 89
LOS: 1 days | End: 2024-03-09
Payer: MEDICARE

## 2024-03-09 ENCOUNTER — APPOINTMENT (OUTPATIENT)
Dept: MRI IMAGING | Facility: CLINIC | Age: 89
End: 2024-03-09

## 2024-03-09 DIAGNOSIS — Z00.8 ENCOUNTER FOR OTHER GENERAL EXAMINATION: ICD-10-CM

## 2024-03-09 DIAGNOSIS — I63.9 CEREBRAL INFARCTION, UNSPECIFIED: ICD-10-CM

## 2024-03-09 DIAGNOSIS — R51.9 HEADACHE, UNSPECIFIED: ICD-10-CM

## 2024-03-09 DIAGNOSIS — Z86.69 PERSONAL HISTORY OF OTHER DISEASES OF THE NERVOUS SYSTEM AND SENSE ORGANS: ICD-10-CM

## 2024-03-09 DIAGNOSIS — G43.909 MIGRAINE, UNSPECIFIED, NOT INTRACTABLE, WITHOUT STATUS MIGRAINOSUS: ICD-10-CM

## 2024-03-09 DIAGNOSIS — Z98.89 OTHER SPECIFIED POSTPROCEDURAL STATES: Chronic | ICD-10-CM

## 2024-03-09 DIAGNOSIS — Z98.890 OTHER SPECIFIED POSTPROCEDURAL STATES: Chronic | ICD-10-CM

## 2024-03-09 DIAGNOSIS — I67.1 CEREBRAL ANEURYSM, NONRUPTURED: ICD-10-CM

## 2024-03-09 DIAGNOSIS — I60.9 NONTRAUMATIC SUBARACHNOID HEMORRHAGE, UNSPECIFIED: ICD-10-CM

## 2024-03-09 DIAGNOSIS — Z98.49 CATARACT EXTRACTION STATUS, UNSPECIFIED EYE: Chronic | ICD-10-CM

## 2024-03-09 PROCEDURE — 70551 MRI BRAIN STEM W/O DYE: CPT | Mod: 26,MH

## 2024-03-09 PROCEDURE — 70551 MRI BRAIN STEM W/O DYE: CPT

## 2024-03-11 ENCOUNTER — NON-APPOINTMENT (OUTPATIENT)
Age: 89
End: 2024-03-11

## 2024-03-14 ENCOUNTER — APPOINTMENT (OUTPATIENT)
Dept: NEUROLOGY | Facility: CLINIC | Age: 89
End: 2024-03-14
Payer: MEDICARE

## 2024-03-14 VITALS — BODY MASS INDEX: 20.43 KG/M2 | WEIGHT: 111 LBS | HEIGHT: 62 IN

## 2024-03-14 VITALS — SYSTOLIC BLOOD PRESSURE: 118 MMHG | DIASTOLIC BLOOD PRESSURE: 60 MMHG

## 2024-03-14 PROCEDURE — 99215 OFFICE O/P EST HI 40 MIN: CPT

## 2024-03-14 NOTE — CONSULT LETTER
[Dear  ___] : Dear  [unfilled], [Consult Letter:] : I had the pleasure of evaluating your patient, [unfilled]. [Consult Closing:] : Thank you very much for allowing me to participate in the care of this patient.  If you have any questions, please do not hesitate to contact me. [Please see my note below.] : Please see my note below. [Sincerely,] : Sincerely, [FreeTextEntry2] : Dr. Tucker Perez [FreeTextEntry3] : Richard B. Libman, MD, FRCPC \par  , Neurology \par  Co-Director, Stroke Center\par  Professor of Neurology\par  [facility] [employer] at [facility]\par

## 2024-03-14 NOTE — DISCUSSION/SUMMARY
[FreeTextEntry1] : Summary from Dr. Temple of note of 3/26/18.  In 8/17, she underwent aortic valve replacement with bioprosthetic valve. In 1/18, she was admitted to OSH with urosepsis (E Coli). She reports to be confused/disoriented and febrile at the time of admission to OSH CT brain on 1/10/18  showed evidence of acute subarachnoid hemorrhage in left high frontal and left fronto-parietal region. MRI brain did not show any evidence of acute infarct and previously noted subarachnoid hemorrhage showed complete resolution; although incidentally showed an isolated micro-hemorrhage in the right high frontal region involving the cortex/juxta-cortical region. MRA head on 1/13/18 showed a 3 mm aneurysm of the right cavernous/supraclinoid ICA.  Transesophageal echo did not show any evidence of vegetations.   Impression: 1. Nontraumatic left hemispheric (left high frontal and left frontal parietal region) subarachnoid hemorrhage of undetermined etiology; possible distal aneurysm, reversible cerebral vasoconstriction syndrome etc. 2. 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 She has mild postural instability, probably multifactorial, and otherwise she is neurologically intact.  I suspect that her previous left-sided convexity subarachnoid hemorrhage was due to amyloid angiopathy. She has an incidental, unruptured small right cavernous carotid aneurysm. At that size and in that location, this aneurysm is of minimal risk to her. There is no role for treatment. She should continue her usual activities without restrictions. As a precaution, she could have repeat MRA in one year.  10/24/19. Overall she is neurologically stable and doing well. She has chronic, mild postural instability, probably multifactorial.  I gave her a prescription for physiotherapy.  She should have repeat MRA brain in approximately 6 months to monitor her right cavernous carotid aneurysm for stability.  5/14/2020.  Overall she is neurologically stable and doing well.  She awakens with headaches diagnosed as possible "hypnic headache", and almost definitely unrelated to her small intracranial aneurysm.  Carotid Doppler (5/21/2020) showed asymptomatic right carotid stenosis of about 50%, of no clinical significance. I defer to Dr. Perez's judgment as to whether the irregular heart rate, as well as thyroid nodules, incidentally detected on Doppler, warrant further investigation.  Repeat MRA brain (6/24/2020) showed no significant change in the small right cavernous carotid aneurysm.  Given her age and the stability of the aneurysm, I do not believe that she will benefit from further routine monitoring of this aneurysm.  5/13/2021. Overall she is neurologically stable and doing well.  Dopplers show stable, moderate, asymptomatic right carotid stenosis of about 50%, for which there is no role for revascularization.  She has daytime fatigue and snoring, raising the possibility of sleep apnea, and I have requested a home sleep study  I advised her to follow-up with you to ensure optimal blood pressure control.  6/6/22.  Overall she is neurologically stable.  She has a mild-moderate postural and action tremor which interferes slightly with her activities such as putting on make-up but otherwise is nondisabling.  It is unclear to me whether this tremor is related to her chronic kidney disease.  If the tremor becomes disabling, then there may be a role for pharmacological treatment.  Repeat MRA brain (5/21/2022) showed a stable right cavernous carotid aneurysm, 2.7 mm in greatest diameter. As previously noted, given her age, follow-up imaging is not mandatory, but as a precaution, she can have repeat MRA brain in about 2 years, i.e. about 5/24.   She has significant daytime fatigue, again raising the possibility of sleep apnea and I have again referred her for a home sleep study.  She has significant low back pain and I have referred her for a physiatry evaluation.  12/29/22 - Overall she is neurologically stable, with renal and possibly pulmonary problems taking priority at present - Dopplers on 10/6/22 revealed stable moderate right ICA stenosis, 40-60%. She developed a small increase in left ICA stenosis; still mild, <40%. - She has significant daytime fatigue, again raising the possibility of sleep apnea and I have again requested a home sleep study.  1/26/2024.  Overall she is neurologically stable.  In 6/23 she had an episode of right arm shaking and/or possible weakness versus ataxia, associated with chest pain.  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  The exact diagnosis remains unclear to me.  After years of remission from either migraine or hypnic left-sided headaches, since 5/23, her headaches have recurred, either occurring during sleep at night, or towards the end of dialysis.  They are identical to her previous headaches, but are now right-sided.  I highly doubt that this reflects serious intracranial pathology such as hemorrhage or mass lesion.  I suggested that she reconsult Dr. Gildardo Alegria for further management.  3/14/24 - Overall she is neurologically stable. - MRI brain (3/9/2024) was unremarkable, with no evidence of recent infarction or hemorrhage, perhaps suggesting that her worsening headaches represent her previous migraine or hypnic headaches. -She stated that triptans are the only treatment which have helped her headaches in the past.  In light of the fact that she never had a clinical ischemic stroke, she only has a tiny chronic cerebellar lacunar infarct (clinically silent), I believe the benefits of triptan therapy would outweigh the associated risks.  - Given her age and the stability of the right cavernous carotid aneurysm; there is no role for repeating an MRA.   She can follow up in about 4 months with Dopplers.  I hope that she remains free of serious trouble.

## 2024-03-14 NOTE — HISTORY OF PRESENT ILLNESS
[FreeTextEntry1] : 89-year-old ambidextrous lady  Summary from  5/3/18 She Came for an opinion regarding a right cavernous ICA aneurysm.  Summary from Dr. Humza Temple's note of 3/26/18-with modification In 8/17, she underwent aortic valve replacement with bioprosthetic aortic valve. In 1/9/18, she was admitted to the hospital with urosepsis leading to E Coli bacteremia. She underwent work up including  for possible infective endocarditis. She was treated with ABx for total of 2 weeks. During the work up, she had CT brain performed due to unknown reasons. She denies any significant HAs at that time but reported to be confused/disoriented at that time due to infection/sepsis. She denies any significant head injury before her SAH but reports to have head injury about 2 weeks ago without any LOC. She reports to be taking warfarin for 3 months after the AVR surgery. Of note, she also reports to have history of migraines on Zomig.  CT brain (1/10/18 12:12 AM): Subarachnoid hemorrhage noted in left high frontal and left fronto-parietal region  MRA head (1/13/18): Small 2-3 mm aneurysm noted involving right cavernous left supraclinoid ICA. MRI brain (1/18): No evidence of acute infarct, previously noted subarachnoid hemorrhage showed complete resolution, an isolated micro-hemorrhage noted in the right high frontal region involving the cortex/juxta-cortical region  5/3/18. She came to the office today. She reports no new focal neurologic symptoms. She was told that she does not need to take aspirin from the standpoint of her bovine AVR  Repeat MRI brain (4/18/18) to my eye  showed a small focus of susceptibility in the high right parietal cortex, consistent with either a chronic microhemorrhage, mineralization or a small cavernoma. Repeat MRA neck and head (4/18/18) showed a right cavernous carotid aneurysm, measured at 3.4 mm.  10/24/19. She Came to the Office Today. She has chronic, mild postural instability. No new focal neurologic symptoms.  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 Repeat MRA neck and head (4/30/19) showed stability of the right cavernous carotid aneurysm, measured at 3.6 x 2.1 x 2.9 mm.  5/14/2020. This service was provided using telehealth (video). The patient consented to this service. Location of patient: Home Location of provider: Office Names of all persons participating in the telehealth service and their role in the encounter: Patient  She reports anxiety related to social isolation due to COVID 19 but she is coping.  She frequently awakens with brief right-sided headaches, diagnosed by Dr. Alegria as possible "hypnic headache".  No new focal neurologic symptoms.  Carotid Doppler (5/21/2020) showed moderate heterogeneous plaque on the right with 40-60% stenosis by velocity criteria, 50% diameter reduction.  The right ICA was markedly tortuous.  The left ICA was tortuous but there was no plaque.  The extracranial vertebral arteries showed anterograde flow with a normal resistance pattern.  Heart rate was noted to be irregular.  Incidentally noted were bilateral complex thyroid nodules.  Transcranial Doppler (5/21/2020) of the [place] was normal with no sign of stenosis.  Heart rate was noted to be irregular.  Repeat MRA brain (6/24/2020) showed a right cavernous carotid aneurysm, measured at 2.7 x 2.2 mm, not significantly changed compared to the prior MRA.  5/13/2021. She came to the office today. She reports chronic lightheadedness, along with episodic "fogginess" and fatigue, possibly related to "reactive hypoglycemia". Otherwise no new focal neurologic symptoms.  Repeat carotid Doppler (5/3/2021)  showed moderate heterogeneous plaque on the right with 40-60% stenosis by velocity criteria, 50% diameter reduction.  The right ICA was markedly tortuous.  The left ICA was tortuous but there was no plaque. Heart rate was noted to be irregular.  Incidentally noted were bilateral complex thyroid nodules.  Impression.  No significant change compared to 5/2020.  Repeat transcranial Doppler (5/3/2021) was normal.  Heart rate was noted to be irregular.  Impression.  No significant change compared to 5/20.  6/6/22.  She came to the office today.  She has been diagnosed with CKD 4.  She feels generally weak, unsteady and "queasy" but without clear focal symptoms.  She has developed tremors which seem worse in the morning and improve as the day goes along.  This tremor interferes with putting on make-up but she otherwise remains functional and independent in ADLs.  Repeat MRA brain (5/21/2022-compared to 6/24/2020) showed a stable right cavernous carotid aneurysm, measured at 2.7 x 2.4 mm.  12/29/22 She came to the office today. She has been dealing with multiple medical concerns, CKD, shortness of breath and low back pain. She denies any focal neurologic symptoms.  Repeat carotid Doppler (10/6/2022) showed moderate heterogeneous plaque on the right with 40-60% stenosis by velocity criteria, 50% diameter reduction; on the left there was mild heterogeneous plaque with less than 40% stenosis. Incidentally noted were bilateral thyroid nodules. Impression. Very mild increase in left ICA stenosis compared to 5/21.  Repeat transcranial Doppler (10/6/2022) was normal.  1/26/2024.  She came to the office today.  In 6/23, she developed an episode of right arm shaking with difficulty controlling her right arm, possibly with associated weakness.  There was associated chest pain.  The right arm symptoms lasted less than 24 hours.  She was [facility] to [facility].  Cardiac workup by her report was negative.  In the past, she had headaches, either migraine or hypnic headaches, exclusively left-sided.  Since about 5/23, the headaches have recurred, either awakening her from sleep or towards the end of dialysis, and now exclusively right-sided.  Repeat CT head (6/1/2023) showed small subdural hygromas and was otherwise unremarkable.  3/14/24 She presents to the office today. She has been experiencing recurrent migraines following her dialysis sessions and is requesting neurovascular clearance to use a triptan. She denies any new focal neurologic symptoms.  MRI brain requested by Dr. Gildardo Alegria and Miranda Barber, JORDON for worsening headaches (3/9/2024) to my eye showed a chronic, punctate lacunar infarct in the left cerebellar hemisphere; mild-moderate periventricular and subcortical white matter hyperintensities of presumed vascular origin.   PCP Dr. Tucker Perez, [facility], [place]

## 2024-03-14 NOTE — REVIEW OF SYSTEMS
[Feeling Tired] : feeling tired [Recent Weight Loss (___ Lbs)] : recent [unfilled] ~Ulb weight loss [As Noted in HPI] : as noted in HPI [Shortness Of Breath] : shortness of breath [SOB on Exertion] : shortness of breath during exertion [Itching] : itching [Negative] : Heme/Lymph [FreeTextEntry2] : Probable snoring [FreeTextEntry9] : Chronic low back pain, recently exacerbated [de-identified] : Pruritus  [de-identified] : Episodes of fatigue, mental fogginess and hunger, related to "reactive hypoglycemia"

## 2024-03-15 ENCOUNTER — APPOINTMENT (OUTPATIENT)
Dept: MRI IMAGING | Facility: CLINIC | Age: 89
End: 2024-03-15

## 2024-04-08 RX ORDER — ZOLMITRIPTAN 5 MG/1
5 SPRAY NASAL
Qty: 2 | Refills: 3 | Status: ACTIVE | COMMUNITY
Start: 2024-04-08 | End: 1900-01-01

## 2024-04-09 RX ORDER — ZOLMITRIPTAN 5 MG/1
5 TABLET, FILM COATED ORAL
Qty: 18 | Refills: 0 | Status: DISCONTINUED | COMMUNITY
Start: 2024-04-08 | End: 2024-04-09

## 2024-04-09 RX ORDER — SUMATRIPTAN 50 MG/1
50 TABLET, FILM COATED ORAL
Qty: 1 | Refills: 3 | Status: ACTIVE | COMMUNITY
Start: 2024-04-09 | End: 1900-01-01

## 2024-04-20 ENCOUNTER — INPATIENT (INPATIENT)
Facility: HOSPITAL | Age: 89
LOS: 2 days | Discharge: HOME | DRG: 948 | End: 2024-04-23
Attending: INTERNAL MEDICINE | Admitting: INTERNAL MEDICINE
Payer: MEDICARE

## 2024-04-20 VITALS
WEIGHT: 111.99 LBS | HEIGHT: 62 IN | DIASTOLIC BLOOD PRESSURE: 75 MMHG | TEMPERATURE: 98 F | SYSTOLIC BLOOD PRESSURE: 178 MMHG | OXYGEN SATURATION: 99 % | HEART RATE: 65 BPM | RESPIRATION RATE: 18 BRPM

## 2024-04-20 DIAGNOSIS — Z98.890 OTHER SPECIFIED POSTPROCEDURAL STATES: Chronic | ICD-10-CM

## 2024-04-20 DIAGNOSIS — Z98.49 CATARACT EXTRACTION STATUS, UNSPECIFIED EYE: Chronic | ICD-10-CM

## 2024-04-20 DIAGNOSIS — Z98.89 OTHER SPECIFIED POSTPROCEDURAL STATES: Chronic | ICD-10-CM

## 2024-04-20 LAB
ALBUMIN SERPL ELPH-MCNC: 3.9 G/DL — SIGNIFICANT CHANGE UP (ref 3.3–5)
ALP SERPL-CCNC: 107 U/L — SIGNIFICANT CHANGE UP (ref 40–120)
ALT FLD-CCNC: 40 U/L — SIGNIFICANT CHANGE UP (ref 10–45)
ANION GAP SERPL CALC-SCNC: 16 MMOL/L — SIGNIFICANT CHANGE UP (ref 5–17)
APTT BLD: 27.4 SEC — SIGNIFICANT CHANGE UP (ref 24.5–35.6)
AST SERPL-CCNC: 43 U/L — HIGH (ref 10–40)
BILIRUB SERPL-MCNC: 0.6 MG/DL — SIGNIFICANT CHANGE UP (ref 0.2–1.2)
BUN SERPL-MCNC: 61 MG/DL — HIGH (ref 7–23)
CALCIUM SERPL-MCNC: 9.5 MG/DL — SIGNIFICANT CHANGE UP (ref 8.4–10.5)
CHLORIDE SERPL-SCNC: 101 MMOL/L — SIGNIFICANT CHANGE UP (ref 96–108)
CO2 SERPL-SCNC: 20 MMOL/L — LOW (ref 22–31)
CREAT SERPL-MCNC: 3.58 MG/DL — HIGH (ref 0.5–1.3)
EGFR: 12 ML/MIN/1.73M2 — LOW
GLUCOSE SERPL-MCNC: 104 MG/DL — HIGH (ref 70–99)
INR BLD: 1.06 RATIO — SIGNIFICANT CHANGE UP (ref 0.85–1.18)
NT-PROBNP SERPL-SCNC: HIGH PG/ML (ref 0–300)
POTASSIUM SERPL-MCNC: 4.8 MMOL/L — SIGNIFICANT CHANGE UP (ref 3.5–5.3)
POTASSIUM SERPL-SCNC: 4.8 MMOL/L — SIGNIFICANT CHANGE UP (ref 3.5–5.3)
PROT SERPL-MCNC: 6.9 G/DL — SIGNIFICANT CHANGE UP (ref 6–8.3)
PROTHROM AB SERPL-ACNC: 11.1 SEC — SIGNIFICANT CHANGE UP (ref 9.5–13)
SODIUM SERPL-SCNC: 137 MMOL/L — SIGNIFICANT CHANGE UP (ref 135–145)
TROPONIN T, HIGH SENSITIVITY RESULT: 41 NG/L — SIGNIFICANT CHANGE UP (ref 0–51)

## 2024-04-20 PROCEDURE — 99285 EMERGENCY DEPT VISIT HI MDM: CPT | Mod: GC

## 2024-04-20 PROCEDURE — 71046 X-RAY EXAM CHEST 2 VIEWS: CPT | Mod: 26

## 2024-04-20 RX ORDER — ACETAMINOPHEN 500 MG
750 TABLET ORAL ONCE
Refills: 0 | Status: COMPLETED | OUTPATIENT
Start: 2024-04-20 | End: 2024-04-20

## 2024-04-20 RX ORDER — LIDOCAINE 4 G/100G
1 CREAM TOPICAL ONCE
Refills: 0 | Status: COMPLETED | OUTPATIENT
Start: 2024-04-20 | End: 2024-04-20

## 2024-04-20 RX ADMIN — Medication 300 MILLIGRAM(S): at 23:11

## 2024-04-20 RX ADMIN — LIDOCAINE 1 PATCH: 4 CREAM TOPICAL at 23:11

## 2024-04-20 NOTE — ED PROVIDER NOTE - OBJECTIVE STATEMENT
89 year old female with hx of ESRD on HD T/Th/Sat, last session on Thursday, HTN, s/p AVR, no on A/C, presents to the ED complaining of bilateral LE, L>R, with LLE weeping. She reports the swelling started last week, but the weeping started today. She missed dialysis today because it is her birthday. She is also endorsing shortness of breath, which she states is baseline for her with exertion. However today she states she is more short of breath at rest. She also states she developed right sided mid back pain today. Denies PE/DVT, recent travel, surgery, immobilization, hx cancer. 89 year old female with hx of ESRD on HD T/Th/Sat, last session on Thursday, HTN, s/p AVR, no on A/C, presents to the ED complaining of bilateral LE, L>R, with LLE weeping. She reports the swelling started last week, but the weeping started today. She missed dialysis today because it is her birthday. She is also endorsing shortness of breath, which she states is baseline for her with exertion. However today she states she is more short of breath at rest. She also states she developed right sided mid back pain today. Denies PE/DVT, recent travel, surgery, immobilization, hx cancer. Denies fever, chills, nausea, vomiting, chest pain, abdominal pain, cough, URI symptoms.

## 2024-04-20 NOTE — ED PROVIDER NOTE - CLINICAL SUMMARY MEDICAL DECISION MAKING FREE TEXT BOX
89F with hx fo AVR, not on A/c, HTN, and ESRD, skipped dialysis today (last session Thursday), who presents for 1 week of bilateral LE swelling, L>R, with LLE weeping today. Associated shortness of breath. Appears tachypneic and fluid overloaded with L>R LE edema. Ddx includes fluid overload 2/2 missed dialysis and kidney dysfunction, aortic valve dysfunction, PE, DVT. Plan for cbc, cmp, trop, bnp, ecg, cxr, LE ultrasound, bedside echo.

## 2024-04-20 NOTE — ED PROVIDER NOTE - ATTENDING CONTRIBUTION TO CARE
Gianfranco Osborne DO: I have personally performed a face to face medical and diagnostic evaluation of the patient. I have discussed with and reviewed the Resident's and/or ACP's and/or Medical/PA/NP student's note and agree with the History, ROS, Physical Exam and MDM unless otherwise indicated. A brief summary of my personal evaluation and impression can be found below.     89 year old female with hx of ESRD on HD T/Th/Sat, last session on Thursday, HTN, s/p AVR, no on A/C, presents to the ED complaining of bilateral LE, L>R, with LLE weeping. She reports the swelling started last week, but the weeping started today. She missed dialysis today because it is her birthday. She is also endorsing shortness of breath, which she states is baseline for her with exertion. However today she states she is more short of breath at rest. She also states she developed right sided mid back pain today. Denies PE/DVT, recent travel, surgery, immobilization, hx cancer. Denies fever, chills, nausea, vomiting, chest pain, abdominal pain, cough, URI symptoms. Not currently on AC.     CONSTITUTIONAL: NAD  SKIN: Warm dry, normal skin turgor, serous fluid leaking from LLE .5cm skin tear. b/l mid-back erythema without vesicles, bleeding, or other rash.  HEAD: NCAT  NECK: Supple; non tender. Full ROM.  CARD: RRR, no murmurs.  RESP: mild bl basilar crackles  ABD: soft, non-tender, non-distended, no rebound or guarding.  MSK: 2+ pitting edema b/l, no calf tenderness  PSYCH: Cooperative, appropriate.     serous leg weeping w/o surrounding erythema, ecchymosis, infection less likely. b/l leg swelling, less likely 2/2 dvt but will get US to evaluate, possibly 2/2 heart failure iso valvulopathy/fluid overload because of missed dialysis will get probnp, trop to evaluate, pt makes urine will likely give lasix, pt's back pain of uncertain etiology less likely PE given normal HR, favor fluid overload iso hypertension, will get cxr, chest pain labs, ekg, anticipate admission for dialysis for fluid removal. No indication for bipap as pt is currently not having any dyspnea or respiratory distress at this.

## 2024-04-20 NOTE — ED ADULT NURSE NOTE - OBJECTIVE STATEMENT
Pt is a 89y F PMH HTN, ESRD on HD T/Th/Sa p/w LLE edema. Pt reports small weeping abrasion to posterior L calf. Reports some edema to LLE. Pt missed HD today. Reports mild SOB and R upper back pain. Denies fevers, chest pain, palpitations, urinary symptoms. A&Ox4, [place], lungs clear, distal pulses intact, abdomen soft, skin with small tear to posterior L calf. Side rails up for safety, call bell and personal items within reach, instructed to call for assistance, verbalizes understanding. Will continue to monitor.

## 2024-04-20 NOTE — ED PROVIDER NOTE - PHYSICAL EXAMINATION
GEN: NAD, awake, eyes open spontaneously  EYES: normal conjunctiva, perrl  ENT: NCAT, MMM, Trachea midline  CHEST/LUNGS: Appears tachypneic, CTAB, bilateral breath sounds  CARDIAC: Non-tachycardic, + murmur, normal perfusion  ABDOMEN: Soft, NTND, No rebound/guarding  MSK: 2+ edema bilateral LE, L>R, with weeping of LLE. No gross deformity of extremities  BACK: Right rhomboid tenderness to palpation. No midline thoracic or lumbar tenderness to palpation.   SKIN: No rashes, no petechiae, no vesicles  NEURO: Ambulatory. A&Ox3, CN grossly intact, normal coordination, no focal motor or sensory deficits  PSYCH: Alert, appropriate, cooperative, with capacity and insight

## 2024-04-20 NOTE — ED PROVIDER NOTE - NSICDXFAMILYHX_GEN_ALL_CORE_FT
FAMILY HISTORY:  Father  Still living? Unknown  FH: CAD (coronary artery disease), Age at diagnosis: Age Unknown  FH: MI (myocardial infarction), Age at diagnosis: Age Unknown    
Class I (easy) - visualization of the soft palate, fauces, uvula, and both anterior and posterior pillars

## 2024-04-20 NOTE — ED ADULT NURSE NOTE - NSFALLHARMRISKINTERV_ED_ALL_ED

## 2024-04-20 NOTE — ED PROVIDER NOTE - PROGRESS NOTE DETAILS
Arely Damon,  (PGY-1): DVT studies negative. CT without acute findings. Arely Damon, DO (PGY-1): DVT studies negative. CT without acute findings. Labs notable for elevated bnp 34K - given Lasix 40mg IVP. Serial trops stable. Will admit for dialysis in AM. Patient agreeable with plan.

## 2024-04-20 NOTE — ED PROVIDER NOTE - NS ED ROS FT
CONSTITUTIONAL: No fevers, no chills, no lightheadedness, no dizziness  NOSE: no nasal congestion  MOUTH/THROAT: no sore throat  CV: No chest pain, no palpitations  RESP: + SOB, no cough  GI: No n/v/d, no abd pain  : no dysuria, no hematuria, no flank pain  MSK: + back pain, no extremity pain, + leg swelling   SKIN: no rashes  NEURO: no headache, no focal weakness, no decreased sensation/parasthesias

## 2024-04-20 NOTE — ED ADULT TRIAGE NOTE - HOW PATIENT ADDRESSED, PROFILE
Mrs Aragon Consent: The patient's consent was obtained including but not limited to risks of crusting, scabbing, blistering, scarring, darker or lighter pigmentary change, recurrence, incomplete removal and infection. Show Aperture Variable?: Yes Duration Of Freeze Thaw-Cycle (Seconds): 0 Post-Care Instructions: I reviewed with the patient in detail post-care instructions. Patient is to wear sunprotection, and avoid picking at any of the treated lesions. Pt may apply Vaseline to crusted or scabbing areas. Render Post-Care Instructions In Note?: no Detail Level: Detailed

## 2024-04-21 DIAGNOSIS — R60.0 LOCALIZED EDEMA: ICD-10-CM

## 2024-04-21 DIAGNOSIS — Z29.9 ENCOUNTER FOR PROPHYLACTIC MEASURES, UNSPECIFIED: ICD-10-CM

## 2024-04-21 DIAGNOSIS — J47.9 BRONCHIECTASIS, UNCOMPLICATED: ICD-10-CM

## 2024-04-21 DIAGNOSIS — I10 ESSENTIAL (PRIMARY) HYPERTENSION: ICD-10-CM

## 2024-04-21 DIAGNOSIS — E87.70 FLUID OVERLOAD, UNSPECIFIED: ICD-10-CM

## 2024-04-21 LAB
ALBUMIN SERPL ELPH-MCNC: 3.8 G/DL — SIGNIFICANT CHANGE UP (ref 3.3–5)
ALP SERPL-CCNC: 115 U/L — SIGNIFICANT CHANGE UP (ref 40–120)
ALT FLD-CCNC: 45 U/L — SIGNIFICANT CHANGE UP (ref 10–45)
ANION GAP SERPL CALC-SCNC: 16 MMOL/L — SIGNIFICANT CHANGE UP (ref 5–17)
ANISOCYTOSIS BLD QL: SLIGHT — SIGNIFICANT CHANGE UP
AST SERPL-CCNC: 41 U/L — HIGH (ref 10–40)
BASOPHILS # BLD AUTO: 0 K/UL — SIGNIFICANT CHANGE UP (ref 0–0.2)
BASOPHILS NFR BLD AUTO: 0 % — SIGNIFICANT CHANGE UP (ref 0–2)
BILIRUB SERPL-MCNC: 0.7 MG/DL — SIGNIFICANT CHANGE UP (ref 0.2–1.2)
BUN SERPL-MCNC: 72 MG/DL — HIGH (ref 7–23)
CALCIUM SERPL-MCNC: 9.2 MG/DL — SIGNIFICANT CHANGE UP (ref 8.4–10.5)
CHLORIDE SERPL-SCNC: 102 MMOL/L — SIGNIFICANT CHANGE UP (ref 96–108)
CO2 SERPL-SCNC: 19 MMOL/L — LOW (ref 22–31)
CREAT SERPL-MCNC: 4 MG/DL — HIGH (ref 0.5–1.3)
EGFR: 10 ML/MIN/1.73M2 — LOW
EOSINOPHIL # BLD AUTO: 0.3 K/UL — SIGNIFICANT CHANGE UP (ref 0–0.5)
EOSINOPHIL NFR BLD AUTO: 4.5 % — SIGNIFICANT CHANGE UP (ref 0–6)
GIANT PLATELETS BLD QL SMEAR: PRESENT — SIGNIFICANT CHANGE UP
GLUCOSE SERPL-MCNC: 110 MG/DL — HIGH (ref 70–99)
HCT VFR BLD CALC: 34 % — LOW (ref 34.5–45)
HCT VFR BLD CALC: 34.2 % — LOW (ref 34.5–45)
HGB BLD-MCNC: 11.3 G/DL — LOW (ref 11.5–15.5)
HGB BLD-MCNC: 11.3 G/DL — LOW (ref 11.5–15.5)
LYMPHOCYTES # BLD AUTO: 0.49 K/UL — LOW (ref 1–3.3)
LYMPHOCYTES # BLD AUTO: 7.2 % — LOW (ref 13–44)
MACROCYTES BLD QL: SLIGHT — SIGNIFICANT CHANGE UP
MANUAL SMEAR VERIFICATION: SIGNIFICANT CHANGE UP
MCHC RBC-ENTMCNC: 33 GM/DL — SIGNIFICANT CHANGE UP (ref 32–36)
MCHC RBC-ENTMCNC: 33.2 GM/DL — SIGNIFICANT CHANGE UP (ref 32–36)
MCHC RBC-ENTMCNC: 34.6 PG — HIGH (ref 27–34)
MCHC RBC-ENTMCNC: 34.8 PG — HIGH (ref 27–34)
MCV RBC AUTO: 104 FL — HIGH (ref 80–100)
MCV RBC AUTO: 105.2 FL — HIGH (ref 80–100)
MONOCYTES # BLD AUTO: 0.55 K/UL — SIGNIFICANT CHANGE UP (ref 0–0.9)
MONOCYTES NFR BLD AUTO: 8.1 % — SIGNIFICANT CHANGE UP (ref 2–14)
NEUTROPHILS # BLD AUTO: 5.42 K/UL — SIGNIFICANT CHANGE UP (ref 1.8–7.4)
NEUTROPHILS NFR BLD AUTO: 80.2 % — HIGH (ref 43–77)
NRBC # BLD: 0 /100 WBCS — SIGNIFICANT CHANGE UP (ref 0–0)
PLAT MORPH BLD: NORMAL — SIGNIFICANT CHANGE UP
PLATELET # BLD AUTO: 114 K/UL — LOW (ref 150–400)
PLATELET # BLD AUTO: 121 K/UL — LOW (ref 150–400)
POTASSIUM SERPL-MCNC: 4.8 MMOL/L — SIGNIFICANT CHANGE UP (ref 3.5–5.3)
POTASSIUM SERPL-SCNC: 4.8 MMOL/L — SIGNIFICANT CHANGE UP (ref 3.5–5.3)
PROT SERPL-MCNC: 6.6 G/DL — SIGNIFICANT CHANGE UP (ref 6–8.3)
RBC # BLD: 3.25 M/UL — LOW (ref 3.8–5.2)
RBC # BLD: 3.27 M/UL — LOW (ref 3.8–5.2)
RBC # FLD: 13.5 % — SIGNIFICANT CHANGE UP (ref 10.3–14.5)
RBC # FLD: 13.7 % — SIGNIFICANT CHANGE UP (ref 10.3–14.5)
RBC BLD AUTO: SIGNIFICANT CHANGE UP
SODIUM SERPL-SCNC: 137 MMOL/L — SIGNIFICANT CHANGE UP (ref 135–145)
TROPONIN T, HIGH SENSITIVITY RESULT: 41 NG/L — SIGNIFICANT CHANGE UP (ref 0–51)
WBC # BLD: 6.45 K/UL — SIGNIFICANT CHANGE UP (ref 3.8–10.5)
WBC # BLD: 6.76 K/UL — SIGNIFICANT CHANGE UP (ref 3.8–10.5)
WBC # FLD AUTO: 6.45 K/UL — SIGNIFICANT CHANGE UP (ref 3.8–10.5)
WBC # FLD AUTO: 6.76 K/UL — SIGNIFICANT CHANGE UP (ref 3.8–10.5)

## 2024-04-21 PROCEDURE — 74176 CT ABD & PELVIS W/O CONTRAST: CPT | Mod: 26,MC

## 2024-04-21 PROCEDURE — 99222 1ST HOSP IP/OBS MODERATE 55: CPT

## 2024-04-21 PROCEDURE — 99223 1ST HOSP IP/OBS HIGH 75: CPT

## 2024-04-21 PROCEDURE — 93971 EXTREMITY STUDY: CPT | Mod: 26,LT

## 2024-04-21 PROCEDURE — 93308 TTE F-UP OR LMTD: CPT | Mod: 26

## 2024-04-21 PROCEDURE — 71250 CT THORAX DX C-: CPT | Mod: 26,MC

## 2024-04-21 RX ORDER — FUROSEMIDE 40 MG
40 TABLET ORAL ONCE
Refills: 0 | Status: COMPLETED | OUTPATIENT
Start: 2024-04-21 | End: 2024-04-21

## 2024-04-21 RX ORDER — LANOLIN ALCOHOL/MO/W.PET/CERES
3 CREAM (GRAM) TOPICAL AT BEDTIME
Refills: 0 | Status: DISCONTINUED | OUTPATIENT
Start: 2024-04-21 | End: 2024-04-23

## 2024-04-21 RX ORDER — METOPROLOL TARTRATE 50 MG
25 TABLET ORAL AT BEDTIME
Refills: 0 | Status: DISCONTINUED | OUTPATIENT
Start: 2024-04-21 | End: 2024-04-23

## 2024-04-21 RX ORDER — AMLODIPINE BESYLATE 2.5 MG/1
1 TABLET ORAL
Refills: 0 | DISCHARGE

## 2024-04-21 RX ORDER — ONDANSETRON 8 MG/1
4 TABLET, FILM COATED ORAL EVERY 8 HOURS
Refills: 0 | Status: DISCONTINUED | OUTPATIENT
Start: 2024-04-21 | End: 2024-04-23

## 2024-04-21 RX ORDER — ACETAMINOPHEN 500 MG
650 TABLET ORAL EVERY 6 HOURS
Refills: 0 | Status: DISCONTINUED | OUTPATIENT
Start: 2024-04-21 | End: 2024-04-23

## 2024-04-21 RX ORDER — METOPROLOL TARTRATE 50 MG
50 TABLET ORAL ONCE
Refills: 0 | Status: COMPLETED | OUTPATIENT
Start: 2024-04-21 | End: 2024-04-21

## 2024-04-21 RX ORDER — ASCORBIC ACID 60 MG
0 TABLET,CHEWABLE ORAL
Refills: 0 | DISCHARGE

## 2024-04-21 RX ORDER — OXYCODONE HYDROCHLORIDE 5 MG/1
2.5 TABLET ORAL ONCE
Refills: 0 | Status: DISCONTINUED | OUTPATIENT
Start: 2024-04-21 | End: 2024-04-21

## 2024-04-21 RX ORDER — LACTOBACILLUS ACIDOPHILUS 100MM CELL
1 CAPSULE ORAL DAILY
Refills: 0 | Status: DISCONTINUED | OUTPATIENT
Start: 2024-04-21 | End: 2024-04-23

## 2024-04-21 RX ORDER — METOPROLOL TARTRATE 50 MG
50 TABLET ORAL DAILY
Refills: 0 | Status: DISCONTINUED | OUTPATIENT
Start: 2024-04-21 | End: 2024-04-23

## 2024-04-21 RX ORDER — METOPROLOL TARTRATE 50 MG
0.5 TABLET ORAL
Refills: 0 | DISCHARGE

## 2024-04-21 RX ADMIN — OXYCODONE HYDROCHLORIDE 2.5 MILLIGRAM(S): 5 TABLET ORAL at 08:23

## 2024-04-21 RX ADMIN — OXYCODONE HYDROCHLORIDE 2.5 MILLIGRAM(S): 5 TABLET ORAL at 10:00

## 2024-04-21 RX ADMIN — LIDOCAINE 1 PATCH: 4 CREAM TOPICAL at 11:42

## 2024-04-21 RX ADMIN — Medication 50 MILLIGRAM(S): at 05:45

## 2024-04-21 RX ADMIN — Medication 40 MILLIGRAM(S): at 00:45

## 2024-04-21 RX ADMIN — Medication 25 MILLIGRAM(S): at 21:21

## 2024-04-21 NOTE — H&P ADULT - HISTORY OF PRESENT ILLNESS
89 y/o F with PMH of ESRD on HD T/Th/Sat, last session on Thursday, HTN, s/p AVR, not on A/C, presents to the ED complaining of bilateral leg edema, L>R, with LLE weeping. She reports the swelling started last week, but the weeping started today. She missed dialysis because it is her birthday.Yesterday afternoon, she began experiencing moderately severe right scapular pain for unclear reasons which is positional and not pleuritic and weeping from a shallow abrasion of the medial portion of the left calf.  Because of the persistent weeping, she came to the ER.  Patient reports chronic SOB which she feels is at baseline.  She has a history of bronchiectasis but only used prn albuterol for that. A POCUS Echo in the ER demonstrated grossly normal LV function.  Venous Doppler was negative for DVT. Patient was admitted to [facility] last week with a prolonged migraine which responded to morphine.

## 2024-04-21 NOTE — H&P ADULT - NSHPREVIEWOFSYSTEMS_GEN_ALL_CORE
Review of Systems:   CONSTITUTIONAL: No fever  EYES: No eye pain, visual disturbances  ENMT:  No sinus or throat pain  RESPIRATORY: No wheezing  CARDIOVASCULAR: No chest pain, palpitations, dizziness  GASTROINTESTINAL: No abdominal or epigastric pain. No nausea, vomiting, or hematemesis; No diarrhea or constipation.   GENITOURINARY: No dysuria, frequency, hematuria, or incontinence  NEUROLOGICAL: No loss of strength, numbness  SKIN: No rashes  MUSCULOSKELETAL: No muscle, back pain

## 2024-04-21 NOTE — H&P ADULT - NSHPPHYSICALEXAM_GEN_ALL_CORE
Vital Signs Last 24 Hrs  T(C): 36.6 (21 Apr 2024 13:05), Max: 36.9 (20 Apr 2024 22:43)  T(F): 97.9 (21 Apr 2024 13:05), Max: 98.4 (20 Apr 2024 22:43)  HR: 61 (21 Apr 2024 13:05) (58 - 70)  BP: 155/66 (21 Apr 2024 13:05) (153/63 - 180/77)  BP(mean): 93 (21 Apr 2024 06:15) (93 - 111)  RR: 18 (21 Apr 2024 13:05) (17 - 18)  SpO2: 98% (21 Apr 2024 13:05) (98% - 100%)    Parameters below as of 21 Apr 2024 13:05  Patient On (Oxygen Delivery Method): room air        CONSTITUTIONAL: Well-groomed, in no apparent distress  EYES: No conjunctival or scleral injection, non-icteric;   NECK: Trachea midline   RESPIRATORY: Breathing comfortably; lungs CTA without wheeze/rhonchi/rales  CARDIOVASCULAR: +S1S2,  no lower extremity edema  GASTROINTESTINAL: No tenderness, +BS throughout  SKIN: No rashes, linear well healed scar noted on sternum   NEUROLOGIC: sensation intact in LEs b/l to light touch  PSYCHIATRIC: A+O x 3

## 2024-04-21 NOTE — PATIENT PROFILE ADULT - FALL HARM RISK - HARM RISK INTERVENTIONS

## 2024-04-21 NOTE — H&P ADULT - ASSESSMENT
89 y/o F with PMH of ESRD on HD T/Th/Sat, last session on Thursday, HTN, s/p AVR, not on A/C, presents to the ED complaining of bilateral leg edema, L>R, with LLE weeping. She reports the swelling started last week, but the weeping started today. She missed dialysis because it is her birthday.Yesterday afternoon, she began experiencing moderately severe right scapular pain for unclear reasons which is positional and not pleuritic and weeping from a shallow abrasion of the medial portion of the left calf.  Because of the persistent weeping, she came to the ER.  Patient reports chronic SOB which she feels is at baseline.  She has a history of bronchiectasis but only used prn albuterol for that. A POCUS Echo in the ER demonstrated grossly normal LV function.  Venous Doppler was negative for DVT. Patient was admitted to [facility] last week with a prolonged migraine which responded to morphine.

## 2024-04-21 NOTE — H&P ADULT - PROBLEM SELECTOR PLAN 4
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 Discussed plan for HD per Nephro. All questions answered to satisfaction.

## 2024-04-21 NOTE — H&P ADULT - PROBLEM SELECTOR PLAN 1
Likely 2/2 missing HD on Saturday  ESRD with HD on T/Th/ Sa  Dr. Zacarias Pinon consulted to set up HD   Pro- BNP 29615 possibly in the setting of ESRD and missing HD, Tn 41, 41   Creat 3.58  Pt makes urine, received lasix 40mg x1 in the ED  Imaging showing mild hydro- no  symptoms   LE Doppler negative for DVT

## 2024-04-21 NOTE — H&P ADULT - NSICDXPASTMEDICALHX_GEN_ALL_CORE_FT
PAST MEDICAL HISTORY:  Bronchiolectasis     Chronic atrial fibrillation     ESRD (end stage renal disease)     ESRD on hemodialysis     Migraine     Mitral valve prolapse     Nonrheumatic aortic (valve) stenosis     Palpitations     Scoliosis unspecified, unspecified site

## 2024-04-21 NOTE — H&P ADULT - NSHPLABSRESULTS_GEN_ALL_CORE
11.3   6.76  )-----------( 121      ( 20 Apr 2024 23:04 )             34.2       04-20    137  |  101  |  61<H>  ----------------------------<  104<H>  4.8   |  20<L>  |  3.58<H>    Ca    9.5      20 Apr 2024 23:04    TPro  6.9  /  Alb  3.9  /  TBili  0.6  /  DBili  x   /  AST  43<H>  /  ALT  40  /  AlkPhos  107  04-20      PT/INR - ( 20 Apr 2024 23:04 )   PT: 11.1 sec;   INR: 1.06 ratio         PTT - ( 20 Apr 2024 23:04 )  PTT:27.4 sec            Troponin T, High Sensitivity Result: 41 ng/L (04-21-24 @ 00:48)  Troponin T, High Sensitivity Result: 41 ng/L (04-20-24 @ 23:04)

## 2024-04-21 NOTE — H&P CARDIOLOGY - REVIEW OF SYSTEMS
Except as noted above...  Constitutional: The patient denied headache, fatigue, fever, sweats, loss of appetite or chills  Eyes: The patient denied double vision, eye pain, eye discharge, red eyes or itchy eyes  ENT: The patient denied ear pain, ear discharge, nasal congestion, nasal discharge, sore throat, enlarged tonsils, hoarseness, neck pain or neck swelling  Cardiovascular: The patient denied chest pain, chest discomfort, dizziness, palpitations, fainting, lower extremity swelling or leg cramps  Respiratory: The patient denied cough, coughing up blood, wheezing, chest congestion or mucous production  GI: The patient denied weight gain, weight loss, abdominal pain, nausea, vomiting, diarrhea, constipation, black stools or bloody stools  : The patient denied pain on urination, burning with urination, frequent urination or blood in the urine  Skin: The patient denied rashes, redness or swelling  Neurologic: The patient denied headache, stiff neck, weakness, numbness, difficulty speaking, unsteadiness or numbness/tingling in feet  Psychiatric: The patient denied hallucinations, agitation or disorientation  Endocrine: The patient denied excessive thirst, excessive urination, cold intolerance or heat intolerance  Hematologic: The patient denied easy bruisability or pallor  Allergic/Immunologic: The patient denied runny nose, recurrent infections, hives or pruritis  Musculoskeletal: The patient denied muscle weakness or muscle aches  Extremities: The patient denied  clubbing, cyanosis

## 2024-04-21 NOTE — H&P CARDIOLOGY - HISTORY OF PRESENT ILLNESS
As per ER note:   89 year old female with hx of ESRD on HD T/Th/Sat, last session on Thursday, HTN, s/p AVR, no on A/C, presents to the ED complaining of bilateral LE, L>R, with LLE weeping. She reports the swelling started last week, but the weeping started today. She missed dialysis today because it is her birthday.Yesterday afternoon, she began experiencing moderately sever right scapular pain for unclear reasons which is positional and not pleuritic and weeping from a shallow abrasion of the medial portion of the left calf.  Because of the persistent weeping, she came to the ER.  THe patient does have chronic SOB which she feels is at baseline.  She has a history of bronchiectasis but only used prn albuterol for that. A POCUS Echo in the ER demonstrated grossly normal LV function.  Venous DOppler was negative for DVT. Patient was admitted to [facility] last week with a prolonged migraine which responded to morphine.    Except as noted above...  Constitutional: The patient denied fatigue, fever, sweats, loss of appetite or chills  Eyes: The patient denied double vision, eye pain, eye discharge, red eyes or itchy eyes  ENT: The patient denied ear pain, ear discharge, nasal congestion, nasal discharge, sore throat, enlarged tonsils, hoarseness, neck pain or neck swelling  Cardiovascular: The patient denied chest pain, chest discomfort, dizziness, palpitations, fainting, lower extremity swelling or leg cramps  Respiratory: The patient denied  cough, coughing up blood, wheezing, chest congestion or mucous production  GI: The patient denied weight gain, weight loss, abdominal pain, nausea, vomiting, diarrhea, constipation, black stools or bloody stools  : The patient denied pain on urination, burning with urination, frequent urination or blood in the urine  Skin: The patient denied rashes, redness or swelling  Neurologic: The patient denied headache, stiff neck, weakness, numbness, difficulty speaking, unsteadiness or numbness/tingling in feet  Psychiatric: The patient denied hallucinations, agitation or disorientation  Endocrine: The patient denied excessive thirst, excessive urination, cold intolerance or heat intolerance  Hematologic: The patient denied easy bruisability or pallor  Allergic/Immunologic: The patient denied runny nose, recurrent infections, hives or pruritis  Musculoskeletal: The patient denied arthritic pains, muscle weakness or muscle aches  Extremities: The patient denied  clubbing, cyanosis     Vital Signs Last 24 Hrs  T(C): 36.6 (21 Apr 2024 06:15), Max: 36.9 (20 Apr 2024 22:43)  T(F): 97.9 (21 Apr 2024 06:15), Max: 98.4 (20 Apr 2024 22:43)  HR: 58 (21 Apr 2024 06:15) (58 - 70)  BP: 153/63 (21 Apr 2024 06:15) (153/63 - 180/77)  BP(mean): 93 (21 Apr 2024 06:15) (93 - 111)  RR: 17 (21 Apr 2024 06:15) (17 - 18)  SpO2: 98% (21 Apr 2024 06:15) (98% - 100%)    Parameters below as of 21 Apr 2024 06:15  Patient On (Oxygen Delivery Method): room air      Daily Height in cm: 157.48 (20 Apr 2024 22:09)    Daily   I&O's Summary      Neck: no bruits, JVD, adenopathy  Chest: clear  Cor: [id number], IRIR, normal S1S2 with III/VI SACHI@ LLSB no rght  Abd: soft, nontender, BS+ and no HSM  Ext: w/o CC 1-2+ LLE edema with weeping form shallow abrasion of medial calf  Pulses:2+->dp bilaterally    MEDICATIONS  (STANDING):  oxyCODONE    IR 2.5 milliGRAM(s) Oral once  Metoprolol  Amlodipine    MEDICATIONS  (PRN):      04-20    137  |  101  |  61<H>  ----------------------------<  104<H>  4.8   |  20<L>  |  3.58<H>    Ca    9.5      20 Apr 2024 23:04    TPro  6.9  /  Alb  3.9  /  TBili  0.6  /  DBili  x   /  AST  43<H>  /  ALT  40  /  AlkPhos  107  04-20                          11.3   6.76  )-----------( 121      ( 20 Apr 2024 23:04 )             34.2     PT/INR - ( 20 Apr 2024 23:04 )   PT: 11.1 sec;   INR: 1.06 ratio         PTT - ( 20 Apr 2024 23:04 )  PTT:27.4 sec  Urinalysis Basic - ( 20 Apr 2024 23:04 )    Color: x / Appearance: x / SG: x / pH: x  Gluc: 104 mg/dL / Ketone: x  / Bili: x / Urobili: x   Blood: x / Protein: x / Nitrite: x   Leuk Esterase: x / RBC: x / WBC x   Sq Epi: x / Non Sq Epi: x / Bacteria: x      HEALTH ISSUES - PROBLEM Dx:    ESRD-Current symptoms (except for back pain of unclear etiology) seem fairly mild.  No objection ot HD today to remove escess fluid and minimize weeping (although conservative management of abrasion would probably be equally effective).  Chronic stable SOB-CT shows fairly extensive bronchiectasis. Would review with Dr. Anderson the PFT's to assess etiology of SOB.  Doesn't appear to be cardiac  S/P AVR  H/o AF(on no A/C) -EKG to be ordered.  Would plan for discharge after HD if back pain can be controlled.

## 2024-04-22 LAB
ANION GAP SERPL CALC-SCNC: 18 MMOL/L — HIGH (ref 5–17)
BUN SERPL-MCNC: 72 MG/DL — HIGH (ref 7–23)
CALCIUM SERPL-MCNC: 9.3 MG/DL — SIGNIFICANT CHANGE UP (ref 8.4–10.5)
CHLORIDE SERPL-SCNC: 102 MMOL/L — SIGNIFICANT CHANGE UP (ref 96–108)
CO2 SERPL-SCNC: 18 MMOL/L — LOW (ref 22–31)
CREAT SERPL-MCNC: 4.07 MG/DL — HIGH (ref 0.5–1.3)
EGFR: 10 ML/MIN/1.73M2 — LOW
GLUCOSE SERPL-MCNC: 132 MG/DL — HIGH (ref 70–99)
HCT VFR BLD CALC: 32.7 % — LOW (ref 34.5–45)
HGB BLD-MCNC: 10.6 G/DL — LOW (ref 11.5–15.5)
MAGNESIUM SERPL-MCNC: 2.2 MG/DL — SIGNIFICANT CHANGE UP (ref 1.6–2.6)
MCHC RBC-ENTMCNC: 32.4 GM/DL — SIGNIFICANT CHANGE UP (ref 32–36)
MCHC RBC-ENTMCNC: 33.4 PG — SIGNIFICANT CHANGE UP (ref 27–34)
MCV RBC AUTO: 103.2 FL — HIGH (ref 80–100)
NRBC # BLD: 0 /100 WBCS — SIGNIFICANT CHANGE UP (ref 0–0)
PHOSPHATE SERPL-MCNC: 4.4 MG/DL — SIGNIFICANT CHANGE UP (ref 2.5–4.5)
PLATELET # BLD AUTO: 112 K/UL — LOW (ref 150–400)
POTASSIUM SERPL-MCNC: 4.5 MMOL/L — SIGNIFICANT CHANGE UP (ref 3.5–5.3)
POTASSIUM SERPL-SCNC: 4.5 MMOL/L — SIGNIFICANT CHANGE UP (ref 3.5–5.3)
RBC # BLD: 3.17 M/UL — LOW (ref 3.8–5.2)
RBC # FLD: 13.7 % — SIGNIFICANT CHANGE UP (ref 10.3–14.5)
SODIUM SERPL-SCNC: 138 MMOL/L — SIGNIFICANT CHANGE UP (ref 135–145)
WBC # BLD: 5.48 K/UL — SIGNIFICANT CHANGE UP (ref 3.8–10.5)
WBC # FLD AUTO: 5.48 K/UL — SIGNIFICANT CHANGE UP (ref 3.8–10.5)

## 2024-04-22 RX ORDER — LACTOBACILLUS ACIDOPHILUS 100MM CELL
1 CAPSULE ORAL
Qty: 0 | Refills: 0 | DISCHARGE
Start: 2024-04-22

## 2024-04-22 RX ORDER — AMLODIPINE BESYLATE 2.5 MG/1
1 TABLET ORAL
Qty: 0 | Refills: 0 | DISCHARGE
Start: 2024-04-22

## 2024-04-22 RX ORDER — ACETAMINOPHEN 500 MG
750 TABLET ORAL ONCE
Refills: 0 | Status: DISCONTINUED | OUTPATIENT
Start: 2024-04-22 | End: 2024-04-22

## 2024-04-22 RX ORDER — MORPHINE SULFATE 50 MG/1
2 CAPSULE, EXTENDED RELEASE ORAL ONCE
Refills: 0 | Status: DISCONTINUED | OUTPATIENT
Start: 2024-04-22 | End: 2024-04-22

## 2024-04-22 RX ORDER — AMLODIPINE BESYLATE 2.5 MG/1
2.5 TABLET ORAL DAILY
Refills: 0 | Status: DISCONTINUED | OUTPATIENT
Start: 2024-04-22 | End: 2024-04-23

## 2024-04-22 RX ORDER — ACETAMINOPHEN 500 MG
1000 TABLET ORAL ONCE
Refills: 0 | Status: COMPLETED | OUTPATIENT
Start: 2024-04-22 | End: 2024-04-22

## 2024-04-22 RX ORDER — METOPROLOL TARTRATE 50 MG
2 TABLET ORAL
Qty: 0 | Refills: 0 | DISCHARGE

## 2024-04-22 RX ORDER — SUMATRIPTAN SUCCINATE 4 MG/.5ML
50 INJECTION, SOLUTION SUBCUTANEOUS ONCE
Refills: 0 | Status: COMPLETED | OUTPATIENT
Start: 2024-04-22 | End: 2024-04-22

## 2024-04-22 RX ADMIN — MORPHINE SULFATE 2 MILLIGRAM(S): 50 CAPSULE, EXTENDED RELEASE ORAL at 10:58

## 2024-04-22 RX ADMIN — Medication 50 MILLIGRAM(S): at 12:53

## 2024-04-22 RX ADMIN — AMLODIPINE BESYLATE 2.5 MILLIGRAM(S): 2.5 TABLET ORAL at 08:09

## 2024-04-22 RX ADMIN — SUMATRIPTAN SUCCINATE 50 MILLIGRAM(S): 4 INJECTION, SOLUTION SUBCUTANEOUS at 18:15

## 2024-04-22 RX ADMIN — Medication 1 TABLET(S): at 12:52

## 2024-04-22 RX ADMIN — Medication 400 MILLIGRAM(S): at 08:09

## 2024-04-22 RX ADMIN — Medication 25 MILLIGRAM(S): at 21:05

## 2024-04-22 NOTE — DISCHARGE NOTE PROVIDER - PROVIDER TOKENS
Dilator from sheath used to switch wires to amplatz PROVIDER:[TOKEN:[[id number]],FOLLOWUP:[1 week],ESTABLISHEDPATIENT:[T]],PROVIDER:[TOKEN:[[id number]],FOLLOWUP:[Routine],ESTABLISHEDPATIENT:[T]]

## 2024-04-22 NOTE — PHYSICAL THERAPY INITIAL EVALUATION ADULT - PERTINENT HX OF CURRENT PROBLEM, REHAB EVAL
89 y/o F with PMH of ESRD on HD T/Th/Sat, last session on Thursday, HTN, s/p AVR, not on A/C, presents to the ED complaining of bilateral leg edema, L>R, with LLE weeping. She reports the swelling started last week, but the weeping started today. She missed dialysis because it is her birthday.Yesterday afternoon, she began experiencing moderately severe right scapular pain for unclear reasons which is positional and not pleuritic and weeping from a shallow abrasion of the medial portion of the left calf.  Because of the persistent weeping, she came to the ER.  Patient reports chronic SOB which she feels is at baseline.  She has a history of bronchiectasis but only used prn albuterol for that. A POCUS Echo in the ER demonstrated grossly normal LV function.  Venous Doppler was negative for DVT. Patient was admitted to [facility] last week with a prolonged migraine which responded to morphine. Hosp course: XR chest (4/20) Cardiomegaly. VA duplex LLE (4/21) No evidence of left lower extremity deep venous thrombosis. POCUS TTE (4/21) No Pericardial Effusion. Aortic valve replacement. Mitral and tricuspid valvular regurgitation. L lung trace pulmonary effusion, scattered L lung basilar B-Lines. CT chest/abdomen/pelvis (4/21) Scattered bilateral tree-in-bud opacities, pulmonary nodules, and bronchiectasis, progressed since 2021. Mild right hydronephrosis without evidence for obstructing stone. Slight interval progression of pancreatic cystic lesions.

## 2024-04-22 NOTE — CHART NOTE - NSCHARTNOTEFT_GEN_A_CORE
Patient c/o severe migraine headache today, not improved by IV or PO acetaminophen. Patient seen at bedside with Dr. Perez. Pain rated as 8/10 overlying calvarium. Initially recommended for PO tylenol and caffeinated beverages for migraine. Headache persisted as patient went for hemodialysis, so IV Morphine 2mg given per attending, initially ineffective but now pain level is starting to improve to 6/10. Patient's outpatient headache neurologist Dr. Gildardo Alegria's office ([phone]) contacted for collateral. Per Dr. Alegria's office, patient has active prescription for Sumatriptan 50mg Q2hrs PRN for migraine, MDD 250mg.    D/w Dr. Perez    #Migraine Headache  -Sumatriptan 50mg ordered for migraine headache, re-dose as needed  -Discharge planning tomorrow pending home PT setup and resumption of outpatient dialysis for Thursday      Piter Alvarez PA-C  [facility] Department of Medicine  Reachable on MS Teams

## 2024-04-22 NOTE — DISCHARGE NOTE PROVIDER - NSDCCPCAREPLAN_GEN_ALL_CORE_FT
PRINCIPAL DISCHARGE DIAGNOSIS  Diagnosis: Fluid overload  Assessment and Plan of Treatment: You received a dose of IV lasix in the Emergency Department, and then you had volume removal with dialysis prior to discharge on 04/22/24. You can resume your outpatient hemodialysis schedule on Thursday, and follow up with your nephrologist Dr. Pinon 1 week after discharge.      SECONDARY DISCHARGE DIAGNOSES  Diagnosis: HTN (hypertension)  Assessment and Plan of Treatment: BP elevated with dialysis. Your home amlodipine was resumed by Dr. Meza. Continue your home metoprolol doses and continue Amlodipine as prescribed. Folow up with your nephrologist Dr. Pinon 1 week after discharge. You are scheduled for a follow up with PCP Dr. Perez in July, please call his office to make an appointment if you wish to be seen sooner.     PRINCIPAL DISCHARGE DIAGNOSIS  Diagnosis: Fluid overload  Assessment and Plan of Treatment: You received a dose of IV lasix in the Emergency Department, and then you had volume removal with dialysis prior to discharge on 04/22/24. You can resume your outpatient hemodialysis schedule on Thursday, and follow up with your nephrologist Dr. Pinon 1 week after discharge.      SECONDARY DISCHARGE DIAGNOSES  Diagnosis: HTN (hypertension)  Assessment and Plan of Treatment: BP elevated with dialysis. Your home amlodipine was resumed by Dr. Meza. Continue your home metoprolol doses and continue Amlodipine as prescribed. Folow up with your nephrologist Dr. Pinon 1 week after discharge. You are scheduled for a follow up with PCP Dr. Perez in July, please call his office to make an appointment if you wish to be seen sooner.  Continue to follow a low salt/sodium diet.  Perform physical activities as tolerated in consultation with your Primary Care Provider and physical therapist.  Take all medications as prescribed.  Follow up with your medical doctor for routine blood pressure monitoring at your next visit.  Notify your doctor if you have any of the following symptoms:  Dizziness, lightheadedness, blurry vision, headache, chest pain, or shortness of breath.     PRINCIPAL DISCHARGE DIAGNOSIS  Diagnosis: Fluid overload  Assessment and Plan of Treatment: You received a dose of IV lasix in the Emergency Department, and then you had volume removal with dialysis prior to discharge on 04/23/24. You can resume your outpatient hemodialysis schedule on Thursday 4/25/24, and follow up with your nephrologist Dr. Pinon 1 week after discharge.      SECONDARY DISCHARGE DIAGNOSES  Diagnosis: HTN (hypertension)  Assessment and Plan of Treatment: BP elevated with dialysis. Your home amlodipine was resumed by Dr. Meza. Continue your home metoprolol doses and continue Amlodipine as prescribed. Folow up with your nephrologist Dr. Pinon 1 week after discharge. You are scheduled for a follow up with PCP Dr. Perez in July, please call his office to make an appointment if you wish to be seen sooner.  Continue to follow a low salt/sodium diet.  Perform physical activities as tolerated in consultation with your Primary Care Provider and physical therapist.  Take all medications as prescribed.  Follow up with your medical doctor for routine blood pressure monitoring at your next visit.  Notify your doctor if you have any of the following symptoms:  Dizziness, lightheadedness, blurry vision, headache, chest pain, or shortness of breath.

## 2024-04-22 NOTE — PHYSICAL THERAPY INITIAL EVALUATION ADULT - ADDITIONAL COMMENTS
Pt resides alone in an apartment with no stairs. PTA, pt was independent with functional mobility/ADLs. Ambulates with straight cane at baseline. Pt owns RW.

## 2024-04-22 NOTE — DISCHARGE NOTE PROVIDER - NSDCFUSCHEDAPPT_GEN_ALL_CORE_FT
Gildardo AlegriaFormerly [facility]  PAINM[employer] [address]  Scheduled Appointment: 05/16/2024

## 2024-04-22 NOTE — DISCHARGE NOTE PROVIDER - NSDCCAREPROVSEEN_GEN_ALL_CORE_FT
Chirs, Tucker Pinon, Zacarias
" I was walking outside when I tripped on the sidewalk ( uneven sidewalk ) and fell and hit my right side face and right forearm " Pt denies LOC but pt presented with pain right side of head , face, jaw and forearm/hand  Pt denies taking blood thinners/PAIN

## 2024-04-22 NOTE — DISCHARGE NOTE PROVIDER - NSDCMRMEDTOKEN_GEN_ALL_CORE_FT
amLODIPine 2.5 mg oral tablet: 1 tab(s) orally once a day  Metoprolol Tartrate 25 mg oral tablet: 2 tab(s) orally in the morning, 1 tablet orally in the evening  Probiotic capsule: 1 capsule orally once a day  Vitamin C 500mg tablet: 1 tablet orally once a day   amLODIPine 2.5 mg oral tablet: 1 tab(s) orally once a day  lactobacillus acidophilus oral capsule: 1 cap(s) orally once a day  Metoprolol Tartrate 25 mg oral tablet: 2 tab(s) orally once a day in the morning, 1 tablet orally in the evening  Vitamin C 500mg tablet: 1 tablet orally once a day

## 2024-04-22 NOTE — PHYSICAL THERAPY INITIAL EVALUATION ADULT - STANDING BALANCE: STATIC
Detail Level: Detailed
Detail Level: Generalized
Detail Level: Zone
Detail Level: Simple
with cane/fair balance

## 2024-04-22 NOTE — DISCHARGE NOTE PROVIDER - HOSPITAL COURSE
HPI:  89 y/o F with PMH of ESRD on HD T/Th/Sat, last session on Thursday, HTN, s/p AVR, not on A/C, presents to the ED complaining of bilateral leg edema, L>R, with LLE weeping. She reports the swelling started last week, but the weeping started today. She missed dialysis because it is her birthday.Yesterday afternoon, she began experiencing moderately severe right scapular pain for unclear reasons which is positional and not pleuritic and weeping from a shallow abrasion of the medial portion of the left calf.  Because of the persistent weeping, she came to the ER.  Patient reports chronic SOB which she feels is at baseline.  She has a history of bronchiectasis but only used prn albuterol for that. A POCUS Echo in the ER demonstrated grossly normal LV function.  Venous Doppler was negative for DVT. Patient was admitted to [facility] last week with a prolonged migraine which responded to morphine.   (21 Apr 2024 16:05)    Hospital Course:  CXR showed cardiomegaly. CT Chest/abd/pelvis showed: Scattered bilateral tree-in-bud opacities, pulmonary nodules, and bronchiectasis, progressed since 2021.  Mild right hydronephrosis without evidence for obstructing stone. Slight interval progression of pancreatic cystic lesions. VA Duplex LLE vein scan negative for DVT. Cardiac enzymes within normal limits. Patient complained of back pain starting a few days prior to admission, treated with IV tylenol, lidocaine patch; oxy 2.5 mg IR PO x1 for severe pain. ProBNP 16452 on admission. Cardiology/PMD was consulted for volume overload and HTN. Nephrology was consulted for ESRD on HD. Patient recieved IV Lasix 40mg x 1 dose in the ED, then had volume removal with hemodialysis on 04/22/24. Discharge plan with medication reconciliation discussed with attending Dr. Perez. Patient is medically cleared for discharge home with outpatient Nephrology+PMD/cardiology follow ups, resumption of outpatient HD on thursday.    Important/Active Issues For Follow-Up:  ESRD on HD/volume overload- nephrology follow up, outpatient HD  HTN/Volume Overload- cardiology/PCP follow up    Medication Changes and Reason:  Amlodipine resumed for HTN (BP readings above goal)    Advance Directives:  [x] Full Code [ ] Hospice [ ] DNR    Discharge Diagnoses:  Volume overload  HTN HPI:  89 y/o F with PMH of ESRD on HD T/Th/Sat, last session on Thursday, HTN, s/p AVR, not on A/C, presents to the ED complaining of bilateral leg edema, L>R, with LLE weeping. She reports the swelling started last week, but the weeping started today. She missed dialysis because it is her birthday.Yesterday afternoon, she began experiencing moderately severe right scapular pain for unclear reasons which is positional and not pleuritic and weeping from a shallow abrasion of the medial portion of the left calf.  Because of the persistent weeping, she came to the ER.  Patient reports chronic SOB which she feels is at baseline.  She has a history of bronchiectasis but only used prn albuterol for that. A POCUS Echo in the ER demonstrated grossly normal LV function.  Venous Doppler was negative for DVT. Patient was admitted to [facility] last week with a prolonged migraine which responded to morphine.   (21 Apr 2024 16:05)    Hospital Course:  CXR showed cardiomegaly. CT Chest/abd/pelvis showed: Scattered bilateral tree-in-bud opacities, pulmonary nodules, and bronchiectasis, progressed since 2021.  Mild right hydronephrosis without evidence for obstructing stone. Slight interval progression of pancreatic cystic lesions. VA Duplex LLE vein scan negative for DVT. Cardiac enzymes within normal limits. Patient complained of back pain starting a few days prior to admission, treated with IV tylenol, lidocaine patch; oxy 2.5 mg IR PO x1 for severe pain. ProBNP 09266 on admission. Cardiology/PMD was consulted for volume overload and HTN. Nephrology was consulted for ESRD on HD. Patient recieved IV Lasix 40mg x 1 dose in the ED, then had volume removal with hemodialysis on 04/22/24. Discharge plan with medication reconciliation discussed with attending Dr. Perez. Patient is medically cleared for discharge home with outpatient Nephrology+PMD/cardiology follow ups, resumption of outpatient HD on thursday.    Important/Active Issues For Follow-Up:  ESRD on HD/volume overload- nephrology follow up, outpatient HD  HTN/Volume Overload- cardiology/PCP follow up    Medication Changes and Reason:  Amlodipine resumed for HTN (BP readings above goal)    Advance Directives:  [x] Full Code [ ] Hospice [ ] DNR    Discharge Diagnoses:  Volume overload  HTN  Migraine

## 2024-04-22 NOTE — DISCHARGE NOTE PROVIDER - CARE PROVIDER_API CALL
Zacarias Pinon  Nephrology  [address], [address]  [place] [address]  Phone: [phone]  Fax: [phone]  Established Patient  Follow Up Time: 1 week    Tucker Perez  Cardiovascular Disease  [facility], [address]  [place] [address]  Phone: [phone]  Fax: [phone]  Established Patient  Follow Up Time: Routine

## 2024-04-22 NOTE — DISCHARGE NOTE PROVIDER - NSDCFUADDAPPT_GEN_ALL_CORE_FT
APPTS ARE READY TO BE MADE: [ x] YES    Best Family or Patient Contact (if needed):    Additional Information about above appointments (if needed):    1: Follow up with your Nephrologist Dr. Pinon 1 week after discharge  2: You are scheduled for a follow up with your PCP Dr. Perez in July. You can call to make an appointment sooner if you wish to be seen.  3:     Other comments or requests:    APPTS ARE READY TO BE MADE: [ x] YES    Best Family or Patient Contact (if needed):    Additional Information about above appointments (if needed):    1: Follow up with your Nephrologist Dr. Pinon 1 week after discharge  2: You are scheduled for a follow up with your PCP Dr. Perez in July. You can call to make an appointment sooner if you wish to be seen.  3:     Other comments or requests:       Met with patient face to face and provided the patient with provider referral information, however patient prefers to schedule the appointments on their own.

## 2024-04-23 ENCOUNTER — TRANSCRIPTION ENCOUNTER (OUTPATIENT)
Age: 89
End: 2024-04-23

## 2024-04-23 VITALS
HEART RATE: 61 BPM | DIASTOLIC BLOOD PRESSURE: 64 MMHG | SYSTOLIC BLOOD PRESSURE: 156 MMHG | TEMPERATURE: 98 F | RESPIRATION RATE: 18 BRPM | OXYGEN SATURATION: 97 %

## 2024-04-23 PROCEDURE — 74176 CT ABD & PELVIS W/O CONTRAST: CPT | Mod: MC

## 2024-04-23 PROCEDURE — 93971 EXTREMITY STUDY: CPT

## 2024-04-23 PROCEDURE — 85027 COMPLETE CBC AUTOMATED: CPT

## 2024-04-23 PROCEDURE — 85610 PROTHROMBIN TIME: CPT

## 2024-04-23 PROCEDURE — 85730 THROMBOPLASTIN TIME PARTIAL: CPT

## 2024-04-23 PROCEDURE — 85025 COMPLETE CBC W/AUTO DIFF WBC: CPT

## 2024-04-23 PROCEDURE — 99285 EMERGENCY DEPT VISIT HI MDM: CPT | Mod: 25

## 2024-04-23 PROCEDURE — 97161 PT EVAL LOW COMPLEX 20 MIN: CPT

## 2024-04-23 PROCEDURE — 80053 COMPREHEN METABOLIC PANEL: CPT

## 2024-04-23 PROCEDURE — 36415 COLL VENOUS BLD VENIPUNCTURE: CPT

## 2024-04-23 PROCEDURE — 71250 CT THORAX DX C-: CPT | Mod: MC

## 2024-04-23 PROCEDURE — 84100 ASSAY OF PHOSPHORUS: CPT

## 2024-04-23 PROCEDURE — 84484 ASSAY OF TROPONIN QUANT: CPT

## 2024-04-23 PROCEDURE — 93005 ELECTROCARDIOGRAM TRACING: CPT

## 2024-04-23 PROCEDURE — 71046 X-RAY EXAM CHEST 2 VIEWS: CPT

## 2024-04-23 PROCEDURE — 93308 TTE F-UP OR LMTD: CPT

## 2024-04-23 PROCEDURE — 96375 TX/PRO/DX INJ NEW DRUG ADDON: CPT

## 2024-04-23 PROCEDURE — 99261: CPT

## 2024-04-23 PROCEDURE — 83735 ASSAY OF MAGNESIUM: CPT

## 2024-04-23 PROCEDURE — 83880 ASSAY OF NATRIURETIC PEPTIDE: CPT

## 2024-04-23 PROCEDURE — 96374 THER/PROPH/DIAG INJ IV PUSH: CPT

## 2024-04-23 PROCEDURE — 80048 BASIC METABOLIC PNL TOTAL CA: CPT

## 2024-04-23 RX ORDER — AMLODIPINE BESYLATE 2.5 MG/1
1 TABLET ORAL
Qty: 30 | Refills: 0
Start: 2024-04-23 | End: 2024-05-22

## 2024-04-23 RX ADMIN — Medication 50 MILLIGRAM(S): at 05:39

## 2024-04-23 RX ADMIN — AMLODIPINE BESYLATE 2.5 MILLIGRAM(S): 2.5 TABLET ORAL at 05:57

## 2024-04-23 RX ADMIN — Medication 1 TABLET(S): at 11:13

## 2024-04-23 NOTE — DISCHARGE NOTE NURSING/CASE MANAGEMENT/SOCIAL WORK - NSDCPEFALRISK_GEN_ALL_CORE
For information on Fall & Injury Prevention, visit: [email or web address] OR  [email or web address] OR  https://www.cdc.gov/steadi/patient.html

## 2024-04-23 NOTE — PROGRESS NOTE ADULT - SUBJECTIVE AND OBJECTIVE BOX
Patient seen this AM.  Headache resolved gradually within a few hours of taking Imitrex po last night.    BP 130s-160s.    No c/o dyspnea or CP.        REVIEW OF SYSTEMS:  CARDIOVASCULAR: No chest pain, dyspnea or palpitations  All other review of systems is negative unless indicated above    Medications:  acetaminophen     Tablet .. 650 milliGRAM(s) Oral every 6 hours PRN  aluminum hydroxide/magnesium hydroxide/simethicone Suspension 30 milliLiter(s) Oral every 4 hours PRN  amLODIPine   Tablet 2.5 milliGRAM(s) Oral daily  lactobacillus acidophilus 1 Tablet(s) Oral daily  melatonin 3 milliGRAM(s) Oral at bedtime PRN  metoprolol tartrate 50 milliGRAM(s) Oral daily  metoprolol tartrate 25 milliGRAM(s) Oral at bedtime  ondansetron Injectable 4 milliGRAM(s) IV Push every 8 hours PRN      Physical Exam:  Vitals:  T(C): 36.4 (24 @ 15:39), Max: 36.8 (24 @ 19:54)  HR: 61 (24 @ 15:39) (54 - 62)  BP: 156/64 (24 @ 15:39) (119/75 - 167/70)  BP(mean): --  RR: 18 (24 @ 15:39) (18 - 18)  SpO2: 97% (24 @ 15:39) (95% - 98%)  Wt(kg): --  Daily     Daily Weight in k (2024 14:49)  I&O's Summary    2024 07:  -  2024 07:00  --------------------------------------------------------  IN: 480 mL / OUT: 0 mL / NET: 480 mL    2024 07:  -  2024 16:05  --------------------------------------------------------  IN: 700 mL / OUT: 1700 mL / NET: -1000 mL        Appearance:  Normal, NAD  Eyes:  EOMI  HEENT: Normal oral mucosa NC/AT  Neck:  No JVD or HJR  Respiratory: Clear to auscultation bilaterally  Cardiovascular: Normal S1 and S2 with 1-2/6 SACHI base and LSB.  No rubs or gallops  Abdomen:   BS normal, Soft,  Non-tender without organomegaly or masses  Extremities: Trivial L leg edema.  None on R                Complete Blood Count (24 @ 09:52)   Nucleated RBC: 0 /100 WBCs  WBC Count: 5.48 K/uL  RBC Count: 3.17 M/uL  Hemoglobin: 10.6 g/dL  Hematocrit: 32.7 %  Mean Cell Volume: 103.2 fl  Mean Cell Hemoglobin: 33.4 pg  Mean Cell Hemoglobin Conc: 32.4 gm/dL  Red Cell Distrib Width: 13.7 %  Platelet Count - Automated: 112 K/uL    138  |  102  |  72<H>  ----------------------------<  132<H>  4.5   |  18<L>  |  4.07<H>    Ca    9.3      2024 09:52  Phos  4.4       Mg     2.2               
[facility]  [phone]  NEPHROLOGY      INITIAL CONSULT NOTE  --------------------------------------------------------------------------------  HPI:    89 y/o F with PMH of ESRD on HD T/Th/Sat, last session on Thursday, HTN, s/p AVR, not on A/C, presents to the ED complaining of bilateral leg edema, L>R, with LLE weeping. She reports the swelling started last week, but the weeping started today. She missed dialysis because it is her birthday.Yesterday afternoon, she began experiencing moderately severe right scapular pain for unclear reasons which is positional and not pleuritic and weeping from a shallow abrasion of the medial portion of the left calf.  Because of the persistent weeping, she came to the ER.  Patient reports chronic SOB which she feels is at baseline.  She has a history of bronchiectasis but only used prn albuterol for that. A POCUS Echo in the ER demonstrated grossly normal LV function.  Venous Doppler was negative for DVT. Patient was admitted to [facility] last week with a prolonged migraine which responded to morphine.     last hd 3 days ago. renal consult called.      PAST HISTORY  --------------------------------------------------------------------------------  PAST MEDICAL & SURGICAL HISTORY:  Bronchiolectasis      Migraine      Mitral valve prolapse      Palpitations      Nonrheumatic aortic (valve) stenosis      Scoliosis unspecified, unspecified site      ESRD on hemodialysis      Chronic atrial fibrillation      ESRD (end stage renal disease)      S/P rotator cuff repair  left      Cataract extraction status      H/O colonoscopy        FAMILY HISTORY:  FH: CAD (coronary artery disease) (Father)    FH: MI (myocardial infarction) (Father)      PAST SOCIAL HISTORY:    ALLERGIES & MEDICATIONS  --------------------------------------------------------------------------------  Allergies    No Known Allergies    Intolerances    epinephrine (Other)    Standing Inpatient Medications  lactobacillus acidophilus 1 Tablet(s) Oral daily  metoprolol tartrate 25 milliGRAM(s) Oral at bedtime  metoprolol tartrate 50 milliGRAM(s) Oral daily    PRN Inpatient Medications  acetaminophen     Tablet .. 650 milliGRAM(s) Oral every 6 hours PRN  aluminum hydroxide/magnesium hydroxide/simethicone Suspension 30 milliLiter(s) Oral every 4 hours PRN  melatonin 3 milliGRAM(s) Oral at bedtime PRN  ondansetron Injectable 4 milliGRAM(s) IV Push every 8 hours PRN      REVIEW OF SYSTEMS  --------------------------------------------------------------------------------  Gen: No  fevers/chills   Skin: No rashes  Head/Eyes/Ears/Mouth: No headache; Normal hearing;  No sinus pain/discomfort, sore throat  Respiratory:  + SOB no cough, wheezing,  CV: No chest pain, orthopnea  GI: No abdominal pain, diarrhea, nausea, vomiting, melena  : No dysuria, decrease urination or hesitancy urinating  hematuria, nocturia  MSK: No joint pain/swelling; no back pain  Neuro: No dizziness/lightheadedness   + also with edema       VITALS/PHYSICAL EXAM  --------------------------------------------------------------------------------  T(C): 36.5 (04-21-24 @ 21:24), Max: 36.8 (04-21-24 @ 19:10)  HR: 60 (04-21-24 @ 22:38) (58 - 75)  BP: 171/69 (04-21-24 @ 22:38) (153/63 - 180/77)  RR: 18 (04-21-24 @ 21:24) (17 - 18)  SpO2: 97% (04-21-24 @ 21:24) (97% - 100%)  Wt(kg): --  Height (cm): 157.5 (04-20-24 @ 22:09)  Weight (kg): 50.8 (04-20-24 @ 22:09)  BMI (kg/m2): 20.5 (04-20-24 @ 22:09)  BSA (m2): 1.49 (04-20-24 @ 22:09)      Physical Exam:  	Gen: Non toxic comfortable appearing   	no jvd   	Pulm: decrease bs  no rales or ronchi or wheezing  	CV: RRR, S1S2; no rub  	Abd: +BS, soft, nontender/nondistended  	: No suprapubic tenderness  	UE: Warm, no cyanosis  no clubbing,  no edema; no asterixis  	LE: Warm, no cyanosis  no clubbing, 1+  edema  	Neuro: alert and oriented. speech coherent   	Skin: Warm, no decrease skin turgor   	Vascular access:  + avf + bruit and thrill     LABS/STUDIES  --------------------------------------------------------------------------------              11.3   6.45  >-----------<  114      [04-21-24 @ 15:52]              34.0     137  |  102  |  72  ----------------------------<  110      [04-21-24 @ 15:52]  4.8   |  19  |  4.00        Ca     9.2     [04-21-24 @ 15:52]    TPro  6.6  /  Alb  3.8  /  TBili  0.7  /  DBili  x   /  AST  41  /  ALT  45  /  AlkPhos  115  [04-21-24 @ 15:52]    PT/INR: PT 11.1 , INR 1.06       [04-20-24 @ 23:04]  PTT: 27.4       [04-20-24 @ 23:04]      Creatinine Trend:  SCr 4.00 [04-21 @ 15:52]  SCr 3.58 [04-20 @ 23:04]          PTH -- (Ca 9.7)      [06-03-23 @ 07:00]   276    HBsAb <3.0      [06-02-23 @ 21:57]  HBsAg Nonreact      [06-02-23 @ 21:57]  HCV 0.12, Nonreact      [06-02-23 @ 21:57]    
No c/o dyspnea lying in bed.  No CP.    Received lasix yesterday.    Edema only trivial now R leg.        REVIEW OF SYSTEMS:  CARDIOVASCULAR: No chest pain, dyspnea or palpitations  All other review of systems is negative unless indicated above    Medications:  acetaminophen     Tablet .. 650 milliGRAM(s) Oral every 6 hours PRN  aluminum hydroxide/magnesium hydroxide/simethicone Suspension 30 milliLiter(s) Oral every 4 hours PRN  amLODIPine   Tablet 2.5 milliGRAM(s) Oral daily  lactobacillus acidophilus 1 Tablet(s) Oral daily  melatonin 3 milliGRAM(s) Oral at bedtime PRN  metoprolol tartrate 50 milliGRAM(s) Oral daily  metoprolol tartrate 25 milliGRAM(s) Oral at bedtime  ondansetron Injectable 4 milliGRAM(s) IV Push every 8 hours PRN      Physical Exam:  Vitals:  T(C): 36.5 (04-22-24 @ 04:40), Max: 36.8 (04-21-24 @ 19:10)  HR: 60 (04-22-24 @ 04:40) (60 - 75)  BP: 152/67 (04-22-24 @ 04:40) (152/67 - 178/69)  BP(mean): --  RR: 19 (04-22-24 @ 04:40) (18 - 19)  SpO2: 95% (04-22-24 @ 04:40) (95% - 98%)  Wt(kg): --  Daily     Daily   I&O's Summary      Appearance:  Normal, NAD  Eyes:  EOMI  HEENT: Normal oral mucosa NC/AT  Neck:  No JVD or HJR  Respiratory: Clear to auscultation bilaterally  Cardiovascular: Normal S1 and S2 with 1-2/6 SACHI base and LSB.  No rubs or gallops  Abdomen:   BS normal, Soft,  Non-tender without organomegaly or masses  Extremities: Trivial L leg edema.  None on R          04-21    Complete Blood Count STAT (04.21.24 @ 15:52)   Nucleated RBC: 0 /100 WBCs  WBC Count: 6.45 K/uL  RBC Count: 3.27 M/uL  Hemoglobin: 11.3 g/dL  Hematocrit: 34.0 %  Mean Cell Volume: 104.0 fl  Mean Cell Hemoglobin: 34.6 pg  Mean Cell Hemoglobin Conc: 33.2 gm/dL  Red Cell Distrib Width: 13.5 %  Platelet Count - Automated: 114 K/uL    137  |  102  |  72<H>  ----------------------------<  110<H>  4.8   |  19<L>  |  4.00<H>    Ca    9.2      21 Apr 2024 15:52    TPro  6.6  /  Alb  3.8  /  TBili  0.7  /  DBili  x   /  AST  41<H>  /  ALT  45  /  AlkPhos  115  04-21    PT/INR - ( 20 Apr 2024 23:04 )   PT: 11.1 sec;   INR: 1.06 ratio         PTT - ( 20 Apr 2024 23:04 )  PTT:27.4 sec        
[facility]   [phone]  RENAL FOLLOW UP NOTE  --------------------------------------------------------------------------------  Chief Complaint:    24 hour events/subjective:    seen on hd   has had HA   denies worsening sob     PAST HISTORY  --------------------------------------------------------------------------------  No significant changes to PMH, PSH, FHx, SHx, unless otherwise noted    ALLERGIES & MEDICATIONS  --------------------------------------------------------------------------------  Allergies    No Known Allergies    Intolerances    epinephrine (Other)    Standing Inpatient Medications  amLODIPine   Tablet 2.5 milliGRAM(s) Oral daily  lactobacillus acidophilus 1 Tablet(s) Oral daily  metoprolol tartrate 50 milliGRAM(s) Oral daily  metoprolol tartrate 25 milliGRAM(s) Oral at bedtime    PRN Inpatient Medications  acetaminophen     Tablet .. 650 milliGRAM(s) Oral every 6 hours PRN  aluminum hydroxide/magnesium hydroxide/simethicone Suspension 30 milliLiter(s) Oral every 4 hours PRN  melatonin 3 milliGRAM(s) Oral at bedtime PRN  ondansetron Injectable 4 milliGRAM(s) IV Push every 8 hours PRN      REVIEW OF SYSTEMS  --------------------------------------------------------------------------------    Gen: denies  fevers/chills,  CVS: denies chest pain/palpitations  Resp: denies SOB/Cough  GI: Denies N/V/Abd pain  : Denies dysuria    VITALS/PHYSICAL EXAM  --------------------------------------------------------------------------------  T(C): 36.8 (04-22-24 @ 19:54), Max: 36.8 (04-22-24 @ 19:54)  HR: 62 (04-22-24 @ 19:54) (59 - 68)  BP: 167/70 (04-22-24 @ 19:54) (130/54 - 171/69)  RR: 18 (04-22-24 @ 19:54) (17 - 19)  SpO2: 95% (04-22-24 @ 19:54) (95% - 98%)  Wt(kg): --  Height (cm): 157.5 (04-20-24 @ 22:09)  Weight (kg): 50.8 (04-20-24 @ 22:09)  BMI (kg/m2): 20.5 (04-20-24 @ 22:09)  BSA (m2): 1.49 (04-20-24 @ 22:09)      04-22-24 @ 07:01  -  04-22-24 @ 21:57  --------------------------------------------------------  IN: 480 mL / OUT: 0 mL / NET: 480 mL      Physical Exam:  	    Gen: Non toxic comfortable appearing   	no jvd   	Pulm: decrease bs  no rales or ronchi or wheezing  	CV: RRR, S1S2; no rub  	Abd: +BS, soft, nontender/nondistended  	: No suprapubic tenderness  	UE: Warm, no cyanosis  no clubbing,  no edema; no asterixis  	LE: Warm, no cyanosis  no clubbing,  no  edema  	Neuro: alert and oriented. speech coherent   	Skin: Warm, no decrease skin turgor   	Vascular access:  + avf + bruit and thrill     LABS/STUDIES  --------------------------------------------------------------------------------              10.6   5.48  >-----------<  112      [04-22-24 @ 09:52]              32.7     138  |  102  |  72  ----------------------------<  132      [04-22-24 @ 09:52]  4.5   |  18  |  4.07        Ca     9.3     [04-22-24 @ 09:52]      Mg     2.2     [04-22-24 @ 09:52]      Phos  4.4     [04-22-24 @ 09:52]    TPro  6.6  /  Alb  3.8  /  TBili  0.7  /  DBili  x   /  AST  41  /  ALT  45  /  AlkPhos  115  [04-21-24 @ 15:52]    PT/INR: PT 11.1 , INR 1.06       [04-20-24 @ 23:04]  PTT: 27.4       [04-20-24 @ 23:04]      Creatinine Trend:  SCr 4.07 [04-22 @ 09:52]  SCr 4.00 [04-21 @ 15:52]  SCr 3.58 [04-20 @ 23:04]      PTH -- (Ca 9.7)      [06-03-23 @ 07:00]   276        
[facility]   [phone]  RENAL FOLLOW UP NOTE  --------------------------------------------------------------------------------  Chief Complaint:    24 hour events/subjective:    seen earlier   states has htn and HA during each hd     PAST HISTORY  --------------------------------------------------------------------------------  No significant changes to PMH, PSH, FHx, SHx, unless otherwise noted    ALLERGIES & MEDICATIONS  --------------------------------------------------------------------------------  Allergies    No Known Allergies    Intolerances    epinephrine (Other)    Standing Inpatient Medications  amLODIPine   Tablet 2.5 milliGRAM(s) Oral daily  lactobacillus acidophilus 1 Tablet(s) Oral daily  metoprolol tartrate 50 milliGRAM(s) Oral daily  metoprolol tartrate 25 milliGRAM(s) Oral at bedtime    PRN Inpatient Medications  acetaminophen     Tablet .. 650 milliGRAM(s) Oral every 6 hours PRN  aluminum hydroxide/magnesium hydroxide/simethicone Suspension 30 milliLiter(s) Oral every 4 hours PRN  melatonin 3 milliGRAM(s) Oral at bedtime PRN  ondansetron Injectable 4 milliGRAM(s) IV Push every 8 hours PRN      REVIEW OF SYSTEMS  --------------------------------------------------------------------------------    Gen: denies  fevers/chills,  CVS: denies chest pain/palpitations  Resp: denies SOB/Cough  GI: Denies N/V/Abd pain  : Denies dysuria    VITALS/PHYSICAL EXAM  --------------------------------------------------------------------------------  T(C): 36.4 (04-23-24 @ 15:39), Max: 36.7 (04-23-24 @ 04:49)  HR: 61 (04-23-24 @ 15:39) (54 - 62)  BP: 156/64 (04-23-24 @ 15:39) (119/75 - 163/77)  RR: 18 (04-23-24 @ 15:39) (18 - 18)  SpO2: 97% (04-23-24 @ 15:39) (95% - 98%)  Wt(kg): --        04-22-24 @ 07:01  -  04-23-24 @ 07:00  --------------------------------------------------------  IN: 480 mL / OUT: 0 mL / NET: 480 mL    04-23-24 @ 07:01  -  04-23-24 @ 20:34  --------------------------------------------------------  IN: 940 mL / OUT: 1700 mL / NET: -760 mL      Physical Exam:  	      Gen: Non toxic comfortable appearing   	no jvd   	Pulm: decrease bs  no rales or ronchi or wheezing  	CV: RRR, S1S2; no rub  	Abd: +BS, soft, nontender/nondistended  	: No suprapubic tenderness  	UE: Warm, no cyanosis  no clubbing,  no edema  	LE: Warm, no cyanosis  no clubbing,  no  edema  	Neuro: alert and oriented. speech coherent   	Skin: Warm, no decrease skin turgor   	Vascular access:  + avf + bruit and thrill     LABS/STUDIES  --------------------------------------------------------------------------------              10.6   5.48  >-----------<  112      [04-22-24 @ 09:52]              32.7     138  |  102  |  72  ----------------------------<  132      [04-22-24 @ 09:52]  4.5   |  18  |  4.07        Ca     9.3     [04-22-24 @ 09:52]      Mg     2.2     [04-22-24 @ 09:52]      Phos  4.4     [04-22-24 @ 09:52]            Creatinine Trend:  SCr 4.07 [04-22 @ 09:52]  SCr 4.00 [04-21 @ 15:52]  SCr 3.58 [04-20 @ 23:04]      PTH -- (Ca 9.7)      [06-03-23 @ 07:00]   276

## 2024-04-23 NOTE — PROGRESS NOTE ADULT - ASSESSMENT
89 y/o F with PMH of ESRD on HD T/Th/Sat, last session on Thursday, HTN, s/p AVR, not on A/C, presents to the ED complaining of bilateral leg edema, L>R, with LLE weeping. She reports the swelling started last week, but the weeping started today. She missed dialysis because it is her birthday.Yesterday afternoon, she began experiencing moderately severe right scapular pain for unclear reasons which is positional and not pleuritic and weeping from a shallow abrasion of the medial portion of the left calf.  Because of the persistent weeping, she came to the ER.  Patient reports chronic SOB which she feels is at baseline.  She has a history of bronchiectasis but only used prn albuterol for that. A POCUS Echo in the ER demonstrated grossly normal LV function.  Venous Doppler was negative for DVT. Patient was admitted to [facility] last week with a prolonged migraine which responded to morphine.     last hd 3 days ago PTA       1- ESRD  2- CHF   3- HTN       hd in am   hd consent obtained witnessed and placed in chart  increase fluid removal with hd in am   metoprolol 50/25 mg for bp control   to have phos in am     
89 y/o F with PMH of ESRD on HD T/Th/Sat, last session on Thursday, HTN, s/p AVR, not on A/C, presents to the ED complaining of bilateral leg edema, L>R, with LLE weeping. She reports the swelling started last week, but the weeping started today. She missed dialysis because it is her birthday.Yesterday afternoon, she began experiencing moderately severe right scapular pain for unclear reasons which is positional and not pleuritic and weeping from a shallow abrasion of the medial portion of the left calf.  Because of the persistent weeping, she came to the ER.  Patient reports chronic SOB which she feels is at baseline.  She has a history of bronchiectasis but only used prn albuterol for that. A POCUS Echo in the ER demonstrated grossly normal LV function.  Venous Doppler was negative for DVT. Patient was admitted to [facility] last week with a prolonged migraine which responded to morphine.     last hd 3 days ago PTA       1- ESRD  2- CHF   3- HTN       hd f160 2.  hr5 bfr 400 dfr 600 1 liter fluid removal   add altace 2.5mg intra dialysis if bp remains elevated during hd   metoprolol 50/25 mg for bp control   d/w cards as well 
89 y/o F with PMH of ESRD on HD T/Th/Sat, last session on Thursday, HTN, s/p AVR, not on A/C, presents to the ED complaining of bilateral leg edema, L>R, with LLE weeping. She reports the swelling started last week, but the weeping started today. She missed dialysis because it is her birthday.Yesterday afternoon, she began experiencing moderately severe right scapular pain for unclear reasons which is positional and not pleuritic and weeping from a shallow abrasion of the medial portion of the left calf.  Because of the persistent weeping, she came to the ER.  Patient reports chronic SOB which she feels is at baseline.  She has a history of bronchiectasis but only used prn albuterol for that. A POCUS Echo in the ER demonstrated grossly normal LV function.  Venous Doppler was negative for DVT. Patient was admitted to [facility] last week with a prolonged migraine which responded to morphine.     last hd 3 days ago PTA       1- ESRD  2- CHF   3- HTN       hd f160 2. 75 hr bfr 400 dfr 600 1 liter fluid removal if pt allows   see hd flow sheet   metoprolol 50/25 mg for bp control 
Impression:  Edema apparently mild for past 2 weeks and weeping from abrasion on left lower leg.  Patient does not get fluid taken off at dialysis as she stillurinates adequately.  She did miss one session.  ? if now renal function is such that needs some fluid taken off.  She has lost weight but states not eating well.    C/O migraine.    Hypertension.    Plan:  Dialysis today.  D/C later today if stable.              Amlodipien restarted.             Tylenol and caffeine for headache.
Impression:  Stable S/P dialysis yesterday with BP elevation somewhat improved now.                      Likely volume overload worse after missing one dialysis session contributing to edema on admission.                      Migraine resolved with history of chronic migraines.    Plan:  D/C home after dialysis today.,

## 2024-04-23 NOTE — DISCHARGE NOTE NURSING/CASE MANAGEMENT/SOCIAL WORK - NSDCFUADDAPPT_GEN_ALL_CORE_FT
APPTS ARE READY TO BE MADE: [ x] YES    Best Family or Patient Contact (if needed):    Additional Information about above appointments (if needed):    1: Follow up with your Nephrologist Dr. Pinon 1 week after discharge  2: You are scheduled for a follow up with your PCP Dr. Perez in July. You can call to make an appointment sooner if you wish to be seen.  3:     Other comments or requests:       Met with patient face to face and provided the patient with provider referral information, however patient prefers to schedule the appointments on their own.

## 2024-04-23 NOTE — DISCHARGE NOTE NURSING/CASE MANAGEMENT/SOCIAL WORK - NSSCTYPOFSERV_GEN_ALL_CORE
RN to assess for skilled needs & initiate home physical therapy. Nurse will call to schedule visit.

## 2024-04-23 NOTE — DISCHARGE NOTE NURSING/CASE MANAGEMENT/SOCIAL WORK - PATIENT PORTAL LINK FT
You can access the FollowMyHealth Patient Portal offered by [facility] by registering at the following website: [email or web address]. By joining [email or web address]’s FollowMyHealth portal, you will also be able to view your health information using other applications (apps) compatible with our system.

## 2024-05-16 ENCOUNTER — APPOINTMENT (OUTPATIENT)
Dept: PAIN MANAGEMENT | Facility: CLINIC | Age: 89
End: 2024-05-16
Payer: MEDICARE

## 2024-05-16 DIAGNOSIS — G44.81 HYPNIC HEADACHE: ICD-10-CM

## 2024-05-16 DIAGNOSIS — I67.1 CEREBRAL ANEURYSM, NONRUPTURED: ICD-10-CM

## 2024-05-16 DIAGNOSIS — R26.89 OTHER ABNORMALITIES OF GAIT AND MOBILITY: ICD-10-CM

## 2024-05-16 DIAGNOSIS — I63.9 CEREBRAL INFARCTION, UNSPECIFIED: ICD-10-CM

## 2024-05-16 PROBLEM — I48.20 CHRONIC ATRIAL FIBRILLATION, UNSPECIFIED: Chronic | Status: ACTIVE | Noted: 2024-04-21

## 2024-05-16 PROBLEM — N18.6 END STAGE RENAL DISEASE: Chronic | Status: ACTIVE | Noted: 2024-04-21

## 2024-05-16 PROCEDURE — G2211 COMPLEX E/M VISIT ADD ON: CPT

## 2024-05-16 PROCEDURE — 99214 OFFICE O/P EST MOD 30 MIN: CPT

## 2024-05-16 RX ORDER — ZOLMITRIPTAN 5 MG/1
5 TABLET, FILM COATED ORAL
Qty: 18 | Refills: 3 | Status: ACTIVE | COMMUNITY
Start: 2024-05-16 | End: 1900-01-01

## 2024-05-27 PROBLEM — R26.89 BALANCE PROBLEM: Status: ACTIVE | Noted: 2019-11-26

## 2024-05-27 NOTE — HISTORY OF PRESENT ILLNESS
[FreeTextEntry1] : Pt notes that she has been in dialysis now and sees that during tiw treatment will get  associated headache. Pain is always right sided temporal and supraorbital. + nausea and some visual change. Has seen Dr. Libman and cleared for use of triptan. Did have chronic small lacunar infarct and we discussed risk/ benenfit of use of triptan. Does have some "queasiness" which she sees associated with dialysis. Has gone to hospital ED at [place] for treatment with tylenol/ reglan/ benadryl without benefit.  Gave steroid without benefit.  Eventually morphine effective. Asking re: use of triptan so that she doesn't have to go to ED [Headache] : headache [Aura] : aura [Nausea] : nausea [Neck Pain] : neck pain [Scalp Tenderness] : scalp tenderness [> 4 hours] : > 4 hours [Worsened] : The patient reports ~his/her~ symptoms since the last visit have worsened

## 2024-05-27 NOTE — ASSESSMENT
[FreeTextEntry1] : Trial of Ajovy Continue rare prn triptan with restriction. Discussed maximizing non pharmacologic care.

## 2024-05-27 NOTE — REVIEW OF SYSTEMS
[Fever] : no fever [Feeling Poorly] : feeling poorly [Feeling Tired] : feeling tired [Eye Pain] : eye pain [Eyesight Problems] : eyesight problems [Nasal Discharge] : no nasal discharge [Chest Pain] : no chest pain [Cough] : no cough [Arthralgias] : arthralgias [Skin Lesions] : no skin lesions [Itching] : no itching [As Noted in HPI] : as noted in HPI [Confused] : no confusion [Convulsions] : no convulsions [Fainting] : no fainting [Muscle Weakness] : no muscle weakness [Swollen Glands] : no swollen glands

## 2024-05-27 NOTE — PHYSICAL EXAM
[General Appearance - Alert] : alert [General Appearance - Well Nourished] : well nourished [General Appearance - Well Developed] : well developed [General Appearance - Well-Appearing] : healthy appearing [Oriented To Time, Place, And Person] : oriented to person, place, and time [Impaired Insight] : insight and judgment were intact [Affect] : the affect was normal [Memory Recent] : recent memory was not impaired [Memory Remote] : remote memory was not impaired [Person] : oriented to person [Place] : oriented to place [Time] : oriented to time [Short Term Intact] : short term memory intact [Remote Intact] : remote memory intact [Registration Intact] : recent registration memory intact [Visual Intact] : visual attention was ~T not ~L decreased [Fluency] : fluency intact [Comprehension] : comprehension intact [Current Events] : adequate knowledge of current events [Past History] : adequate knowledge of personal past history [Vocabulary] : adequate range of vocabulary [Cranial Nerves Oculomotor (III)] : extraocular motion intact [Cranial Nerves Facial (VII)] : face symmetrical [Cranial Nerves Vestibulocochlear (VIII)] : hearing was intact bilaterally [Cranial Nerves Accessory (XI - Cranial And Spinal)] : head turning and shoulder shrug symmetric [Cranial Nerves Hypoglossal (XII)] : there was no tongue deviation with protrusion [Motor Strength] : muscle strength was normal in all four extremities [No Muscle Atrophy] : normal bulk in all four extremities [Sensation Tactile Decrease] : light touch was intact [Tremor] : a tremor present [Sclera] : the sclera and conjunctiva were normal [No SHERRY] : no internuclear ophthalmoplegia [Neck Appearance] : the appearance of the neck was normal [Neck Cervical Mass (___cm)] : no neck mass was observed [Exaggerated Use Of Accessory Muscles For Inspiration] : no accessory muscle use [Nail Clubbing] : no clubbing  or cyanosis of the fingernails [Involuntary Movements] : no involuntary movements were seen [Skin Color & Pigmentation] : normal skin color and pigmentation [] : no rash [Motor Strength Upper Extremities Bilaterally] : strength was normal in both upper extremities [Dysdiadochokinesia Bilaterally] : not present [FreeTextEntry8] : mild action bilateral

## 2024-06-05 ENCOUNTER — APPOINTMENT (OUTPATIENT)
Dept: NEUROLOGY | Facility: CLINIC | Age: 89
End: 2024-06-05

## 2024-06-07 RX ORDER — ATOGEPANT 10 MG/1
10 TABLET ORAL
Qty: 30 | Refills: 5 | Status: COMPLETED | COMMUNITY
Start: 2024-05-22 | End: 2024-06-07

## 2024-06-07 RX ORDER — AMLODIPINE BESYLATE 2.5 MG/1
2.5 TABLET ORAL
Refills: 0 | Status: COMPLETED | COMMUNITY
End: 2024-06-07

## 2024-07-29 ENCOUNTER — APPOINTMENT (OUTPATIENT)
Dept: NEUROLOGY | Facility: CLINIC | Age: 89
End: 2024-07-29
Payer: MEDICARE

## 2024-07-29 VITALS — HEIGHT: 62 IN | WEIGHT: 103 LBS | BODY MASS INDEX: 18.95 KG/M2

## 2024-07-29 VITALS — SYSTOLIC BLOOD PRESSURE: 153 MMHG | DIASTOLIC BLOOD PRESSURE: 68 MMHG | HEART RATE: 54 BPM

## 2024-07-29 DIAGNOSIS — I63.9 CEREBRAL INFARCTION, UNSPECIFIED: ICD-10-CM

## 2024-07-29 DIAGNOSIS — I60.9 NONTRAUMATIC SUBARACHNOID HEMORRHAGE, UNSPECIFIED: ICD-10-CM

## 2024-07-29 DIAGNOSIS — I67.1 CEREBRAL ANEURYSM, NONRUPTURED: ICD-10-CM

## 2024-07-29 PROCEDURE — G2211 COMPLEX E/M VISIT ADD ON: CPT

## 2024-07-29 PROCEDURE — 93892 TCD EMBOLI DETECT W/O INJ: CPT

## 2024-07-29 PROCEDURE — 93880 EXTRACRANIAL BILAT STUDY: CPT

## 2024-07-29 PROCEDURE — 93886 INTRACRANIAL COMPLETE STUDY: CPT

## 2024-07-29 PROCEDURE — 99214 OFFICE O/P EST MOD 30 MIN: CPT

## 2024-07-29 NOTE — HISTORY OF PRESENT ILLNESS
[FreeTextEntry1] : 89-year-old ambidextrous lady  Summary from  5/3/18 She Came for an opinion regarding a right cavernous ICA aneurysm.  Summary from Dr. Humza Temple's note of 3/26/18-with modification In 8/17, she underwent aortic valve replacement with bioprosthetic aortic valve. In 1/9/18, she was admitted to the hospital with urosepsis leading to E Coli bacteremia. She underwent work up including  for possible infective endocarditis. She was treated with ABx for total of 2 weeks. During the work up, she had CT brain performed due to unknown reasons. She denies any significant HAs at that time but reported to be confused/disoriented at that time due to infection/sepsis. She denies any significant head injury before her SAH but reports to have head injury about 2 weeks ago without any LOC. She reports to be taking warfarin for 3 months after the AVR surgery. Of note, she also reports to have history of migraines on Zomig.  CT brain (1/10/18 12:12 AM): Subarachnoid hemorrhage noted in left high frontal and left fronto-parietal region  MRA head (1/13/18): Small 2-3 mm aneurysm noted involving right cavernous left supraclinoid ICA. MRI brain (1/18): No evidence of acute infarct, previously noted subarachnoid hemorrhage showed complete resolution, an isolated micro-hemorrhage noted in the right high frontal region involving the cortex/juxta-cortical region  5/3/18. She came to the office today. She reports no new focal neurologic symptoms. She was told that she does not need to take aspirin from the standpoint of her bovine AVR  Repeat MRI brain (4/18/18) to my eye  showed a small focus of susceptibility in the high right parietal cortex, consistent with either a chronic microhemorrhage, mineralization or a small cavernoma. Repeat MRA neck and head (4/18/18) showed a right cavernous carotid aneurysm, measured at 3.4 mm.  10/24/19. She Came to the Office Today. She has chronic, mild postural instability. No new focal neurologic symptoms.  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 Repeat MRA neck and head (4/30/19) showed stability of the right cavernous carotid aneurysm, measured at 3.6 x 2.1 x 2.9 mm.  5/14/2020. This service was provided using telehealth (video). The patient consented to this service. Location of patient: Home Location of provider: Office Names of all persons participating in the telehealth service and their role in the encounter: Patient  She reports anxiety related to social isolation due to COVID 19 but she is coping.  She frequently awakens with brief right-sided headaches, diagnosed by Dr. Alegria as possible "hypnic headache".  No new focal neurologic symptoms.  Carotid Doppler (5/21/2020) showed moderate heterogeneous plaque on the right with 40-60% stenosis by velocity criteria, 50% diameter reduction.  The right ICA was markedly tortuous.  The left ICA was tortuous but there was no plaque.  The extracranial vertebral arteries showed anterograde flow with a normal resistance pattern.  Heart rate was noted to be irregular.  Incidentally noted were bilateral complex thyroid nodules.  Transcranial Doppler (5/21/2020) of the [place] was normal with no sign of stenosis.  Heart rate was noted to be irregular.  Repeat MRA brain (6/24/2020) showed a right cavernous carotid aneurysm, measured at 2.7 x 2.2 mm, not significantly changed compared to the prior MRA.  5/13/2021. She came to the office today. She reports chronic lightheadedness, along with episodic "fogginess" and fatigue, possibly related to "reactive hypoglycemia". Otherwise no new focal neurologic symptoms.  Repeat carotid Doppler (5/3/2021)  showed moderate heterogeneous plaque on the right with 40-60% stenosis by velocity criteria, 50% diameter reduction.  The right ICA was markedly tortuous.  The left ICA was tortuous but there was no plaque. Heart rate was noted to be irregular.  Incidentally noted were bilateral complex thyroid nodules.  Impression.  No significant change compared to 5/2020.  Repeat transcranial Doppler (5/3/2021) was normal.  Heart rate was noted to be irregular.  Impression.  No significant change compared to 5/20.  6/6/22.  She came to the office today.  She has been diagnosed with CKD 4.  She feels generally weak, unsteady and "queasy" but without clear focal symptoms.  She has developed tremors which seem worse in the morning and improve as the day goes along.  This tremor interferes with putting on make-up but she otherwise remains functional and independent in ADLs.  Repeat MRA brain (5/21/2022-compared to 6/24/2020) showed a stable right cavernous carotid aneurysm, measured at 2.7 x 2.4 mm.  12/29/22 She came to the office today. She has been dealing with multiple medical concerns, CKD, shortness of breath and low back pain. She denies any focal neurologic symptoms.  Repeat carotid Doppler (10/6/2022) showed moderate heterogeneous plaque on the right with 40-60% stenosis by velocity criteria, 50% diameter reduction; on the left there was mild heterogeneous plaque with less than 40% stenosis. Incidentally noted were bilateral thyroid nodules. Impression. Very mild increase in left ICA stenosis compared to 5/21.  Repeat transcranial Doppler (10/6/2022) was normal.  1/26/2024.  She came to the office today.  In 6/23, she developed an episode of right arm shaking with difficulty controlling her right arm, possibly with associated weakness.  There was associated chest pain.  The right arm symptoms lasted less than 24 hours.  She was admitted to [facility].  Cardiac workup by her report was negative.  In the past, she had headaches, either migraine or hypnic headaches, exclusively left-sided.  Since about 5/23, the headaches have recurred, either awakening her from sleep or towards the end of dialysis, and now exclusively right-sided.  Repeat CT head (6/1/2023) showed small subdural hygromas and was otherwise unremarkable.  3/14/24 She presents to the office today. She has been experiencing recurrent migraines following her dialysis sessions and is requesting neurovascular clearance to use a triptan. She denies any new focal neurologic symptoms.  MRI brain requested by Dr. Gildardo Alegria and Miranda Barber, JORDON for worsening headaches (3/9/2024) to my eye showed a chronic, punctate lacunar infarct in the left cerebellar hemisphere; mild-moderate periventricular and subcortical white matter hyperintensities of presumed vascular origin.  7/29/24 She presents to the office today. She denies any new focal neurologic symptoms.  Repeat carotid Doppler (7/29/24) showed moderate heterogeneous plaque on the right with 40-60% stenosis by velocity criteria, 50% diameter reduction; on the left there was mild heterogeneous plaque with less than 40% stenosis. Incidentally noted were bilateral thyroid nodules. Impression. Very mild increase in left ICA stenosis compared to 10/22. TCD (7/29/24): Normal. Heart rate was noted to be irregular.  PCP Dr. Tucker Perez, [facility]

## 2024-07-29 NOTE — REVIEW OF SYSTEMS
[Feeling Tired] : feeling tired [Recent Weight Loss (___ Lbs)] : recent [unfilled] ~Ulb weight loss [As Noted in HPI] : as noted in HPI [Shortness Of Breath] : shortness of breath [SOB on Exertion] : shortness of breath during exertion [Negative] : Endocrine [FreeTextEntry2] : Probable snoring [FreeTextEntry9] : Chronic low back pain, recently exacerbated

## 2024-07-29 NOTE — CONSULT LETTER
[Dear  ___] : Dear  [unfilled], [Consult Letter:] : I had the pleasure of evaluating your patient, [unfilled]. [Please see my note below.] : Please see my note below. [Consult Closing:] : Thank you very much for allowing me to participate in the care of this patient.  If you have any questions, please do not hesitate to contact me. [Sincerely,] : Sincerely, [FreeTextEntry2] : Tucker Perez MD [FreeTextEntry3] : Dari Hernandez, FNP-BC

## 2024-07-29 NOTE — PHYSICAL EXAM
[General Appearance - Alert] : alert [General Appearance - In No Acute Distress] : in no acute distress [Oriented To Time, Place, And Person] : oriented to person, place, and time [Impaired Insight] : insight and judgment were intact [Affect] : the affect was normal [Sclera] : the sclera and conjunctiva were normal [PERRL With Normal Accommodation] : pupils were equal in size, round, reactive to light, with normal accommodation [Extraocular Movements] : extraocular movements were intact [Outer Ear] : the ears and nose were normal in appearance [Oropharynx] : the oropharynx was normal [Neck Appearance] : the appearance of the neck was normal [Neck Cervical Mass (___cm)] : no neck mass was observed [Jugular Venous Distention Increased] : there was no jugular-venous distention [Thyroid Diffuse Enlargement] : the thyroid was not enlarged [Thyroid Nodule] : there were no palpable thyroid nodules [Auscultation Breath Sounds / Voice Sounds] : lungs were clear to auscultation bilaterally [Heart Rate And Rhythm] : heart rate was normal and rhythm regular [Heart Sounds Gallop] : no gallops [Heart Sounds] : normal S1 and S2 [Heart Sounds Pericardial Friction Rub] : no pericardial rub [Edema] : there was no peripheral edema [Veins - Varicosity Changes] : there were no varicosital changes [No CVA Tenderness] : no ~M costovertebral angle tenderness [No Spinal Tenderness] : no spinal tenderness [Nail Clubbing] : no clubbing  or cyanosis of the fingernails [Musculoskeletal - Swelling] : no joint swelling seen [Motor Tone] : muscle strength and tone were normal [Skin Color & Pigmentation] : normal skin color and pigmentation [Skin Turgor] : normal skin turgor [] : no rash [FreeTextEntry1] : soft left carotid bruit

## 2024-07-29 NOTE — DISCUSSION/SUMMARY
[FreeTextEntry1] : Summary from Dr. Temple of note of 3/26/18.  In 8/17, she underwent aortic valve replacement with bioprosthetic valve. In 1/18, she was admitted to OSH with urosepsis (E Coli). She reports to be confused/disoriented and febrile at the time of admission to OSH CT brain on 1/10/18  showed evidence of acute subarachnoid hemorrhage in left high frontal and left fronto-parietal region. MRI brain did not show any evidence of acute infarct and previously noted subarachnoid hemorrhage showed complete resolution; although incidentally showed an isolated micro-hemorrhage in the right high frontal region involving the cortex/juxta-cortical region. MRA head on 1/13/18 showed a 3 mm aneurysm of the right cavernous/supraclinoid ICA.  Transesophageal echo did not show any evidence of vegetations.   Impression: 1. Nontraumatic left hemispheric (left high frontal and left frontal parietal region) subarachnoid hemorrhage of undetermined etiology; possible distal aneurysm, reversible cerebral vasoconstriction syndrome etc. 2. 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 She has mild postural instability, probably multifactorial, and otherwise she is neurologically intact.  I suspect that her previous left-sided convexity subarachnoid hemorrhage was due to amyloid angiopathy. She has an incidental, unruptured small right cavernous carotid aneurysm. At that size and in that location, this aneurysm is of minimal risk to her. There is no role for treatment. She should continue her usual activities without restrictions. As a precaution, she could have repeat MRA in one year.  10/24/19. Overall she is neurologically stable and doing well. She has chronic, mild postural instability, probably multifactorial.  I gave her a prescription for physiotherapy.  She should have repeat MRA brain in approximately 6 months to monitor her right cavernous carotid aneurysm for stability.  5/14/2020.  Overall she is neurologically stable and doing well.  She awakens with headaches diagnosed as possible "hypnic headache", and almost definitely unrelated to her small intracranial aneurysm.  Carotid Doppler (5/21/2020) showed asymptomatic right carotid stenosis of about 50%, of no clinical significance. I defer to Dr. Perez's judgment as to whether the irregular heart rate, as well as thyroid nodules, incidentally detected on Doppler, warrant further investigation.  Repeat MRA brain (6/24/2020) showed no significant change in the small right cavernous carotid aneurysm.  Given her age and the stability of the aneurysm, I do not believe that she will benefit from further routine monitoring of this aneurysm.  5/13/2021. Overall she is neurologically stable and doing well.  Dopplers show stable, moderate, asymptomatic right carotid stenosis of about 50%, for which there is no role for revascularization.  She has daytime fatigue and snoring, raising the possibility of sleep apnea, and I have requested a home sleep study  I advised her to follow-up with you to ensure optimal blood pressure control.  6/6/22.  Overall she is neurologically stable.  She has a mild-moderate postural and action tremor which interferes slightly with her activities such as putting on make-up but otherwise is nondisabling.  It is unclear to me whether this tremor is related to her chronic kidney disease.  If the tremor becomes disabling, then there may be a role for pharmacological treatment.  Repeat MRA brain (5/21/2022) showed a stable right cavernous carotid aneurysm, 2.7 mm in greatest diameter. As previously noted, given her age, follow-up imaging is not mandatory, but as a precaution, she can have repeat MRA brain in about 2 years, i.e. about 5/24.   She has significant daytime fatigue, again raising the possibility of sleep apnea and I have again referred her for a home sleep study.  She has significant low back pain and I have referred her for a physiatry evaluation.  12/29/22 - Overall she is neurologically stable, with renal and possibly pulmonary problems taking priority at present - Dopplers on 10/6/22 revealed stable moderate right ICA stenosis, 40-60%. She developed a small increase in left ICA stenosis; still mild, <40%. - She has significant daytime fatigue, again raising the possibility of sleep apnea and I have again requested a home sleep study.  1/26/2024.  Overall she is neurologically stable.  In 6/23 she had an episode of right arm shaking and/or possible weakness versus ataxia, associated with chest pain.  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  The exact diagnosis remains unclear to me.  After years of remission from either migraine or hypnic left-sided headaches, since 5/23, her headaches have recurred, either occurring during sleep at night, or towards the end of dialysis.  They are identical to her previous headaches, but are now right-sided.  I highly doubt that this reflects serious intracranial pathology such as hemorrhage or mass lesion.  I suggested that she reconsult Dr. Gildardo Alegria for further management.  3/14/24 - Overall she is neurologically stable. - MRI brain (3/9/2024) was unremarkable, with no evidence of recent infarction or hemorrhage, perhaps suggesting that her worsening headaches represent her previous migraine or hypnic headaches. -She stated that triptans are the only treatment which have helped her headaches in the past.  In light of the fact that she never had a clinical ischemic stroke, she only has a tiny chronic cerebellar lacunar infarct (clinically silent), I believe the benefits of triptan therapy would outweigh the associated risks.  - Given her age and the stability of the right cavernous carotid aneurysm; there is no role for repeating an MRA.   7/29/24 - Overall she is neurologically stable and doing well. - Dopplers done today were stable from previous exam with approximately 50% stenosis of the right ICA and <40% stenosis of the left ICA. Her heart rate on Doppler was again incidentally detected to be irregular; I will defer to you regarding whether this finding warrants further investigation. - She has significant daytime fatigue, again raising the possibility of sleep apnea. She refused a sleep study at this time.  She can follow up in about 6 months with repeat Dopplers.  I hope that she remains free of serious trouble.

## 2024-08-21 ENCOUNTER — APPOINTMENT (OUTPATIENT)
Dept: WOUND CARE | Facility: HOSPITAL | Age: 89
End: 2024-08-21
Payer: MEDICARE

## 2024-08-21 DIAGNOSIS — Z87.828 PERSONAL HISTORY OF OTHER (HEALED) PHYSICAL INJURY AND TRAUMA: ICD-10-CM

## 2024-08-21 DIAGNOSIS — M79.18 MYALGIA, OTHER SITE: ICD-10-CM

## 2024-08-21 PROCEDURE — 99205 OFFICE O/P NEW HI 60 MIN: CPT

## 2024-08-21 NOTE — HISTORY OF PRESENT ILLNESS
[FreeTextEntry1] : 8-21-24 Ms. Aragon is a 89 yo F  presents to the office with a wound for 1 year duration.  The wound is located on  the sacrum. She states that she developed the wound after recent multiple hospitalizations. She also sits in her dialysis chair for 3 hours at a time 3 days a week and the chairs are very uncomfortable. She ambulates with a walker and a cane.  The patient has complaints of buttock pain and itching in the area.   The patient has been dressing the wound with antibiotic ointment for the itching  The patient denies fevers or chills.  The patient has localized pain to the wound upon dressing changes.  The patient has no other complaints or associated symptoms.  She is not incontinent of urine or stool although she wears a pad for occasional stress incontinence.

## 2024-08-21 NOTE — PHYSICAL EXAM
[Alert] : alert [Oriented to Person] : oriented to person [Oriented to Place] : oriented to place [Oriented to Time] : oriented to time [Calm] : calm [Please See PDF for [employer]] : Please See PDF for [employer]. [de-identified] : well developed, slightly cachectic in appearance. No acute distress [de-identified] : normocephalic, atraumatic [de-identified] : supple [de-identified] :  symmetrical rise and fall of chest wall [de-identified] : soft, non-tender [de-identified] : Sacrum with dry callus and healed wound

## 2024-08-21 NOTE — ASSESSMENT
[FreeTextEntry1] : 8-21-24 The patient presents with a healed pressure injury to the sacrum. Pt states she continues to have buttock pain despite healed wound.  On exam: Sacrum: callous with dry skin overlying healed wound. No apparent signs of infection.

## 2024-08-21 NOTE — PLAN
[FreeTextEntry1] : 8-21-24: Plan:  No wound care needs at this time Recommend follow up with PCP for work up of buttock pain as this is not related to any noticeable wound Recommend gel matrix cushion to be used when patient is sitting  Follow up PRN

## 2024-08-21 NOTE — REVIEW OF SYSTEMS
[Arthralgias] : arthralgias [As Noted in HPI] : as noted in HPI [Skin Wound] : skin wound [Negative] : Psychiatric

## 2024-10-07 ENCOUNTER — APPOINTMENT (OUTPATIENT)
Dept: PAIN MANAGEMENT | Facility: CLINIC | Age: 89
End: 2024-10-07

## 2025-03-10 ENCOUNTER — APPOINTMENT (OUTPATIENT)
Dept: NEUROLOGY | Facility: CLINIC | Age: 89
End: 2025-03-10

## 2025-03-26 ENCOUNTER — APPOINTMENT (OUTPATIENT)
Dept: NEUROLOGY | Facility: CLINIC | Age: 89
End: 2025-03-26

## 2025-04-09 ENCOUNTER — OUTPATIENT (OUTPATIENT)
Dept: OUTPATIENT SERVICES | Facility: HOSPITAL | Age: 89
LOS: 1 days | End: 2025-04-09
Payer: MEDICARE

## 2025-04-09 ENCOUNTER — APPOINTMENT (OUTPATIENT)
Dept: CT IMAGING | Facility: IMAGING CENTER | Age: 89
End: 2025-04-09
Payer: MEDICARE

## 2025-04-09 DIAGNOSIS — Z00.8 ENCOUNTER FOR OTHER GENERAL EXAMINATION: ICD-10-CM

## 2025-04-09 DIAGNOSIS — Z98.890 OTHER SPECIFIED POSTPROCEDURAL STATES: Chronic | ICD-10-CM

## 2025-04-09 DIAGNOSIS — Z98.89 OTHER SPECIFIED POSTPROCEDURAL STATES: Chronic | ICD-10-CM

## 2025-04-09 DIAGNOSIS — Z98.49 CATARACT EXTRACTION STATUS, UNSPECIFIED EYE: Chronic | ICD-10-CM

## 2025-04-09 PROCEDURE — 70450 CT HEAD/BRAIN W/O DYE: CPT | Mod: 26

## 2025-04-09 PROCEDURE — 70450 CT HEAD/BRAIN W/O DYE: CPT | Mod: MH

## 2025-08-20 ENCOUNTER — APPOINTMENT (OUTPATIENT)
Dept: PAIN MANAGEMENT | Facility: CLINIC | Age: 89
End: 2025-08-20

## 2025-08-20 VITALS
WEIGHT: 100 LBS | HEIGHT: 62 IN | HEART RATE: 61 BPM | SYSTOLIC BLOOD PRESSURE: 156 MMHG | DIASTOLIC BLOOD PRESSURE: 71 MMHG | BODY MASS INDEX: 18.4 KG/M2

## 2025-08-20 PROCEDURE — 99214 OFFICE O/P EST MOD 30 MIN: CPT

## 2025-08-20 RX ORDER — ONDANSETRON 4 MG/1
4 TABLET ORAL DAILY
Qty: 30 | Refills: 2 | Status: ACTIVE | COMMUNITY
Start: 2025-08-20 | End: 1900-01-01

## 2025-08-20 RX ORDER — METOCLOPRAMIDE 10 MG/1
10 TABLET ORAL EVERY 8 HOURS
Qty: 90 | Refills: 0 | Status: ACTIVE | COMMUNITY
Start: 2025-08-20 | End: 1900-01-01

## 2025-09-16 ENCOUNTER — RX RENEWAL (OUTPATIENT)
Age: 89
End: 2025-09-16